# Patient Record
Sex: MALE | Race: OTHER | Employment: UNEMPLOYED | URBAN - METROPOLITAN AREA
[De-identification: names, ages, dates, MRNs, and addresses within clinical notes are randomized per-mention and may not be internally consistent; named-entity substitution may affect disease eponyms.]

---

## 2017-11-13 ENCOUNTER — OFFICE VISIT (OUTPATIENT)
Dept: PULMONOLOGY | Age: 52
End: 2017-11-13

## 2017-11-13 VITALS
SYSTOLIC BLOOD PRESSURE: 128 MMHG | TEMPERATURE: 98.4 F | HEIGHT: 71 IN | DIASTOLIC BLOOD PRESSURE: 74 MMHG | HEART RATE: 69 BPM | BODY MASS INDEX: 40.6 KG/M2 | OXYGEN SATURATION: 95 % | WEIGHT: 290 LBS

## 2017-11-13 DIAGNOSIS — Z99.89 OSA ON CPAP: ICD-10-CM

## 2017-11-13 DIAGNOSIS — G47.33 OSA ON CPAP: ICD-10-CM

## 2017-11-13 DIAGNOSIS — E66.01 MORBID OBESITY (HCC): Primary | ICD-10-CM

## 2017-11-13 PROCEDURE — 1036F TOBACCO NON-USER: CPT | Performed by: INTERNAL MEDICINE

## 2017-11-13 PROCEDURE — 3017F COLORECTAL CA SCREEN DOC REV: CPT | Performed by: INTERNAL MEDICINE

## 2017-11-13 PROCEDURE — G8417 CALC BMI ABV UP PARAM F/U: HCPCS | Performed by: INTERNAL MEDICINE

## 2017-11-13 PROCEDURE — 99214 OFFICE O/P EST MOD 30 MIN: CPT | Performed by: INTERNAL MEDICINE

## 2017-11-13 PROCEDURE — G8484 FLU IMMUNIZE NO ADMIN: HCPCS | Performed by: INTERNAL MEDICINE

## 2017-11-13 PROCEDURE — G8427 DOCREV CUR MEDS BY ELIG CLIN: HCPCS | Performed by: INTERNAL MEDICINE

## 2017-11-13 RX ORDER — LISINOPRIL 10 MG/1
10 TABLET ORAL DAILY
COMMUNITY
End: 2022-06-16

## 2017-11-13 RX ORDER — IBUPROFEN 200 MG
200 TABLET ORAL EVERY 6 HOURS PRN
COMMUNITY
End: 2022-05-27

## 2017-11-13 RX ORDER — RANITIDINE 150 MG/1
150 TABLET ORAL 2 TIMES DAILY
COMMUNITY
End: 2022-05-27

## 2017-11-13 NOTE — PROGRESS NOTES
Subjective:     Dl Grimm is a 45 y.o. male who complains today of:     Chief Complaint   Patient presents with    Sleep Apnea     follow up       HPI  Patient is using CPAP with 11   centimeters of H2O with heated humidity. Patient is using CPAP for about   5-7  hours every night. Patient is using CPAP with  nasal mask  Patient said  sleep is restful with the CPAP use. No snoring with CPAP use  No early morning headache. No complaint of daytime sleepiness or tiredness with CPAP use  Patient denies taking naps. No sleepiness with driving  Patient denies difficulty falling asleep or staying asleep. Allergies:  Quinolones  Past Medical History:   Diagnosis Date    Diabetes ([facility] [address]     GERD (gastroesophageal reflux disease)     High cholesterol     Hypertension     IBS (irritable bowel syndrome)      History reviewed. No pertinent surgical history. Family History   Problem Relation Age of Onset    Asthma Mother     Heart Disease Father      Social History     Social History    Marital status: Single     Spouse name: N/A    Number of children: N/A    Years of education: N/A     Occupational History    Not on file. Social History Main Topics    Smoking status: Never Smoker    Smokeless tobacco: Never Used    Alcohol use No    Drug use: No    Sexual activity: Not on file     Other Topics Concern    Not on file     Social History Narrative    No narrative on file         Review of Systems   Psychiatric/Behavioral: Positive for sleep disturbance. Objective:     Vitals:    11/13/17 0900   BP: 128/74   Site: Right Arm   Position: Sitting   Cuff Size: Large Adult   Pulse: 69   Temp: 98.4 °F (36.9 °C)   TempSrc: Tympanic   SpO2: 95%   Weight: 290 lb (131.5 kg)   Height: 5' 11\" (1.803 m)         Physical Exam   Constitutional: He is oriented to person, place, and time. He appears well-developed and well-nourished. Morbidly obese   HENT:   Head: Normocephalic and atraumatic. Nose: Nose normal.   Mouth/Throat: Oropharynx is clear and moist.   . overhanging soft palate, narrow airway. Eyes: Conjunctivae and EOM are normal. Pupils are equal, round, and reactive to light. Neck: No JVD present. No tracheal deviation present. No thyromegaly present. Cardiovascular: Normal rate and regular rhythm. Exam reveals no gallop and no friction rub. No murmur heard. Pulmonary/Chest: Effort normal and breath sounds normal. No respiratory distress. He has no wheezes. He has no rales. He exhibits no tenderness. Abdominal: He exhibits no distension. Musculoskeletal: Normal range of motion. He exhibits edema. Lymphadenopathy:     He has no cervical adenopathy. Neurological: He is alert and oriented to person, place, and time. No cranial nerve deficit. Skin: Skin is warm and dry. No rash noted. Psychiatric: He has a normal mood and affect. His behavior is normal.       Current Outpatient Prescriptions   Medication Sig Dispense Refill    CPAP Machine MISC 11 cm by Does not apply route      ibuprofen (ADVIL;MOTRIN) 200 MG tablet Take 200 mg by mouth every 6 hours as needed for Pain      lisinopril (PRINIVIL;ZESTRIL) 10 MG tablet Take 10 mg by mouth daily      metFORMIN (GLUCOPHAGE) 500 MG tablet Take 500 mg by mouth 2 times daily (with meals)      ranitidine (ZANTAC 150 MAXIMUM STRENGTH) 150 MG tablet Take 150 mg by mouth 2 times daily       No current facility-administered medications for this visit. Assessment/Plan:     1. ROSA on CPAP  Patient is using CPAP with 11   centimeters of H2O with heated humidity. Patient is using CPAP for about   5-7  hours every night. Patient is using CPAP with  nasal mask . Patient said  sleep is restful with the CPAP use. Counseling: CPAP/BiPAP uses, patient advised to use CPAP at least 5-6 hours every night.     Driving: patient denies extreme caution when driving or operating machinery if there is a feeling of drowsiness, especially while driving it is preferable to stop driving and take a brief nap. Sleep hygiene: He avoid supine sleep, sleep on her sides. Avoid  sleep deprivation. Explained sleep hygiene. Advice to avoid Alcohol and sedative    Time spend over 25 min. Face to face. with greater than 50 % time with counseling regarding CPAP therapy. 2. Morbid obesity ([address]    Patient patient is advised try to lose weight. obesity related risk explained to the patient ,  Current weight:  290 lb (131.5 kg) Lbs. BMI:  Body mass index is 40.45 kg/m². Return in about 6 months (around 5/13/2018) for CPAP f/u.       Sylvia Ashraf MD

## 2021-04-21 ENCOUNTER — APPOINTMENT (OUTPATIENT)
Dept: CT IMAGING | Age: 56
DRG: 134 | End: 2021-04-21
Payer: COMMERCIAL

## 2021-04-21 ENCOUNTER — APPOINTMENT (OUTPATIENT)
Dept: GENERAL RADIOLOGY | Age: 56
DRG: 134 | End: 2021-04-21
Payer: COMMERCIAL

## 2021-04-21 ENCOUNTER — HOSPITAL ENCOUNTER (INPATIENT)
Age: 56
LOS: 1 days | Discharge: HOME | DRG: 134 | End: 2021-04-22
Attending: STUDENT IN AN ORGANIZED HEALTH CARE EDUCATION/TRAINING PROGRAM | Admitting: INTERNAL MEDICINE
Payer: COMMERCIAL

## 2021-04-21 ENCOUNTER — APPOINTMENT (OUTPATIENT)
Dept: ULTRASOUND IMAGING | Age: 56
DRG: 134 | End: 2021-04-21
Payer: COMMERCIAL

## 2021-04-21 ENCOUNTER — APPOINTMENT (OUTPATIENT)
Dept: CARDIAC CATH/INVASIVE PROCEDURES | Age: 56
DRG: 134 | End: 2021-04-21
Payer: COMMERCIAL

## 2021-04-21 DIAGNOSIS — I26.02 ACUTE SADDLE PULMONARY EMBOLISM WITH ACUTE COR PULMONALE (HCC): Primary | ICD-10-CM

## 2021-04-21 DIAGNOSIS — E66.01 MORBID OBESITY WITH BMI OF 40.0-44.9, ADULT (HCC): ICD-10-CM

## 2021-04-21 DIAGNOSIS — R77.8 ELEVATED TROPONIN: ICD-10-CM

## 2021-04-21 LAB
ALBUMIN SERPL-MCNC: 4.3 G/DL (ref 3.5–4.6)
ALP BLD-CCNC: 77 U/L (ref 35–104)
ALT SERPL-CCNC: 35 U/L (ref 0–41)
ANION GAP SERPL CALCULATED.3IONS-SCNC: 16 MEQ/L (ref 9–15)
APTT: 29.6 SEC (ref 24.4–36.8)
AST SERPL-CCNC: 20 U/L (ref 0–40)
BASE EXCESS ARTERIAL: -5 (ref -3–3)
BASE EXCESS VENOUS: -6 (ref -3–3)
BASOPHILS ABSOLUTE: 0.1 K/UL (ref 0–0.2)
BASOPHILS RELATIVE PERCENT: 0.7 %
BILIRUB SERPL-MCNC: 0.7 MG/DL (ref 0.2–0.7)
BUN BLDV-MCNC: 15 MG/DL (ref 6–20)
CALCIUM IONIZED: 1.09 MMOL/L (ref 1.12–1.32)
CALCIUM IONIZED: 1.13 MMOL/L (ref 1.12–1.32)
CALCIUM SERPL-MCNC: 9.3 MG/DL (ref 8.5–9.9)
CHLORIDE BLD-SCNC: 102 MEQ/L (ref 95–107)
CO2: 19 MEQ/L (ref 20–31)
CREAT SERPL-MCNC: 1.01 MG/DL (ref 0.7–1.2)
D DIMER: >20 MG/L FEU (ref 0–0.5)
EKG ATRIAL RATE: 109 BPM
EKG P AXIS: 49 DEGREES
EKG P-R INTERVAL: 136 MS
EKG Q-T INTERVAL: 360 MS
EKG QRS DURATION: 88 MS
EKG QTC CALCULATION (BAZETT): 484 MS
EKG R AXIS: -26 DEGREES
EKG T AXIS: 82 DEGREES
EKG VENTRICULAR RATE: 109 BPM
EOSINOPHILS ABSOLUTE: 0.1 K/UL (ref 0–0.7)
EOSINOPHILS RELATIVE PERCENT: 1.2 %
GFR AFRICAN AMERICAN: >60
GFR NON-AFRICAN AMERICAN: 57
GFR NON-AFRICAN AMERICAN: >60
GFR NON-AFRICAN AMERICAN: >60
GLOBULIN: 3.5 G/DL (ref 2.3–3.5)
GLUCOSE BLD-MCNC: 164 MG/DL (ref 60–115)
GLUCOSE BLD-MCNC: 212 MG/DL (ref 70–99)
GLUCOSE BLD-MCNC: 218 MG/DL (ref 60–115)
HCO3 ARTERIAL: 20.5 MMOL/L (ref 21–29)
HCO3 VENOUS: 21 MMOL/L (ref 23–29)
HCT VFR BLD CALC: 50.9 % (ref 42–52)
HEMOGLOBIN: 15.6 GM/DL (ref 13.5–17.5)
HEMOGLOBIN: 16.8 G/DL (ref 14–18)
HEMOGLOBIN: 18.5 GM/DL (ref 13.5–17.5)
INR BLD: 1.2
LACTATE: 1.82 MMOL/L (ref 0.4–2)
LACTATE: 4.31 MMOL/L (ref 0.4–2)
LACTIC ACID: 2 MMOL/L (ref 0.5–2.2)
LACTIC ACID: 4 MMOL/L (ref 0.5–2.2)
LV EF: 55 %
LVEF MODALITY: NORMAL
LYMPHOCYTES ABSOLUTE: 4.2 K/UL (ref 1–4.8)
LYMPHOCYTES RELATIVE PERCENT: 40.5 %
MCH RBC QN AUTO: 30.3 PG (ref 27–31.3)
MCHC RBC AUTO-ENTMCNC: 32.9 % (ref 33–37)
MCV RBC AUTO: 92 FL (ref 80–100)
MONOCYTES ABSOLUTE: 1 K/UL (ref 0.2–0.8)
MONOCYTES RELATIVE PERCENT: 10.1 %
NEUTROPHILS ABSOLUTE: 4.9 K/UL (ref 1.4–6.5)
NEUTROPHILS RELATIVE PERCENT: 47.5 %
O2 SAT, ARTERIAL: 47 % (ref 93–100)
O2 SAT, VEN: 44 %
PCO2 ARTERIAL: 37 MM HG (ref 35–45)
PCO2, VEN: 43.6 MM HG (ref 40–50)
PDW BLD-RTO: 14.4 % (ref 11.5–14.5)
PERFORMED ON: ABNORMAL
PERFORMED ON: ABNORMAL
PH ARTERIAL: 7.36 (ref 7.35–7.45)
PH VENOUS: 7.29 (ref 7.35–7.45)
PLATELET # BLD: 211 K/UL (ref 130–400)
PO2 ARTERIAL: 27 MM HG (ref 75–108)
PO2, VEN: 27 MM HG
POC CHLORIDE: 108 MEQ/L (ref 99–110)
POC CHLORIDE: 110 MEQ/L (ref 99–110)
POC CREATININE: 0.7 MG/DL (ref 0.9–1.3)
POC CREATININE: 1.3 MG/DL (ref 0.9–1.3)
POC FIO2: 4
POC HEMATOCRIT: 46 % (ref 41–53)
POC HEMATOCRIT: 54 % (ref 41–53)
POC POTASSIUM: 4.1 MEQ/L (ref 3.5–5.1)
POC POTASSIUM: 4.1 MEQ/L (ref 3.5–5.1)
POC SAMPLE TYPE: ABNORMAL
POC SAMPLE TYPE: ABNORMAL
POC SODIUM: 140 MEQ/L (ref 136–145)
POC SODIUM: 141 MEQ/L (ref 136–145)
POTASSIUM SERPL-SCNC: 4.2 MEQ/L (ref 3.4–4.9)
PROCALCITONIN: 0.06 NG/ML (ref 0–0.15)
PROTHROMBIN TIME: 14.8 SEC (ref 12.3–14.9)
RBC # BLD: 5.53 M/UL (ref 4.7–6.1)
SARS-COV-2, NAAT: NOT DETECTED
SODIUM BLD-SCNC: 137 MEQ/L (ref 135–144)
TCO2 ARTERIAL: 22 (ref 22–29)
TCO2 CALC VENOUS: 22 MMOL/L
TOTAL CK: 162 U/L (ref 0–190)
TOTAL PROTEIN: 7.8 G/DL (ref 6.3–8)
TROPONIN: 0.02 NG/ML (ref 0–0.01)
TROPONIN: 0.03 NG/ML (ref 0–0.01)
WBC # BLD: 10.3 K/UL (ref 4.8–10.8)

## 2021-04-21 PROCEDURE — 2500000003 HC RX 250 WO HCPCS

## 2021-04-21 PROCEDURE — C1760 CLOSURE DEV, VASC: HCPCS

## 2021-04-21 PROCEDURE — 02CQ3ZZ EXTIRPATION OF MATTER FROM RIGHT PULMONARY ARTERY, PERCUTANEOUS APPROACH: ICD-10-PCS | Performed by: INTERNAL MEDICINE

## 2021-04-21 PROCEDURE — 82565 ASSAY OF CREATININE: CPT

## 2021-04-21 PROCEDURE — 36015 PLACE CATHETER IN ARTERY: CPT | Performed by: INTERNAL MEDICINE

## 2021-04-21 PROCEDURE — 85379 FIBRIN DEGRADATION QUANT: CPT

## 2021-04-21 PROCEDURE — 84145 PROCALCITONIN (PCT): CPT

## 2021-04-21 PROCEDURE — 93306 TTE W/DOPPLER COMPLETE: CPT

## 2021-04-21 PROCEDURE — 2580000003 HC RX 258: Performed by: INTERNAL MEDICINE

## 2021-04-21 PROCEDURE — 02CR3ZZ EXTIRPATION OF MATTER FROM LEFT PULMONARY ARTERY, PERCUTANEOUS APPROACH: ICD-10-PCS | Performed by: INTERNAL MEDICINE

## 2021-04-21 PROCEDURE — 6360000004 HC RX CONTRAST MEDICATION: Performed by: INTERNAL MEDICINE

## 2021-04-21 PROCEDURE — 83605 ASSAY OF LACTIC ACID: CPT

## 2021-04-21 PROCEDURE — C1753 CATH, INTRAVAS ULTRASOUND: HCPCS

## 2021-04-21 PROCEDURE — 87635 SARS-COV-2 COVID-19 AMP PRB: CPT

## 2021-04-21 PROCEDURE — 85014 HEMATOCRIT: CPT

## 2021-04-21 PROCEDURE — B31S1ZZ FLUOROSCOPY OF RIGHT PULMONARY ARTERY USING LOW OSMOLAR CONTRAST: ICD-10-PCS | Performed by: INTERNAL MEDICINE

## 2021-04-21 PROCEDURE — 71045 X-RAY EXAM CHEST 1 VIEW: CPT

## 2021-04-21 PROCEDURE — 93970 EXTREMITY STUDY: CPT

## 2021-04-21 PROCEDURE — 99285 EMERGENCY DEPT VISIT HI MDM: CPT

## 2021-04-21 PROCEDURE — 85730 THROMBOPLASTIN TIME PARTIAL: CPT

## 2021-04-21 PROCEDURE — 80053 COMPREHEN METABOLIC PANEL: CPT

## 2021-04-21 PROCEDURE — 36014 PLACE CATHETER IN ARTERY: CPT | Performed by: INTERNAL MEDICINE

## 2021-04-21 PROCEDURE — 82435 ASSAY OF BLOOD CHLORIDE: CPT

## 2021-04-21 PROCEDURE — B31T1ZZ FLUOROSCOPY OF LEFT PULMONARY ARTERY USING LOW OSMOLAR CONTRAST: ICD-10-PCS | Performed by: INTERNAL MEDICINE

## 2021-04-21 PROCEDURE — 84484 ASSAY OF TROPONIN QUANT: CPT

## 2021-04-21 PROCEDURE — 37184 PRIM ART M-THRMBC 1ST VSL: CPT | Performed by: INTERNAL MEDICINE

## 2021-04-21 PROCEDURE — 6360000004 HC RX CONTRAST MEDICATION: Performed by: STUDENT IN AN ORGANIZED HEALTH CARE EDUCATION/TRAINING PROGRAM

## 2021-04-21 PROCEDURE — 36600 WITHDRAWAL OF ARTERIAL BLOOD: CPT

## 2021-04-21 PROCEDURE — 71275 CT ANGIOGRAPHY CHEST: CPT

## 2021-04-21 PROCEDURE — 93005 ELECTROCARDIOGRAM TRACING: CPT | Performed by: EMERGENCY MEDICINE

## 2021-04-21 PROCEDURE — 82330 ASSAY OF CALCIUM: CPT

## 2021-04-21 PROCEDURE — C1769 GUIDE WIRE: HCPCS

## 2021-04-21 PROCEDURE — C1751 CATH, INF, PER/CENT/MIDLINE: HCPCS

## 2021-04-21 PROCEDURE — 6360000002 HC RX W HCPCS

## 2021-04-21 PROCEDURE — 2060000000 HC ICU INTERMEDIATE R&B

## 2021-04-21 PROCEDURE — 94660 CPAP INITIATION&MGMT: CPT

## 2021-04-21 PROCEDURE — 99291 CRITICAL CARE FIRST HOUR: CPT | Performed by: INTERNAL MEDICINE

## 2021-04-21 PROCEDURE — 6360000002 HC RX W HCPCS: Performed by: INTERNAL MEDICINE

## 2021-04-21 PROCEDURE — B51B1ZZ FLUOROSCOPY OF RIGHT LOWER EXTREMITY VEINS USING LOW OSMOLAR CONTRAST: ICD-10-PCS | Performed by: INTERNAL MEDICINE

## 2021-04-21 PROCEDURE — 85025 COMPLETE CBC W/AUTO DIFF WBC: CPT

## 2021-04-21 PROCEDURE — 6360000002 HC RX W HCPCS: Performed by: STUDENT IN AN ORGANIZED HEALTH CARE EDUCATION/TRAINING PROGRAM

## 2021-04-21 PROCEDURE — 99223 1ST HOSP IP/OBS HIGH 75: CPT | Performed by: INTERNAL MEDICINE

## 2021-04-21 PROCEDURE — 85347 COAGULATION TIME ACTIVATED: CPT

## 2021-04-21 PROCEDURE — 36415 COLL VENOUS BLD VENIPUNCTURE: CPT

## 2021-04-21 PROCEDURE — 2580000003 HC RX 258

## 2021-04-21 PROCEDURE — 85610 PROTHROMBIN TIME: CPT

## 2021-04-21 PROCEDURE — 84132 ASSAY OF SERUM POTASSIUM: CPT

## 2021-04-21 PROCEDURE — 93010 ELECTROCARDIOGRAM REPORT: CPT | Performed by: INTERNAL MEDICINE

## 2021-04-21 PROCEDURE — 82803 BLOOD GASES ANY COMBINATION: CPT

## 2021-04-21 PROCEDURE — 84295 ASSAY OF SERUM SODIUM: CPT

## 2021-04-21 PROCEDURE — 2709999900 HC NON-CHARGEABLE SUPPLY

## 2021-04-21 PROCEDURE — C1894 INTRO/SHEATH, NON-LASER: HCPCS

## 2021-04-21 PROCEDURE — 82550 ASSAY OF CK (CPK): CPT

## 2021-04-21 PROCEDURE — 87040 BLOOD CULTURE FOR BACTERIA: CPT

## 2021-04-21 PROCEDURE — 75743 ARTERY X-RAYS LUNGS: CPT | Performed by: INTERNAL MEDICINE

## 2021-04-21 PROCEDURE — 6370000000 HC RX 637 (ALT 250 FOR IP): Performed by: INTERNAL MEDICINE

## 2021-04-21 RX ORDER — ASPIRIN 81 MG/1
81 TABLET, CHEWABLE ORAL DAILY
Status: DISCONTINUED | OUTPATIENT
Start: 2021-04-22 | End: 2021-04-22 | Stop reason: HOSPADM

## 2021-04-21 RX ORDER — HEPARIN SODIUM 5000 [USP'U]/ML
5000 INJECTION, SOLUTION INTRAVENOUS; SUBCUTANEOUS ONCE
Status: COMPLETED | OUTPATIENT
Start: 2021-04-21 | End: 2021-04-21

## 2021-04-21 RX ORDER — ATORVASTATIN CALCIUM 80 MG/1
80 TABLET, FILM COATED ORAL NIGHTLY
Status: DISCONTINUED | OUTPATIENT
Start: 2021-04-21 | End: 2021-04-22 | Stop reason: HOSPADM

## 2021-04-21 RX ORDER — ACETAMINOPHEN 650 MG/1
650 SUPPOSITORY RECTAL EVERY 6 HOURS PRN
Status: DISCONTINUED | OUTPATIENT
Start: 2021-04-21 | End: 2021-04-22 | Stop reason: HOSPADM

## 2021-04-21 RX ORDER — LANOLIN ALCOHOL/MO/W.PET/CERES
3 CREAM (GRAM) TOPICAL NIGHTLY
Status: DISCONTINUED | OUTPATIENT
Start: 2021-04-21 | End: 2021-04-22 | Stop reason: HOSPADM

## 2021-04-21 RX ORDER — DIPHENHYDRAMINE HCL 25 MG
50 TABLET ORAL ONCE
Status: DISCONTINUED | OUTPATIENT
Start: 2021-04-21 | End: 2021-04-22 | Stop reason: HOSPADM

## 2021-04-21 RX ORDER — ONDANSETRON 2 MG/ML
4 INJECTION INTRAMUSCULAR; INTRAVENOUS EVERY 6 HOURS PRN
Status: DISCONTINUED | OUTPATIENT
Start: 2021-04-21 | End: 2021-04-22 | Stop reason: HOSPADM

## 2021-04-21 RX ORDER — NITROGLYCERIN 0.4 MG/1
0.4 TABLET SUBLINGUAL EVERY 5 MIN PRN
Status: DISCONTINUED | OUTPATIENT
Start: 2021-04-21 | End: 2021-04-22 | Stop reason: HOSPADM

## 2021-04-21 RX ORDER — SODIUM CHLORIDE 9 MG/ML
INJECTION, SOLUTION INTRAVENOUS CONTINUOUS
Status: DISPENSED | OUTPATIENT
Start: 2021-04-21 | End: 2021-04-22

## 2021-04-21 RX ORDER — HEPARIN SODIUM 10000 [USP'U]/100ML
5-30 INJECTION, SOLUTION INTRAVENOUS CONTINUOUS
Status: DISCONTINUED | OUTPATIENT
Start: 2021-04-21 | End: 2021-04-21

## 2021-04-21 RX ORDER — ACETAMINOPHEN 325 MG/1
650 TABLET ORAL EVERY 4 HOURS PRN
Status: DISCONTINUED | OUTPATIENT
Start: 2021-04-21 | End: 2021-04-22 | Stop reason: HOSPADM

## 2021-04-21 RX ORDER — LABETALOL HYDROCHLORIDE 5 MG/ML
10 INJECTION, SOLUTION INTRAVENOUS EVERY 30 MIN PRN
Status: DISCONTINUED | OUTPATIENT
Start: 2021-04-21 | End: 2021-04-22 | Stop reason: HOSPADM

## 2021-04-21 RX ORDER — FAMOTIDINE 20 MG/1
20 TABLET, FILM COATED ORAL 2 TIMES DAILY PRN
COMMUNITY
End: 2022-05-27

## 2021-04-21 RX ORDER — HYDRALAZINE HYDROCHLORIDE 20 MG/ML
10 INJECTION INTRAMUSCULAR; INTRAVENOUS EVERY 10 MIN PRN
Status: DISCONTINUED | OUTPATIENT
Start: 2021-04-21 | End: 2021-04-22 | Stop reason: HOSPADM

## 2021-04-21 RX ORDER — SODIUM CHLORIDE 9 MG/ML
INJECTION, SOLUTION INTRAVENOUS CONTINUOUS
Status: DISCONTINUED | OUTPATIENT
Start: 2021-04-21 | End: 2021-04-22 | Stop reason: HOSPADM

## 2021-04-21 RX ORDER — SODIUM CHLORIDE 0.9 % (FLUSH) 0.9 %
5-40 SYRINGE (ML) INJECTION EVERY 12 HOURS SCHEDULED
Status: DISCONTINUED | OUTPATIENT
Start: 2021-04-21 | End: 2021-04-22 | Stop reason: HOSPADM

## 2021-04-21 RX ORDER — IODIXANOL 320 MG/ML
100 INJECTION, SOLUTION INTRAVASCULAR ONCE
Status: COMPLETED | OUTPATIENT
Start: 2021-04-21 | End: 2021-04-21

## 2021-04-21 RX ORDER — PROMETHAZINE HYDROCHLORIDE 12.5 MG/1
12.5 TABLET ORAL EVERY 6 HOURS PRN
Status: DISCONTINUED | OUTPATIENT
Start: 2021-04-21 | End: 2021-04-22 | Stop reason: HOSPADM

## 2021-04-21 RX ORDER — SODIUM CHLORIDE 0.9 % (FLUSH) 0.9 %
5-40 SYRINGE (ML) INJECTION PRN
Status: DISCONTINUED | OUTPATIENT
Start: 2021-04-21 | End: 2021-04-22 | Stop reason: HOSPADM

## 2021-04-21 RX ORDER — POLYETHYLENE GLYCOL 3350 17 G/17G
17 POWDER, FOR SOLUTION ORAL DAILY PRN
Status: DISCONTINUED | OUTPATIENT
Start: 2021-04-21 | End: 2021-04-22 | Stop reason: HOSPADM

## 2021-04-21 RX ORDER — HEPARIN SODIUM 5000 [USP'U]/ML
5000 INJECTION, SOLUTION INTRAVENOUS; SUBCUTANEOUS ONCE
Status: DISCONTINUED | OUTPATIENT
Start: 2021-04-21 | End: 2021-04-21

## 2021-04-21 RX ORDER — SODIUM CHLORIDE 9 MG/ML
25 INJECTION, SOLUTION INTRAVENOUS PRN
Status: DISCONTINUED | OUTPATIENT
Start: 2021-04-21 | End: 2021-04-22 | Stop reason: HOSPADM

## 2021-04-21 RX ORDER — ACETAMINOPHEN 325 MG/1
650 TABLET ORAL EVERY 6 HOURS PRN
Status: DISCONTINUED | OUTPATIENT
Start: 2021-04-21 | End: 2021-04-22 | Stop reason: HOSPADM

## 2021-04-21 RX ADMIN — HEPARIN SODIUM 5000 UNITS: 5000 INJECTION INTRAVENOUS; SUBCUTANEOUS at 11:05

## 2021-04-21 RX ADMIN — HEPARIN SODIUM AND DEXTROSE 15.8 UNITS/KG/HR: 10000; 5 INJECTION INTRAVENOUS at 11:07

## 2021-04-21 RX ADMIN — ATORVASTATIN CALCIUM 80 MG: 80 TABLET, FILM COATED ORAL at 21:13

## 2021-04-21 RX ADMIN — ENOXAPARIN SODIUM 120 MG: 120 INJECTION SUBCUTANEOUS at 18:18

## 2021-04-21 RX ADMIN — Medication 10 ML: at 21:14

## 2021-04-21 RX ADMIN — SODIUM CHLORIDE: 9 INJECTION, SOLUTION INTRAVENOUS at 18:18

## 2021-04-21 RX ADMIN — IODIXANOL 100 ML: 320 INJECTION, SOLUTION INTRAVASCULAR at 14:50

## 2021-04-21 RX ADMIN — IOPAMIDOL 100 ML: 612 INJECTION, SOLUTION INTRAVENOUS at 10:23

## 2021-04-21 ASSESSMENT — ENCOUNTER SYMPTOMS
VOMITING: 0
DIARRHEA: 0
TROUBLE SWALLOWING: 0
CHEST TIGHTNESS: 0
ALLERGIC/IMMUNOLOGIC NEGATIVE: 1
NAUSEA: 0
SHORTNESS OF BREATH: 1
WHEEZING: 0
GASTROINTESTINAL NEGATIVE: 1
BACK PAIN: 0
COUGH: 0
EYES NEGATIVE: 1
SORE THROAT: 0
ABDOMINAL PAIN: 0

## 2021-04-21 ASSESSMENT — PAIN SCALES - GENERAL: PAINLEVEL_OUTOF10: 0

## 2021-04-21 NOTE — FLOWSHEET NOTE
Following return from procedure, pt able to breathe on room air without issue - puncture site WDL - nephew in to visit with him -  in to fill out POA paperwork - handoff of care to [employer].  Electronically signed by Nitin Eugene RN on 4/21/2021 at 7:35 PM

## 2021-04-21 NOTE — PROGRESS NOTES
Spiritual Care Services     Summary of Visit:  Nurse requested  to visit patient to assist with HPOA.  assisted patient in completing the forms. Patient named his nephew his agent and  placed a copy in the patient's chart. Spiritual Assessment/Intervention/Outcomes:    Encounter Summary  Services provided to[de-identified] Patient and family together  Referral/Consult From[de-identified] Nurse  Support System: Family members  Continue Visiting: No  Complexity of Encounter: Moderate  Length of Encounter: 45 minutes  Spiritual Assessment Completed: Yes  Routine  Type: Initial              Advance Directives (For Healthcare)  Healthcare Directive: Yes, patient has an advance directive for healthcare treatment  Type of Healthcare Directive: Durable power of  for health care  Copy in Chart: Yes, copy in chart  Chart Copy Status [de-identified] Active, Current  Date Reviewed and Current[de-identified] 04/21/21  Information on Healthcare Directives Requested: Yes  Patient Requests Assistance: Yes, referral made to   Advance Directives: Healthcare power of attornery completed  Healthcare Agent Appointed: Healthcare power of [facility] [address] Agent's Name: Burak Barker Agent's Phone Number: [phone], [phone]([facility] [address] is primary)  If you are unable to speak for yourself, does your Healthcare Agent or Legal Spokesperson know your healthcare wishes?: Yes           Values / Beliefs  Do you have any ethnic, cultural, sacramental, or spiritual Worship needs you would like us to be aware of while you are in the hospital?: No    Care Plan:        [id number] [address]   Electronically signed by Jarvis Hedrick on 4/21/21 at 5:12 PM EDT     To reach a  for emotional and spiritual support, place an [facility] consult request.   If a  is needed immediately, dial 0 and ask to page the on-call .

## 2021-04-21 NOTE — BRIEF OP NOTE
Section of Cardiology  Adult Brief Procedure Note        Procedure(s):  B/l pulmonary angio, b/l main PA PE thrombectomy    Pre-operative Diagnosis:      Saddle PE    H&P Status: Completed and reviewed.      Post-operative Diagnosis:      Severe PHTN initially significantly improved post procedure  SADDLE PE    Findings:  See full report    Complications:  none    Primary Proceduralist:   Dr.Wes Kohler DO    Lovenox   Monitor in ICU      Full procedure note to follow

## 2021-04-21 NOTE — ED NOTES
X ray in room      Jonathan Marshall, [facility]  04/21/21 6461

## 2021-04-21 NOTE — H&P
Chief Complaint   Patient presents with    Shortness of Breath     x 5 days        Patient is a 60 y.o. male who presents with a chief complaint of shortness of breath. Patient is followed on a regular basis by Dr. Jessica Nguyen MD.  Patient presents with 5 days onset of worsening shortness of breath. Denies any history of myocardial infarction, congestive heart failure or arrhythmia. Work-up in the emergency department revealed a saddle pulmonary embolism/massive central pulmonary believes him. Patient's blood pressure on presentation was 155/108 with a heart rate of 108 at rest respirations to 37, 95% saturation on 4 L nasal cannula. He denies any recent travel. Denies any history of cancer. Denies any recent lower extremity trauma. Denies any tobacco abuse. Denies any previous history of thromboembolic disease. Denies family history of thromboembolic disease. He admits to history of diabetes, hypertension and hyperlipidemia. Noted to have mildly elevated cardiac enzyme. Past Medical History:   Diagnosis Date    Diabetes ([address]     GERD (gastroesophageal reflux disease)     High cholesterol     Hypertension     IBS (irritable bowel syndrome)       Patient Active Problem List   Diagnosis    ROSA on CPAP    Acute saddle pulmonary embolism with acute cor pulmonale (HCC)       No past surgical history on file.     Social History     Socioeconomic History    Marital status: Single     Spouse name: Not on file    Number of children: Not on file    Years of education: Not on file    Highest education level: Not on file   Occupational History    Not on file   Social Needs    Financial resource strain: Not on file    Food insecurity     Worry: Not on file     Inability: Not on file    Transportation needs     Medical: Not on file     Non-medical: Not on file   Tobacco Use    Smoking status: Never Smoker    Smokeless tobacco: Never Used   Substance and Sexual Activity    Alcohol use: No    Drug use: No    Sexual activity: Not on file   Lifestyle    Physical activity     Days per week: Not on file     Minutes per session: Not on file    Stress: Not on file   Relationships    Social connections     Talks on phone: Not on file     Gets together: Not on file     Attends Protestant service: Not on file     Active member of club or organization: Not on file     Attends meetings of clubs or organizations: Not on file     Relationship status: Not on file    Intimate partner violence     Fear of current or ex partner: Not on file     Emotionally abused: Not on file     Physically abused: Not on file     Forced sexual activity: Not on file   Other Topics Concern    Not on file   Social History Narrative    Not on file       Family History   Problem Relation Age of Onset    Asthma Mother     Heart Disease Father        Current Facility-Administered Medications   Medication Dose Route Frequency Provider Last Rate Last Admin    heparin 25,000 units in dextrose 5% 250 mL (premix) infusion  5-30 Units/kg/hr Intravenous Continuous Elvin Benton,         heparin (porcine) injection 5,000 Units  5,000 Units Intravenous Once Elvin Benton, DO         Current Outpatient Medications   Medication Sig Dispense Refill    CPAP Machine MISC 11 cm by Does not apply route      ibuprofen (ADVIL;MOTRIN) 200 MG tablet Take 200 mg by mouth every 6 hours as needed for Pain      lisinopril (PRINIVIL;ZESTRIL) 10 MG tablet Take 10 mg by mouth daily      metFORMIN (GLUCOPHAGE) 500 MG tablet Take 500 mg by mouth 2 times daily (with meals)      ranitidine (ZANTAC 150 MAXIMUM STRENGTH) 150 MG tablet Take 150 mg by mouth 2 times daily         ALLERGIES: Quinolones    Review of Systems   Constitutional: Positive for fatigue. Negative for chills and fever. HENT: Negative. Eyes: Negative. Respiratory: Positive for shortness of breath. Negative for wheezing.     Cardiovascular: Negative for chest pain, palpitations and leg swelling. Gastrointestinal: Negative. Negative for abdominal pain, nausea and vomiting. Endocrine: Negative. Genitourinary: Negative. Skin: Negative. Negative for rash. Allergic/Immunologic: Negative. Neurological: Positive for weakness. Negative for dizziness and headaches. VITALS:  Blood pressure (!) 156/108, pulse 107, temperature 98.3 °F (36.8 °C), resp. rate 25, weight 293 lb (132.9 kg), SpO2 95 %. Body mass index is 40.87 kg/m². Physical Exam   Constitutional: He is oriented to person, place, and time. He appears well-developed and well-nourished. HENT:   Head: Normocephalic and atraumatic. Eyes: Pupils are equal, round, and reactive to light. Neck: Normal range of motion. Neck supple. No JVD present. No tracheal deviation present. No thyromegaly present. Cardiovascular: Normal rate, regular rhythm, normal heart sounds and intact distal pulses. PMI is not displaced. Exam reveals no gallop, no S3, no distant heart sounds and no friction rub. No murmur heard. Pulmonary/Chest: He is in respiratory distress. He has no wheezes. He has no rales. He exhibits no tenderness. Abdominal: Soft. Bowel sounds are normal. He exhibits no distension and no mass. There is no abdominal tenderness. There is no rebound and no guarding. Musculoskeletal:         General: No edema. Neurological: He is alert and oriented to person, place, and time. No cranial nerve deficit. Skin: Skin is warm and dry. No rash noted. He is not diaphoretic. No erythema. No pallor. Psychiatric: He has a normal mood and affect.  His behavior is normal. Judgment and thought content normal.       LABS:  Recent Results (from the past 24 hour(s))   EKG 12 Lead    Collection Time: 04/21/21  8:45 AM   Result Value Ref Range    Ventricular Rate 109 BPM    Atrial Rate 109 BPM    P-R Interval 136 ms    QRS Duration 88 ms    Q-T Interval 360 ms    QTc Calculation (Bazett) 484 ms    P Axis 49 degrees    R Axis -26 degrees    T Axis 82 degrees   APTT    Collection Time: 04/21/21  8:56 AM   Result Value Ref Range    aPTT 29.6 24.4 - 36.8 sec   CK    Collection Time: 04/21/21  8:56 AM   Result Value Ref Range    Total  0 - 190 U/L   Troponin    Collection Time: 04/21/21  8:56 AM   Result Value Ref Range    Troponin 0.031 (HH) 0.000 - 0.010 ng/mL   Protime-INR    Collection Time: 04/21/21  8:56 AM   Result Value Ref Range    Protime 14.8 12.3 - 14.9 sec    INR 1.2    Comprehensive Metabolic Panel    Collection Time: 04/21/21  8:56 AM   Result Value Ref Range    Sodium 137 135 - 144 mEq/L    Potassium 4.2 3.4 - 4.9 mEq/L    Chloride 102 95 - 107 mEq/L    CO2 19 (L) 20 - 31 mEq/L    Anion Gap 16 (H) 9 - 15 mEq/L    Glucose 212 (H) 70 - 99 mg/dL    BUN 15 6 - 20 mg/dL    CREATININE 1.01 0.70 - 1.20 mg/dL    GFR Non-African American >60.0 >60    GFR  >60.0 >60    Calcium 9.3 8.5 - 9.9 mg/dL    Total Protein 7.8 6.3 - 8.0 g/dL    Albumin 4.3 3.5 - 4.6 g/dL    Total Bilirubin 0.7 0.2 - 0.7 mg/dL    Alkaline Phosphatase 77 35 - 104 U/L    ALT 35 0 - 41 U/L    AST 20 0 - 40 U/L    Globulin 3.5 2.3 - 3.5 g/dL   COVID-19, Rapid    Collection Time: 04/21/21  8:56 AM    Specimen: Nasopharyngeal Swab   Result Value Ref Range    SARS-CoV-2, NAAT Not Detected Not Detected   PROCALCITONIN    Collection Time: 04/21/21  8:56 AM   Result Value Ref Range    Procalcitonin 0.06 0.00 - 0.15 ng/mL   D-Dimer, Quantitative    Collection Time: 04/21/21  8:56 AM   Result Value Ref Range    D-Dimer, Quant >20.00 (HH) 0.00 - 0.50 mg/L FEU   CBC Auto Differential    Collection Time: 04/21/21  8:57 AM   Result Value Ref Range    WBC 10.3 4.8 - 10.8 K/uL    RBC 5.53 4.70 - 6.10 M/uL    Hemoglobin 16.8 14.0 - 18.0 g/dL    Hematocrit 50.9 42.0 - 52.0 %    MCV 92.0 80.0 - 100.0 fL    MCH 30.3 27.0 - 31.3 pg    MCHC 32.9 (L) 33.0 - 37.0 %    RDW 14.4 11.5 - 14.5 %    Platelets 198 614 - 106 K/uL    Neutrophils % 47.5 %    Lymphocytes % 40.5 % Monocytes % 10.1 %    Eosinophils % 1.2 %    Basophils % 0.7 %    Neutrophils Absolute 4.9 1.4 - 6.5 K/uL    Lymphocytes Absolute 4.2 1.0 - 4.8 K/uL    Monocytes Absolute 1.0 (H) 0.2 - 0.8 K/uL    Eosinophils Absolute 0.1 0.0 - 0.7 K/uL    Basophils Absolute 0.1 0.0 - 0.2 K/uL   POCT Venous    Collection Time: 04/21/21  9:09 AM   Result Value Ref Range    POC Sodium 140 136 - 145 mEq/L    POC Potassium 4.1 3.5 - 5.1 mEq/L    POC Chloride 108 99 - 110 mEq/L    POC Glucose 218 (H) 60 - 115 mg/dl    POC Creatinine 1.3 0.9 - 1.3 mg/dL    GFR Non- 57 (A) >60    GFR African American >60 >60    Calcium, Ion 1.13 1.12 - 1.32 mmol/L    pH, Marc 7.290 (L) 7.350 - 7.450    pCO2, Marc 43.6 40.0 - 50.0 mm Hg    pO2, Marc 27 Not Established mm Hg    HCO3, Venous 21.0 (L) 23.0 - 29.0 mmol/L    Base Excess, Marc -6 (L) -3 - 3    O2 Sat, Marc 44 Not Established %    TC02 (Calc), Marc 22 Not Established mmol/L    Lactate 4.31 (HH) 0.40 - 2.00 mmol/L    Hemoglobin 18.5 (H) 13.5 - 17.5 gm/dL    POC Hematocrit 54 (H) 41 - 53 %    FIO2 4.000     Sample Type MARC     Performed on SEE BELOW    Lactic Acid, Plasma    Collection Time: 04/21/21  9:21 AM   Result Value Ref Range    Lactic Acid 4.0 (HH) 0.5 - 2.2 mmol/L     Troponin:   Lab Results   Component Value Date    TROPONINI 0.031 04/21/2021       EKG: sinus tachycardia      ASSESSMENT:    Active Hospital Problems    Diagnosis Date Noted    Acute saddle pulmonary embolism with acute cor pulmonale (HCC) [I26.02] 04/21/2021     Priority: Low     Unprovoked acute symptomatic saddle pulmonary embolism/massive central pulmonary believes him    RV strain/RV failure    Respiratory failure secondary to massive pulmonary embolism    Elevated cardiac enzymes secondary pulmonary embolism    History of diabetes    Essential hypertension    Hyperlipidemia    Morbid obesity class III      PLAN:   1. As always, aggressive risk factor modification is strongly recommended.  We should adhere to the [address] guidelines for HTN management and the NCEPATP III guidelines for LDL-C management. 2. Stat 2D echocardiogram  3. Stat bilateral lower extremity venous duplex ultrasound  4. Stat pulmonary consultation  5. Patient would benefit from pulmonary embolism thrombectomy given massive-symptomatic pulmonary embolism with RV strain. Patient is agreeable and understands the risk benefits and alternatives of the procedure. 6. Monitor on telemetry  7. Initiate heparin drip  8. ICU care post procedure  9. Further recommendations to follow. Thank you for allowing me to participate in the care of your patient, please don't hesitate to contact me if you have any further questions.     Electronically signed by Haider Paulino DO on 4/21/2021 at 11:12 AM

## 2021-04-21 NOTE — PATIENT CARE CONFERENCE
Pt to ICU 8 from ER for SOB increasing over 1 week - heparin gtt initiated in ER - Cath lab team up to take patient to have PE Thrombectomy at about 1215 - handoff given at bedside - pt has two IVs for procedure - cath lab team obtaining consent for procedure.  Electronically signed by Bhupinder Verma RN on 4/21/2021 at 12:34 PM

## 2021-04-21 NOTE — ED PROVIDER NOTES
[address] [facility] ED  eMERGENCY dEPARTMENT eNCOUnter      Pt Name: Francois Lozano  MRN: [id number]  [place] 1965  Date of evaluation: 4/21/2021  Provider: DEBBIE Lindsay CNP      HISTORY OF PRESENT ILLNESS    Francois Lozano is a 60 y.o. male who presents to the Emergency Department with fatigue and SOB x 5 days. Patient denies recent illness or fever. He states he has not had an appetite and has not been drinking fluids well. He denies vomiting, nausea or diarrhea. No pain at this time. His SOB is worse with exertion. REVIEW OF SYSTEMS       Review of Systems   Constitutional: Positive for activity change, appetite change and fatigue. Negative for fever. HENT: Negative for congestion, drooling, ear pain, sore throat and trouble swallowing. Respiratory: Positive for shortness of breath. Negative for cough, chest tightness and wheezing. Cardiovascular: Negative for chest pain, palpitations and leg swelling. Gastrointestinal: Negative for abdominal pain, diarrhea, nausea and vomiting. Genitourinary: Negative for dysuria. Musculoskeletal: Negative for arthralgias and back pain. Skin: Negative for rash. All other systems reviewed and are negative. PAST MEDICAL HISTORY     Past Medical History:   Diagnosis Date    Diabetes ([address]     GERD (gastroesophageal reflux disease)     High cholesterol     Hypertension     IBS (irritable bowel syndrome)          SURGICAL HISTORY     No past surgical history on file.       CURRENT MEDICATIONS       Previous Medications    CPAP MACHINE MISC    11 cm by Does not apply route    IBUPROFEN (ADVIL;MOTRIN) 200 MG TABLET    Take 200 mg by mouth every 6 hours as needed for Pain    LISINOPRIL (PRINIVIL;ZESTRIL) 10 MG TABLET    Take 10 mg by mouth daily    METFORMIN (GLUCOPHAGE) 500 MG TABLET    Take 500 mg by mouth 2 times daily (with meals)    RANITIDINE (ZANTAC 150 MAXIMUM STRENGTH) 150 MG TABLET    Take 150 mg by mouth 2 times daily ALLERGIES     Quinolones    FAMILY HISTORY       Family History   Problem Relation Age of Onset    Asthma Mother     Heart Disease Father           SOCIAL HISTORY       Social History     Socioeconomic History    Marital status: Single     Spouse name: Not on file    Number of children: Not on file    Years of education: Not on file    Highest education level: Not on file   Occupational History    Not on file   Social Needs    Financial resource strain: Not on file    Food insecurity     Worry: Not on file     Inability: Not on file    Transportation needs     Medical: Not on file     Non-medical: Not on file   Tobacco Use    Smoking status: Never Smoker    Smokeless tobacco: Never Used   Substance and Sexual Activity    Alcohol use: No    Drug use: No    Sexual activity: Not on file   Lifestyle    Physical activity     Days per week: Not on file     Minutes per session: Not on file    Stress: Not on file   Relationships    Social connections     Talks on phone: Not on file     Gets together: Not on file     Attends Roman [employer] service: Not on file     Active member of club or organization: Not on file     Attends meetings of clubs or organizations: Not on file     Relationship status: Not on file    Intimate partner violence     Fear of current or ex partner: Not on file     Emotionally abused: Not on file     Physically abused: Not on file     Forced sexual activity: Not on file   Other Topics Concern    Not on file   Social History Narrative    Not on file       SCREENINGS      @IDOW([id number])@      PHYSICAL EXAM    (up to 7 for level 4, 8 or more for level 5)     ED Triage Vitals [04/21/21 0850]   BP Temp Temp src Pulse Resp SpO2 Height Weight   (!) 155/88 98.3 °F (36.8 °C) -- 108 26 95 % -- --       Physical Exam  Vitals signs and nursing note reviewed. Constitutional:       Appearance: He is well-developed. HENT:      Head: Normocephalic and atraumatic.       Right Ear: Hearing and external ear normal.      Left Ear: Hearing and external ear normal.      Nose: Nose normal.      Mouth/Throat:      Lips: Pink. Mouth: Mucous membranes are moist.      Pharynx: Oropharynx is clear. Uvula midline. Eyes:      Conjunctiva/sclera: Conjunctivae normal.      Pupils: Pupils are equal, round, and reactive to light. Neck:      Musculoskeletal: Normal range of motion and neck supple. Cardiovascular:      Rate and Rhythm: Normal rate and regular rhythm. Heart sounds: Normal heart sounds. Comments: , NST,  No ST elevation or depression  Pulmonary:      Effort: Respiratory distress (appears SOB at rest worse with movement) present. No accessory muscle usage. Breath sounds: Normal breath sounds. No decreased air movement. No decreased breath sounds, wheezing or rhonchi. Abdominal:      General: Bowel sounds are normal. There is no distension. Palpations: Abdomen is soft. Tenderness: There is no abdominal tenderness. Musculoskeletal: Normal range of motion. Skin:     General: Skin is warm and dry. Neurological:      Mental Status: He is alert and oriented to person, place, and time. GCS: GCS eye subscore is 4. GCS verbal subscore is 5. GCS motor subscore is 6. Cranial Nerves: Cranial nerves are intact. Sensory: Sensation is intact. Motor: Motor function is intact. Coordination: Coordination is intact. Deep Tendon Reflexes: Reflexes are normal and symmetric. Psychiatric:         Judgment: Judgment normal.           All other labs were within normal range or not returned as of this dictation. EMERGENCY DEPARTMENT COURSE and DIFFERENTIALDIAGNOSIS/MDM:   Vitals:    Vitals:    04/21/21 0900 04/21/21 0916 04/21/21 1030 04/21/21 1032   BP: (!) 142/90  (!) 156/108 (!) 156/108   Pulse: 107  110 107   Resp: (!) 34 (!) 32 (!) 37 25   Temp:       SpO2: 95% 93% 95% 95%     EKG: Sinus tachycardia 109 bpm.  Q wave in III, QS wave in V1.   Q wave in aVR. T wave inversion in aVL. Biphasic T wave in V2. The QT intervals 360 ms. No PVCs. (added 4/21/2021 2230.)       60 yr old male with acute saddle PE, elevated Troponin and Morbid obesity. Dr. Aron Goltz was in to see the patient in the ER,  Dr. Ricarda Couch at bedside as well. Patient will be admitted to the ICU. Critical care time 84 minutes. Plan for thrombectomy. IV heparin ordered. Transitional orders placed by Dr. Ricarda Couch. PROCEDURES:  Unless otherwise noted below, none     Procedures      FINAL IMPRESSION      1. Acute saddle pulmonary embolism with acute cor pulmonale (HCC)    2. Elevated troponin    3.  Morbid obesity with BMI of 40.0-44.9, adult [facility])          DISPOSITION/PLAN   DISPOSITION Decision To Admit 04/21/2021 10:30:09 AM          DEBBIE Syed CNP (electronically signed)  Attending Emergency Physician     Dalton Marion DO  04/21/21 2230

## 2021-04-21 NOTE — CONSULTS
Pulmonary and Critical Care Medicine  Consult Note  Encounter Date: 2021 12:25 PM    Mr. Royal Del Castillo is a 60 y.o. male  : 1965  Requesting Provider: Waylan Hatchet, DO    Reason for request: Submassive PE            HISTORY OF PRESENT ILLNESS:    Patient is 60 y.o. presents with worsening shortness of breath, symptoms started 1 week ago, denies chest pain, he has been progressively getting more short of breath, no fever no chills, no coughing, no worsening lower extremity edema, CT chest shows saddle PE, he denies any recent surgery, no traveling, no trauma, no past medical history of venous thromboembolic disease, no family history of venous thromboembolic disease. Patient currently short of breath, he was tachypneic in ED, heart rate 108, he is on 4 L O2 saturation 95%, blood pressure remained stable. Past Medical History:        Diagnosis Date    Diabetes ([address]     GERD (gastroesophageal reflux disease)     High cholesterol     Hypertension     IBS (irritable bowel syndrome)        Past Surgical History:    No past surgical history on file. Social History:     reports that he has never smoked. He has never used smokeless tobacco. He reports that he does not drink alcohol or use drugs.     Family History:       Problem Relation Age of Onset    Asthma Mother     Heart Disease Father        Allergies:  Quinolones        MEDICATIONS during current hospitalization:    Continuous Infusions:   sodium chloride      heparin (PORCINE) Infusion         Scheduled Meds:   sodium chloride flush  5-40 mL Intravenous 2 times per day    [START ON 2021] aspirin  81 mg Oral Daily    atorvastatin  80 mg Oral Nightly       PRN Meds:sodium chloride flush, sodium chloride, promethazine **OR** ondansetron, acetaminophen **OR** acetaminophen, polyethylene glycol, nitroGLYCERIN        REVIEW OF SYSTEMS:  ROS: 10 organs review of system is done including general, psychological, ENT, hematological, endocrine, respiratory, cardiovascular, gastrointestinal, musculoskeletal, neurological,  allergy and Immunology is done and is otherwise negative. PHYSICAL EXAM:    Vitals:  BP (!) 143/86   Pulse 103   Temp 98.3 °F (36.8 °C)   Resp (!) 32   Wt 279 lb 1.6 oz (126.6 kg)   SpO2 92%   BMI 38.93 kg/m²     General: alert, cooperative, mild distress  Head: normocephalic, atraumatic  Eyes:No gross abnormalities. ENT:  MMM no lesions  Neck:  supple and no masses  Chest : clear to auscultation bilaterally- no wheezes, rales or rhonchi, normal air movement, no respiratory distress  Heart[de-identified] Heart sounds are normal.  Regular rate and rhythm without murmur, gallop or rub. ABD:  symmetric, soft, non-tender, no guarding or rebound  Musculoskeletal : no cyanosis, no clubbing and no edema  Neuro:  Grossly normal  Skin: No rashes or nodules noted. Lymph node:  no cervical nodes  Urology: No Jensen   Psychiatric: appropriate        Data Review  Recent Labs     04/21/21  0857 04/21/21  0909   WBC 10.3  --    HGB 16.8 18.5*   HCT 50.9  --      --       Recent Labs     04/21/21  0856 04/21/21  0909     --    K 4.2  --      --    CO2 19*  --    BUN 15  --    CREATININE 1.01 1.3   GLUCOSE 212*  --        MV Settings: ABGs: No results for input(s): PHART, WYW3RIU, PO2ART, XYU7JEG, BEART, Q7QJAMNC, VWR9KYD in the last 72 hours. O2 Device: Nasal cannula  O2 Flow Rate (L/min): 3 L/min  Lab Results   Component Value Date    LACTA 4.0 04/21/2021       Radiology  I personally reviewed imaging studies and CT chest reviewed by me, bilateral submassive PE, saddle, and extensive both sides. Right ventricle is dilated D-shaped. Assessment, plan:    This is a critically ill patient at risk of deterioration / death , needing close ICU monitoring and intervention due to below noted problems       · Submassive PE, at risk of deterioration and hemodynamic instability patient has D-shaped right ventricle, troponin is increased, and I do not have BMP yet. Echo is pending. · Increased troponin secondary to submassive PE  · Obstructive sleep apnea, on CPAP while asleep  · Obesity    Recommendations  · Continue heparin drip  · Agree with mechanical thrombectomy due to patient large PE, and right ventricular strain that will risk hemodynamic instability and future chronic respiratory issues. · CPAP while asleep  · O2 to keep sat 90 to 92%  · Monitor closely in ICU. Due to the immediate potential for life-threatening deterioration due to submassive PE, I spent 35 minutes providing critical care. This time is excluding time spent performing procedures.       Thank you for consultation    Electronically signed by Agustina Lala MD, [facility],  on 4/21/2021 at 12:25 PM

## 2021-04-22 VITALS
BODY MASS INDEX: 39.07 KG/M2 | HEIGHT: 71 IN | DIASTOLIC BLOOD PRESSURE: 96 MMHG | SYSTOLIC BLOOD PRESSURE: 154 MMHG | TEMPERATURE: 98.1 F | HEART RATE: 90 BPM | OXYGEN SATURATION: 97 % | RESPIRATION RATE: 17 BRPM | WEIGHT: 279.1 LBS

## 2021-04-22 LAB
ANION GAP SERPL CALCULATED.3IONS-SCNC: 13 MEQ/L (ref 9–15)
BUN BLDV-MCNC: 12 MG/DL (ref 6–20)
CALCIUM SERPL-MCNC: 8.6 MG/DL (ref 8.5–9.9)
CHLORIDE BLD-SCNC: 108 MEQ/L (ref 95–107)
CHOLESTEROL, TOTAL: 169 MG/DL (ref 0–199)
CO2: 19 MEQ/L (ref 20–31)
CREAT SERPL-MCNC: 0.82 MG/DL (ref 0.7–1.2)
GFR AFRICAN AMERICAN: >60
GFR NON-AFRICAN AMERICAN: >60
GLUCOSE BLD-MCNC: 137 MG/DL (ref 70–99)
HCT VFR BLD CALC: 41.9 % (ref 42–52)
HDLC SERPL-MCNC: 28 MG/DL (ref 40–59)
HEMOGLOBIN: 13.6 G/DL (ref 14–18)
HOMOCYSTEINE: 6.7 UMOL/L (ref 0–15)
LDL CHOLESTEROL CALCULATED: 106 MG/DL (ref 0–129)
MAGNESIUM: 2 MG/DL (ref 1.7–2.4)
MCH RBC QN AUTO: 30 PG (ref 27–31.3)
MCHC RBC AUTO-ENTMCNC: 32.5 % (ref 33–37)
MCV RBC AUTO: 92.5 FL (ref 80–100)
PDW BLD-RTO: 13.9 % (ref 11.5–14.5)
PERFORMED ON: ABNORMAL
PERFORMED ON: ABNORMAL
PERFORMED ON: NORMAL
PERFORMED ON: NORMAL
PLATELET # BLD: 185 K/UL (ref 130–400)
POC ACTIVATED CLOTTING TIME KAOLIN: 147 SEC (ref 82–152)
POC ACTIVATED CLOTTING TIME KAOLIN: 147 SEC (ref 82–152)
POC ACTIVATED CLOTTING TIME KAOLIN: 180 SEC (ref 82–152)
POC ACTIVATED CLOTTING TIME KAOLIN: 257 SEC (ref 82–152)
POC SAMPLE TYPE: ABNORMAL
POC SAMPLE TYPE: ABNORMAL
POC SAMPLE TYPE: NORMAL
POC SAMPLE TYPE: NORMAL
POTASSIUM SERPL-SCNC: 4 MEQ/L (ref 3.4–4.9)
RBC # BLD: 4.54 M/UL (ref 4.7–6.1)
SODIUM BLD-SCNC: 140 MEQ/L (ref 135–144)
TRIGL SERPL-MCNC: 173 MG/DL (ref 0–150)
WBC # BLD: 11.2 K/UL (ref 4.8–10.8)

## 2021-04-22 PROCEDURE — 80061 LIPID PANEL: CPT

## 2021-04-22 PROCEDURE — 2580000003 HC RX 258: Performed by: INTERNAL MEDICINE

## 2021-04-22 PROCEDURE — 36415 COLL VENOUS BLD VENIPUNCTURE: CPT

## 2021-04-22 PROCEDURE — 83090 ASSAY OF HOMOCYSTEINE: CPT

## 2021-04-22 PROCEDURE — 80048 BASIC METABOLIC PNL TOTAL CA: CPT

## 2021-04-22 PROCEDURE — 85240 CLOT FACTOR VIII AHG 1 STAGE: CPT

## 2021-04-22 PROCEDURE — 2700000000 HC OXYGEN THERAPY PER DAY

## 2021-04-22 PROCEDURE — 85027 COMPLETE CBC AUTOMATED: CPT

## 2021-04-22 PROCEDURE — 85303 CLOT INHIBIT PROT C ACTIVITY: CPT

## 2021-04-22 PROCEDURE — 81240 F2 GENE: CPT

## 2021-04-22 PROCEDURE — 81241 F5 GENE: CPT

## 2021-04-22 PROCEDURE — 86146 BETA-2 GLYCOPROTEIN ANTIBODY: CPT

## 2021-04-22 PROCEDURE — 99233 SBSQ HOSP IP/OBS HIGH 50: CPT | Performed by: INTERNAL MEDICINE

## 2021-04-22 PROCEDURE — 99239 HOSP IP/OBS DSCHRG MGMT >30: CPT | Performed by: INTERNAL MEDICINE

## 2021-04-22 PROCEDURE — 86147 CARDIOLIPIN ANTIBODY EA IG: CPT

## 2021-04-22 PROCEDURE — 83735 ASSAY OF MAGNESIUM: CPT

## 2021-04-22 PROCEDURE — 6360000002 HC RX W HCPCS: Performed by: INTERNAL MEDICINE

## 2021-04-22 PROCEDURE — 85306 CLOT INHIBIT PROT S FREE: CPT

## 2021-04-22 PROCEDURE — 6370000000 HC RX 637 (ALT 250 FOR IP): Performed by: INTERNAL MEDICINE

## 2021-04-22 RX ADMIN — ENOXAPARIN SODIUM 120 MG: 120 INJECTION SUBCUTANEOUS at 18:01

## 2021-04-22 RX ADMIN — Medication 5 ML: at 09:09

## 2021-04-22 RX ADMIN — ENOXAPARIN SODIUM 120 MG: 120 INJECTION SUBCUTANEOUS at 09:05

## 2021-04-22 RX ADMIN — ASPIRIN 81 MG CHEWABLE TABLET 81 MG: 81 TABLET CHEWABLE at 11:30

## 2021-04-22 NOTE — CONSULTS
Transportation needs     Medical: Not on file     Non-medical: Not on file   Tobacco Use    Smoking status: Never Smoker    Smokeless tobacco: Never Used   Substance and Sexual Activity    Alcohol use: No    Drug use: No    Sexual activity: Not on file   Lifestyle    Physical activity     Days per week: Not on file     Minutes per session: Not on file    Stress: Not on file   Relationships    Social connections     Talks on phone: Not on file     Gets together: Not on file     Attends Spiritism service: Not on file     Active member of club or organization: Not on file     Attends meetings of clubs or organizations: Not on file     Relationship status: Not on file    Intimate partner violence     Fear of current or ex partner: Not on file     Emotionally abused: Not on file     Physically abused: Not on file     Forced sexual activity: Not on file   Other Topics Concern    Not on file   Social History Narrative    Not on file         Current Facility-Administered Medications   Medication Dose Route Frequency Provider Last Rate Last Admin    sodium chloride flush 0.9 % injection 5-40 mL  5-40 mL Intravenous 2 times per day Albert Kohler, DO   5 mL at 04/22/21 0909    sodium chloride flush 0.9 % injection 5-40 mL  5-40 mL Intravenous PRN Albert MISHA Holiday, DO        0.9 % sodium chloride infusion  25 mL Intravenous PRN Albert Kohler, DO        promethazine (PHENERGAN) tablet 12.5 mg  12.5 mg Oral Q6H PRN Albert Kohler, DO        Or    ondansetron (ZOFRAN) injection 4 mg  4 mg Intravenous Q6H PRN Albert Barnettiday, DO        acetaminophen (TYLENOL) tablet 650 mg  650 mg Oral Q6H PRN Albert MISHA Barnettiday, DO        Or    acetaminophen (TYLENOL) suppository 650 mg  650 mg Rectal Q6H PRN Albert Barnettiday, DO        polyethylene glycol (GLYCOLAX) packet 17 g  17 g Oral Daily PRN Albert MISHA Barnettiday, DO        aspirin chewable tablet 81 mg  81 mg Oral Daily Albert CABRAL Holiday, DO   81 mg at 04/22/21 1130    atorvastatin (LIPITOR) tablet 80 mg  80 mg Oral Nightly [facility] Holiday, DO   80 mg at 04/21/21 2113    nitroGLYCERIN (NITROSTAT) SL tablet 0.4 mg  0.4 mg Sublingual Q5 Min PRN [facility] Holiday, DO        0.9 % sodium chloride infusion   Intravenous Continuous [facility] Holiday, DO 75 mL/hr at 04/21/21 1818 New Bag at 04/21/21 1818    predniSONE (DELTASONE) tablet 50 mg  50 mg Oral Once [facility] Holiday, DO        diphenhydrAMINE (BENADRYL) tablet 50 mg  50 mg Oral Once [facility] Holiday, DO        hydrocortisone sodium succinate PF (SOLU-CORTEF) injection 200 mg  200 mg Intravenous Once [facility] Holiday, DO        acetaminophen (TYLENOL) tablet 650 mg  650 mg Oral Q4H PRN [facility] Holiday, DO        hydrALAZINE (APRESOLINE) injection 10 mg  10 mg Intravenous Q10 Min PRN [facility] Holiday, DO        labetalol (NORMODYNE;TRANDATE) injection 10 mg  10 mg Intravenous Q30 Min PRN [facility] Holiday, DO        enoxaparin (LOVENOX) injection 120 mg  1 mg/kg Subcutaneous BID [facility] Holiday, DO   120 mg at 04/22/21 1801    melatonin tablet 3 mg  3 mg Oral Nightly Asia Hawkins MD         Outpatient Medications Marked as Taking for the 4/21/21 encounter [facility] Encounter)   Medication Sig Dispense Refill    rivaroxaban 15 & 20 MG Starter Pack Take as directed on package. 1 Package 0    rivaroxaban (XARELTO) 20 MG TABS tablet Take 1 tablet by mouth daily (with breakfast) 30 tablet 1    famotidine (PEPCID) 20 MG tablet Take 20 mg by mouth 2 times daily as needed      CPAP Machine MISC 11 cm by Does not apply route      lisinopril (PRINIVIL;ZESTRIL) 10 MG tablet Take 10 mg by mouth daily      metFORMIN (GLUCOPHAGE) 500 MG tablet Take 500 mg by mouth 2 times daily (with meals)       Allergies   Allergen Reactions    Quinolones          ROS:  Unremarkable except for symptoms mentioned in HPI.           PHYSICAL EXAMINATION:   VITAL SIGNS: BP (!) 154/96   Pulse 90   Temp 98.1 °F (36.7 °C) (Oral)   Resp 17   Ht 5' 11\" (1.803 m)   Wt 279 lb 1.6 oz (126.6 kg) SpO2 97%   BMI 38.93 kg/m²     GENERAL: In no acute distress, obese, well- developed, alert and oriented to person place and time. SKIN: Warm and dry, without jaundice, ecchymoses, or petechiae. HEENT: Normocephalic, sclera anicteric, oral mucosa moist without lesion or exudate in the visible oral cavity or oropharynx, No epistaxis  NECK: supple , no JVD , no thyromegaly  NODES: No palpable adenopathy in the neck Levels I-V, bilateral supraclavicular fossae, axillary chains, or inguinal regions. LUNGS: Good inspiratory effort, no accessory muscle use, clear bilaterally, no focal wheeze, rales or rhonchi. CARDIAC: Normal HS ; Regular rate and rhythm,  ABDOMINAL: Normal bowel sounds present, soft, non-tender, no mass or organomegaly. MUSKL: no tenderness over spine or ribs  EXTREMITIES: no pedal edema or calf tenderness  NEUROLOGIC: Gait not tested. No grossly apparent focal deficits.   PSYCH: cooperative; pt has appropriate behavior and affect    LAB RESULTS:  Recent Results (from the past 24 hour(s))   Troponin    Collection Time: 04/21/21  6:47 PM   Result Value Ref Range    Troponin 0.024 (HH) 0.000 - 0.010 ng/mL   Lactic Acid, Plasma    Collection Time: 04/21/21  6:47 PM   Result Value Ref Range    Lactic Acid 2.0 0.5 - 2.2 mmol/L   Magnesium    Collection Time: 04/22/21  5:59 AM   Result Value Ref Range    Magnesium 2.0 1.7 - 2.4 mg/dL   Lipid panel - fasting    Collection Time: 04/22/21  5:59 AM   Result Value Ref Range    Cholesterol, Total 169 0 - 199 mg/dL    Triglycerides 173 (H) 0 - 150 mg/dL    HDL 28 (L) 40 - 59 mg/dL    LDL Calculated 106 0 - 129 mg/dL   CBC    Collection Time: 04/22/21  5:59 AM   Result Value Ref Range    WBC 11.2 (H) 4.8 - 10.8 K/uL    RBC 4.54 (L) 4.70 - 6.10 M/uL    Hemoglobin 13.6 (L) 14.0 - 18.0 g/dL    Hematocrit 41.9 (L) 42.0 - 52.0 %    MCV 92.5 80.0 - 100.0 fL    MCH 30.0 27.0 - 31.3 pg    MCHC 32.5 (L) 33.0 - 37.0 %    RDW 13.9 11.5 - 14.5 %    Platelets 766 398 - 228 K/uL   Basic Metabolic Panel    Collection Time: 04/22/21  5:59 AM   Result Value Ref Range    Sodium 140 135 - 144 mEq/L    Potassium 4.0 3.4 - 4.9 mEq/L    Chloride 108 (H) 95 - 107 mEq/L    CO2 19 (L) 20 - 31 mEq/L    Anion Gap 13 9 - 15 mEq/L    Glucose 137 (H) 70 - 99 mg/dL    BUN 12 6 - 20 mg/dL    CREATININE 0.82 0.70 - 1.20 mg/dL    GFR Non-African American >60.0 >60    GFR  >60.0 >60    Calcium 8.6 8.5 - 9.9 mg/dL     Recent Labs     04/22/21  0559   GLUCOSE 137*            RADIOLOGY RESULTS:  Cta Chest W Wo Contrast    Result Date: 4/21/2021  The EXAMINATION: CT scan of the chest with contrast (pulmonary embolism protocol) INDICATION: Chest pain and shortness of breath. COMPARISON: None TECHNIQUE: Helical CT was performed through the chest utilizing 100 cc of Isovue-300 intravenous contrast.  Images were obtained with bolus tracking in order to opacify the pulmonary arteries. Both MIP and 3D volume rendered reconstructions were performed. FINDINGS: Findings are positive for saddle embolus as well as significant pulmonary emboli involving both proximal segmental and subsegmental pulmonary bilateral pulmonary arteries. RV/ LV ratio is 1.8 findings are consistent with right-sided heart strain. . There are no focal parenchymal adenitis no pleural effusions. No pneumothoraces. No significant periaortic, pretracheal, parahilar or subcarinal adenopathy. Within the field-of-view the abdomen there is diffuse decreased attenuation of liver. This a nonspecific finding can be seen with fatty infiltration. Visualized osseous structures are grossly intact Osseous structures are intact. 1.  FINDINGS ARE POSITIVE FOR SADDLE EMBOLUS AS WELL AS SIGNIFICANT PULMONARY EMBOLI INVOLVING BOTH PROXIMAL SEGMENTAL AND SUBSEGMENTAL PULMONARY BILATERAL PULMONARY ARTERIES. 2.  VISUALIZED PULMONARY PARENCHYMA IS UNREMARKABLE.  DR. Nani Arshad OF THE EMERGENCY ROOM WAS NOTIFIED IMMEDIATELY OF THE ABOVE FINDINGS UPON COMPLETION EXAMINATION AT [address] [place] [address] 21, 2021 All CT scans at this facility use dose modulation, iterative reconstruction, and/or weight based dosing when appropriate to reduce radiation dose to as low as reasonably achievable. Xr Chest Portable    Result Date: 4/21/2021  Exam: XR CHEST PORTABLE History:  sob Technique: AP portable view of the chest obtained. Comparison: Chest x-ray from May 21, 2013 Chest x-ray portable Findings: The cardiomediastinal silhouette is within normal limits. There are no infiltrates, consolidations or effusions. Bones of the thorax appear intact. No radiographic evidence of acute intrathoracic process. Us Dup Lower Extremities Bilateral Venous    Result Date: 4/21/2021  EXAMINATION: US DUP LOWER EXTREMITIES BILATERAL VENOUS DATE AND TIME:4/21/2021 5:00 PM CLINICAL HISTORY: Leg pain. Leg swelling. massive PE; asses for DVTs       COMPARISON: None TECHNIQUE: The right lower extremity veins were evaluated with color doppler, gray scale imaging and spectral analysis while using compression and augmentation when possible. FINDINGS: Evaluation of the right  lower extremity from the thigh to the knee shows acute deep venous thrombosis in the distal right femoral vein, right popliteal vein, and peroneal veins. POSITIVE FOR ACUTE DEEP VENOUS THROMBOSIS IN RIGHT LEG. EXAMINATION: US DUP LOWER EXTREMITIES BILATERAL VENOUS DATE AND TIME:4/21/2021 5:00 PM CLINICAL HISTORY: Left leg pain and swelling. massive PE; asses for DVTs       COMPARISON: None TECHNIQUE: The left lower extremity veins were evaluated with color doppler, gray scale imaging and spectral analysis while using compression and augmentation when possible. FINDINGS: Evaluation of the left lower extremity from the thigh to the knee shows normal phasic flow, normal augmentation of the Doppler signal, and normal compression of the deep veins.   There is no sonographic evidence for acute deep venous thrombosis from the left groin to the popliteal region. IMPRESSION:NEGATIVE FOR ACUTE DVT OF THE LEFT LOWER EXTREMITY FROM THE  GROIN TO THE KNEE. ASSESSMENT AND PLAN  1. Acute unprovoked submassive saddle PE with acute cor pulmonale and RLE DVT. Pt clinically much improved after mechanical thrombectomy. No prior personal hx or family Hx of VTE. I agree with plan to convert his anticoagulant tx to Xarelto upon discharge. He will have thrombophilia work-up drawn prior to discharge today  The pt will follow-up in my office in 2 weeks for discussion of results of thrombophilia evaluation. Thank you, Dr. Yosi Li , for this consultation.     Electronically signed by Matthew Pederson MD on 4/22/2021 at 6:13 PM

## 2021-04-22 NOTE — CARE COORDINATION
[facility] AT [facility] Case Management Initial Discharge Assessment    The LSW met with the patient to discuss the discharge plan. PCP: Cyndi Chawla MD                                Date of Last Visit: The patient states March 2020 was his last appointment with his PCP    If no PCP, list provided? N/A    Discharge Planning    Living Arrangements: independently at home    Who do you live with? Alone    Who helps you with your care:  self    If lives at home:     Do you have any barriers navigating in your home? Yes    Patient can perform ADL? Yes    Current Services (outpatient and in home) :  None    Dialysis: No    Is transportation available to get to your appointments? Yes    DME Equipment:  Has cane (back issues at times); Bed side commode (was his mother's)    Respiratory equipment: Currently using his mother's as needed - Patient states his mother passed away 3/121228.  and RN updated. Has cpap    Respiratory provider:  Cpap - from [address] [place] [address] Avenue:  [address] [facility] with Medication Assistance Program?  CANDACE TATE called the patient and provided a starter pack for xarelto. Patient agreeable to [place] [id number]? N/A    Patient agreeable to SNF/Rehab? N/A    Other discharge needs identified? Home O2 eval?  (Patient is not currently on O2, but states he uses it at home as needed. The home O2 was his mother's.  Does Patient Have a High-Risk for Readmission Diagnosis (CHF, PN, MI, COPD)? Initial Discharge Plan? (Note: please see concurrent daily documentation for any updates after initial note). The patient states his discharge plan is to return home. Denies needs. Readmission Risk              Risk of Unplanned Readmission:        8            DCCOP was completed.    Electronically signed by Elijah Healy on 4/22/2021 at 12:30 PM

## 2021-04-22 NOTE — PROGRESS NOTES
INPATIENT PROGRESS NOTES    PATIENT NAME: Daron Soliz  MRN: [id number]  SERVICE DATE:  April 22, 2021   SERVICE TIME:  9:46 AM      PRIMARY SERVICE: Pulmonary Disease    CHIEF COMPLAIN: Bilateral pulmonary embolism status post PE thrombectomy  Right leg DVT    INTERVAL HPI: Patient seen and examined at bedside, Interval Notes, orders reviewed. Nursing notes noted  Patient is transfer out of ICU. He had a PE thrombectomy done and significant amount of blood clot was pulled out. He is doing much better. He is a on room air and his O2 saturation is 97%. He denies having short of breath or chest pain. He is currently on Lovenox and going home with oral anticoagulation with Xarelto. He has sleep apnea using CPAP therapy he wants to have a new CPAP. He said he missed appointment with me regarding sleep apnea but he will come for follow-up after discharge. OBJECTIVE    Body mass index is 38.93 kg/m². PHYSICAL EXAM:  Vitals:  BP (!) 140/87   Pulse 92   Temp 100 °F (37.8 °C) (Oral)   Resp 18   Wt 279 lb 1.6 oz (126.6 kg)   SpO2 97%   BMI 38.93 kg/m²   General: Obese, alert, awake . comfortable in bed, No distress. Head: Atraumatic , Normocephalic   Eyes: PERRL. No sclera icterus. No conjunctival injection. No discharge   ENT: No nasal  discharge. Pharynx clear. Neck:  Trachea midline. No thyromegaly, no JVD, No cervical adenopathy. Chest : Bilaterally symmetrical ,Normal effort,  No accessory muscle use  Lung : . Fair BS bilateral, decreased BS at bases. No Rales. No wheezing. No rhonchi. Heart[de-identified] Normal  rate. Regular rhythm. No mumur ,  Rub or gallop  ABD: Non-tender. Non-distended. No masses. No organmegaly. Normal bowel sounds. No hernia.   Ext : No Pitting both leg , No Cyanosis No clubbing  Neuro: no focal weakness          DATA:   Recent Labs     04/21/21  0857 04/21/21  0857 04/21/21  1322 04/22/21  0559   WBC 10.3  --   --  11.2*   HGB 16.8   < > 15.6 13.6*   HCT 50.9  --   --  41.9*   MCV 92.0 --   --  92.5     --   --  185    < > = values in this interval not displayed. Recent Labs     04/21/21  0856 04/21/21  0856 04/21/21  1322 04/22/21  0559     --   --  140   K 4.2  --   --  4.0     --   --  108*   CO2 19*  --   --  19*   BUN 15  --   --  12   CREATININE 1.01   < > 0.7* 0.82   GLUCOSE 212*  --   --  137*   CALCIUM 9.3  --   --  8.6   PROT 7.8  --   --   --    LABALBU 4.3  --   --   --    BILITOT 0.7  --   --   --    ALKPHOS 77  --   --   --    AST 20  --   --   --    ALT 35  --   --   --    LABGLOM >60.0   < > >60 >60.0   GFRAA >60.0   < > >60 >60.0   GLOB 3.5  --   --   --     < > = values in this interval not displayed. MV Settings:          Recent Labs     04/21/21  1322   PHART 7.355   SDB4YDE 37   PO2ART 27*   OCA7KWQ 20.5*   BEART -5*   Z4XPGBPT 47*       O2 Device: None (Room air)  O2 Flow Rate (L/min): 2 L/min    DIET CARDIAC; No Caffeine     MEDICATIONS during current hospitalization:    Continuous Infusions:   sodium chloride      sodium chloride 75 mL/hr at 04/21/21 1818       Scheduled Meds:   sodium chloride flush  5-40 mL Intravenous 2 times per day    aspirin  81 mg Oral Daily    atorvastatin  80 mg Oral Nightly    predniSONE  50 mg Oral Once    diphenhydrAMINE  50 mg Oral Once    hydrocortisone sodium succinate PF  200 mg Intravenous Once    enoxaparin  1 mg/kg Subcutaneous BID    melatonin  3 mg Oral Nightly       PRN Meds:sodium chloride flush, sodium chloride, promethazine **OR** ondansetron, acetaminophen **OR** acetaminophen, polyethylene glycol, nitroGLYCERIN, acetaminophen, hydrALAZINE, labetalol    Radiology  Cta Chest W Wo Contrast    Result Date: 4/21/2021  The EXAMINATION: CT scan of the chest with contrast (pulmonary embolism protocol) INDICATION: Chest pain and shortness of breath.  COMPARISON: None TECHNIQUE: Helical CT was performed through the chest utilizing 100 cc of Isovue-300 intravenous contrast.  Images were obtained with bolus tracking in order to opacify the pulmonary arteries. Both MIP and 3D volume rendered reconstructions were performed. FINDINGS: Findings are positive for saddle embolus as well as significant pulmonary emboli involving both proximal segmental and subsegmental pulmonary bilateral pulmonary arteries. RV/ LV ratio is 1.8 findings are consistent with right-sided heart strain. . There are no focal parenchymal adenitis no pleural effusions. No pneumothoraces. No significant periaortic, pretracheal, parahilar or subcarinal adenopathy. Within the field-of-view the abdomen there is diffuse decreased attenuation of liver. This a nonspecific finding can be seen with fatty infiltration. Visualized osseous structures are grossly intact Osseous structures are intact. 1.  FINDINGS ARE POSITIVE FOR SADDLE EMBOLUS AS WELL AS SIGNIFICANT PULMONARY EMBOLI INVOLVING BOTH PROXIMAL SEGMENTAL AND SUBSEGMENTAL PULMONARY BILATERAL PULMONARY ARTERIES. 2.  VISUALIZED PULMONARY PARENCHYMA IS UNREMARKABLE. DR. Desirae Horvath OF THE EMERGENCY ROOM WAS NOTIFIED IMMEDIATELY OF THE ABOVE FINDINGS UPON COMPLETION EXAMINATION AT [address] [place] [address] 21, 2021 All CT scans at this facility use dose modulation, iterative reconstruction, and/or weight based dosing when appropriate to reduce radiation dose to as low as reasonably achievable. Xr Chest Portable    Result Date: 4/21/2021  Exam: XR CHEST PORTABLE History:  sob Technique: AP portable view of the chest obtained. Comparison: Chest x-ray from May 21, 2013 Chest x-ray portable Findings: The cardiomediastinal silhouette is within normal limits. There are no infiltrates, consolidations or effusions. Bones of the thorax appear intact. No radiographic evidence of acute intrathoracic process. Us Dup Lower Extremities Bilateral Venous    Result Date: 4/21/2021  EXAMINATION: US DUP LOWER EXTREMITIES BILATERAL VENOUS DATE AND TIME:4/21/2021 5:00 PM CLINICAL HISTORY: Leg pain. Leg swelling. massive PE; asses for DVTs       COMPARISON: None TECHNIQUE: The right lower extremity veins were evaluated with color doppler, gray scale imaging and spectral analysis while using compression and augmentation when possible. FINDINGS: Evaluation of the right  lower extremity from the thigh to the knee shows acute deep venous thrombosis in the distal right femoral vein, right popliteal vein, and peroneal veins. POSITIVE FOR ACUTE DEEP VENOUS THROMBOSIS IN RIGHT LEG. EXAMINATION: US DUP LOWER EXTREMITIES BILATERAL VENOUS DATE AND TIME:4/21/2021 5:00 PM CLINICAL HISTORY: Left leg pain and swelling. massive PE; asses for DVTs       COMPARISON: None TECHNIQUE: The left lower extremity veins were evaluated with color doppler, gray scale imaging and spectral analysis while using compression and augmentation when possible. FINDINGS: Evaluation of the left lower extremity from the thigh to the knee shows normal phasic flow, normal augmentation of the Doppler signal, and normal compression of the deep veins. There is no sonographic evidence for acute deep venous thrombosis from the left groin to the popliteal region. IMPRESSION:NEGATIVE FOR ACUTE DVT OF THE LEFT LOWER EXTREMITY FROM THE  GROIN TO THE KNEE. IMPRESSION AND SUGGESTION:  1. Bilateral saddle PE with cor pulmonale and right leg DVT status post PE thrombectomy  2. Shortness of breath resolved after PE thrombectomy  3. ROSA on CPAP    Patient is going home today with p.o. oral anticoagulation with Xarelto. Discussed with Dr. Rosa Alvarez. He said he is also going to have evaluation by hematooncology before he discharged home today. CT chest reviewed. He said he wants to have a new CPAP machine. Advised he will follow-up with me in office 4 weeks. He is on room air O2 saturation 97% okay to discharge when okay by all physician.     I spent more than 35 min with this patient's care , greater the 50% of this time was spent in counseling and/or coordinating of care. NOTE: This report was transcribed using voice recognition software. Every effort was made to ensure accuracy; however, inadvertent computerized transcription errors may be present.       Electronically signed by Diana Gonzalez MD, FCCP on 4/22/2021 at 9:46 AM

## 2021-04-22 NOTE — FLOWSHEET NOTE
Report from [facility]. Assessment and vital signs completed as charted. Patient ambulated to toilet with minimal assistance. Denies any numbness or tingling. Dr. Jorge Cardona ordered for patient to transfer to [address] [place] [address].  Report called to [facility] [address].  Prelim results from lower extremity ultrasound shows right DVT thigh to knee. Dr. Jorge Cardona informed and no new orders at this time. Patient transferred via wheelchair to [address] [place] [address] with patient belongings.

## 2021-04-22 NOTE — PROGRESS NOTES
Nutrition Assessment    Type and Reason for Visit:  Initial, Positive Nutrition Screen(poor po)    Nutrition Recommendations/Plan:   Carb Control 5 added to Cardiac diet restriction    Nutrition Assessment:  Nutritional status adequate at this time. Pt had c/o decreased appetite due to SOB, PTA, which he reports has improved. Ate 100% of lunch. Noted during chart review hx of DM with glucose > 200, Carb Control added to diet.  Pt denied any need for education , states he should be d/c'd later  today    Malnutrition Assessment:  Malnutrition Status:  No malnutrition    Context:  Chronic Illness     Findings of the 6 clinical characteristics of malnutrition:  Energy Intake:  No significant decrease in energy intake  Weight Loss:  No significant weight loss     Body Fat Loss:  No significant body fat loss     Muscle Mass Loss:  No significant muscle mass loss    Fluid Accumulation:  No significant fluid accumulation     Strength:  Not Performed    Wounds:  None       Current Nutrition Therapies:    DIET CARDIAC; Carb Control: 5 carb choices (75 gms)/meal; No Caffeine    Anthropometric Measures:  · Height: 5' 11\" (180.3 cm)  · Current Body Weight: 279 lb (126.6 kg)   · Admission Body Weight: 293 lb (132.9 kg)(stated)    · Usual Body Weight: 289 lb (131.1 kg)(2/2020)     · Ideal Body Weight: 172 lbs;  · BMI: 38.9  · BMI Categories: Obese Class 2 (BMI 35.0 -39.9)       Nutrition Diagnosis:   · Altered nutrition-related lab values related to endocrine dysfuntion as evidenced by lab values    Nutrition Interventions:   Food and/or Nutrient Delivery:  Modify Current Diet  Nutrition Education/Counseling:  Education declined   Coordination of Nutrition Care:  No recommendation at this time, Continue to monitor while inpatient    Goals:  po > 75%, gluc <160       Nutrition Monitoring and Evaluation:     Food/Nutrient Intake Outcomes:  Food and Nutrient Intake  Physical Signs/Symptoms Outcomes:  Meal Time Behavior, Biochemical Data     Discharge Planning:    No discharge needs at this time     Electronically signed by Prentice Ahumada, RD, LD on 4/22/21 at 12:03 PM EDT

## 2021-04-22 NOTE — DISCHARGE SUMMARY
Discharge Summary    Date: 4/22/2021  Patient Name: Odalis Sosa YOB: 1965 Age: 60 y.o. Admit Date: 4/21/2021  Discharge Date: 4/22/2021  Discharge Condition: Good    Admission Diagnosis  Acute saddle pulmonary embolism with acute cor pulmonale (HCC) (I26.02)     Discharge Diagnosis  Active Problems: Acute saddle pulmonary embolism with acute cor pulmonale (HCC)Resolved Problems: * No resolved hospital problems. [facility] Stay  Narrative of Hospital Course:  Patient presented with severe shortness of breath/dyspnea on exertion. He was noted to have saddle pulmonary embolism with RV strain and mildly elevated cardiac enzyme. He underwent bilateral main pulmonary artery PE thrombectomy with INR he device with significant improvement in his symptoms. Patient was noted to have a distal right femoral vein/popliteal and tibial vein DVT. He was initiated on oral anticoagulation with Xarelto 15 mg twice daily for 21 days then 20 mg daily after that. Pulmonary was consulted. Heme-onc was consulted as well. Patient was discharged in stable and satisfactory condition. Consultants:  IP CONSULT TO CARDIOLOGYIP CONSULT TO PHARMACYIP CONSULT TO RESPIRATORY CARE    Surgeries/procedures Performed:       Treatments:    Cardiac Medications    Other    Discharge Plan/Disposition:  Home    Hospital/Incidental Findings Requiring Follow Up:    Patient Instructions:    Diet: Cardiac Diet    Activity:No Lifting, Driving or Strenuous Excercise  For number of days (if applicable): Other Instructions:    Provider Follow-Up:   No follow-ups on file. Significant Diagnostic Studies:    Recent Labs:  Admission on 04/21/2021aPTT                                          Date: 04/21/2021Value: 29.6        Ref range: 24.4 - 36.8 sec    Status: Final              Comment: Effective 11/4/2020:Heparin Therapeutic Range: 64.0 - 98.0 seconds. Total CK                                      Date: 04/21/2021Value: 162 Ref range: 0 - 190 U/L        Status: FinalTroponin                                      Date: 04/21/2021Value: 0.031*      Ref range: 0.000 - 0.010 ng*  Status: Final              Comment: Methodology by Troponin T. Protime                                       Date: 04/21/2021Value: 14.8        Ref range: 12.3 - 14.9 sec    Status: FinalINR                                           Date: 04/21/2021Value: 1.2           Status: [address] [facility]                                           Date: 04/21/2021Value: 10.3        Ref range: 4.8 - 10.8 K/uL    Status: FinalRBC                                           Date: 04/21/2021Value: 5.53        Ref range: 4.70 - 6.10 M/uL   Status: FinalHemoglobin                                    Date: 04/21/2021Value: 16.8        Ref range: 14.0 - 18.0 g/dL   Status: FinalHematocrit                                    Date: 04/21/2021Value: 50.9        Ref range: 42.0 - 52.0 %      Status: FinalMCV                                           Date: 04/21/2021Value: 92.0        Ref range: 80.0 - 100.0 fL    Status: [facility]                                           Date: 04/21/2021Value: 30.3        Ref range: 27.0 - 31.3 pg     Status: [address]                                          Date: 04/21/2021Value: 32.9*       Ref range: 33.0 - 37.0 %      Status: FinalRDW                                           Date: 04/21/2021Value: 14.4        Ref range: 11.5 - 14.5 %      Status: FinalPlatelets                                     Date: 04/21/2021Value: 211         Ref range: 130 - 400 K/uL     Status: FinalNeutrophils %                                 Date: 04/21/2021Value: 47.5        Ref range: %                  Status: FinalLymphocytes %                                 Date: 04/21/2021Value: 40.5        Ref range: %                  Status: FinalMonocytes %                                   Date: 04/21/2021Value: 10.1        Ref range: %                  Status: FinalEosinophils % Date: 04/21/2021Value: 1.2         Ref range: %                  Status: FinalBasophils %                                   Date: 04/21/2021Value: 0.7         Ref range: %                  Status: FinalNeutrophils Absolute                          Date: 04/21/2021Value: 4.9         Ref range: 1.4 - 6.5 K/uL     Status: FinalLymphocytes Absolute                          Date: 04/21/2021Value: 4.2         Ref range: 1.0 - 4.8 K/uL     Status: FinalMonocytes Absolute                            Date: 04/21/2021Value: 1.0*        Ref range: 0.2 - 0.8 K/uL     Status: FinalEosinophils Absolute                          Date: 04/21/2021Value: 0.1         Ref range: 0.0 - 0.7 K/uL     Status: FinalBasophils Absolute                            Date: 04/21/2021Value: 0.1         Ref range: 0.0 - 0.2 K/uL     Status: FinalSodium                                        Date: 04/21/2021Value: 137         Ref range: 135 - 144 mEq/L    Status: FinalPotassium                                     Date: 04/21/2021Value: 4.2         Ref range: 3.4 - 4.9 mEq/L    Status: FinalChloride                                      Date: 04/21/2021Value: 102         Ref range: 95 - 107 mEq/L     Status: FinalCO2                                           Date: 04/21/2021Value: 19*         Ref range: 20 - 31 mEq/L      Status: FinalAnion Gap                                     Date: 04/21/2021Value: 16*         Ref range: 9 - 15 mEq/L       Status: FinalGlucose                                       Date: 04/21/2021Value: 212*        Ref range: 70 - 99 mg/dL      Status: FinalBUN                                           Date: 04/21/2021Value: 15          Ref range: 6 - 20 mg/dL       Status: FinalCREATININE                                    Date: 04/21/2021Value: 1.01        Ref range: 0.70 - 1.20 mg/dL  Status: FinalGFR Non-                      Date: 04/21/2021Value: >60.0       Ref range: >60 Status: Final              Comment: >60 mL/min/1.73m2 EGFR, calc. for ages 25 and older using theMDRD formula (not corrected for weight), is valid for stablerenal function. GFR                           Date: 04/21/2021Value: >60.0       Ref range: >60                Status: Final              Comment: >60 mL/min/1.73m2 EGFR, calc. for ages 25 and older using theMDRD formula (not corrected for weight), is valid for stablerenal function. Calcium                                       Date: 04/21/2021Value: 9.3         Ref range: 8.5 - 9.9 mg/dL    Status: FinalTotal Protein                                 Date: 04/21/2021Value: 7.8         Ref range: 6.3 - 8.0 g/dL     Status: FinalAlbumin                                       Date: 04/21/2021Value: 4.3         Ref range: 3.5 - 4.6 g/dL     Status: FinalTotal Bilirubin                               Date: 04/21/2021Value: 0.7         Ref range: 0.2 - 0.7 mg/dL    Status: FinalAlkaline Phosphatase                          Date: 04/21/2021Value: 77          Ref range: 35 - 104 U/L       Status: FinalALT                                           Date: 04/21/2021Value: 35          Ref range: 0 - 41 U/L         Status: FinalAST                                           Date: 04/21/2021Value: 20          Ref range: 0 - 40 U/L         Status: FinalGlobulin                                      Date: 04/21/2021Value: 3.5         Ref range: 2.3 - 3.5 g/dL     Status: FinalVentricular Rate                              Date: 04/21/2021Value: 109         Ref range: BPM                Status: FinalAtrial Rate                                   Date: 04/21/2021Value: 109         Ref range: BPM                Status: FinalP-R Interval                                  Date: 04/21/2021Value: 136         Ref range: ms                 Status: FinalQRS Duration                                  Date: 04/21/2021Value: 88          Ref range: ms                 Status: FinalQ-T Interval                                  Date: 04/21/2021Value: 360         Ref range: ms                 Status: FinalQTc Calculation (Bazett)                      Date: 04/21/2021Value: 484         Ref range: ms                 Status: FinalP Axis                                        Date: 04/21/2021Value: 49          Ref range: degrees            Status: FinalR Axis                                        Date: 04/21/2021Value: -26         Ref range: degrees            Status: FinalT Axis                                        Date: 04/21/2021Value: 80          Ref range: degrees            Status: [id number], NAAT                              Date: 04/21/2021Value: Not Detected                   Ref range: Not Detected       Status: Final              Comment: Rapid NAAT:   Negative results should be treated as presumptive and,if inconsistent with clinical signs and symptoms or necessary forpatient management, should be tested with an alternative molecularassay. Negative results do not preclude SARS-CoV-2 infection andshould not be used as the sole basis for patient management decisions. This test has been authorized by the FDA under an Emergency UseAuthorization (EUA) for use by authorized laboratories. Fact sheet for Healthcare [employer] sheet for Patients: Niharika.dk: Isothermal Nucleic Acid AmplificationLactic Acid                                   Date: 04/21/2021Value: 4.0*        Ref range: 0.5 - 2.2 mmol/L   Status: FinalProcalcitonin                                 Date: 04/21/2021Value: 0.06        Ref range: 0.00 - 0.15 ng/mL  Status: Final              Comment: Suspected Sepsis:Low likelihood of sepsis  <.50 ng/mLIncreased likelihood of sepsis 0.50-2.00 ng/mLAntibiotics encouragedHigh risk of sepsis/shock   >2.00 ng/mLAntibiotics strongly encouragedSuspected Lower Respiratory Tract Infections:Low likelihood of bacterial infection  <0.24 ng/mLIncreased likelihood of bacterial infection >0.24 ng/mLAntibiotics encouragedWith successful antibiotic therapy, PCT levels should decreaserapidly. (Half-life of 24 to 36 hours. )Procalcitonin values from samples collected within the first6 hours of systemic infection may still be low. Retesting may be indicated. Values from day 1 and day 4 can be entered into the Change inProcalcitonin Calculator to determine the patient'sMortality Risk http://www.burt.info/. com)In healthy neonates, plasma Procalcitonin (PCT) concentrationsincrease gradually after birth, reaching peak values at about24 hours of age then decrease to normal values below 0.5                        ng/mLby 48-72 hours of age. D-Dimer, Quant                                Date: 04/21/2021Value: >20.00*     Ref range: 0.00 - 0.50 mg/L*  Status: Final              Comment: D dimer called to Dr Dieudonne Arango  04/21/2021  10:30. AJVTE (DVT or PE) cut-off = 0.50 mg/L FEUPOC Sodium                                    Date: 04/21/2021Value: 140         Ref range: 136 - 145 mEq/L    Status: FinalPOC Potassium                                 Date: 04/21/2021Value: 4.1         Ref range: 3.5 - 5.1 mEq/L    Status: FinalPOC Chloride                                  Date: 04/21/2021Value: 108         Ref range: 99 - 110 mEq/L     Status: FinalPOC Glucose                                   Date: 04/21/2021Value: 218*        Ref range: 60 - 115 mg/dl     Status: FinalPOC Creatinine                                Date: 04/21/2021Value: 1.3         Ref range: 0.9 - 1.3 mg/dL    Status: FinalGFR Non-                      Date: 04/21/2021Value: 62*         Ref range: >60                Status: Final              Comment: >60 mL/min/1.73m2 EGFR, calc. for ages 25 and older using theMDRD formula (not corrected for weight), is valid for stablerenal function. GFR                           Date: Date: 04/21/2021Value: 0.024*      Ref range: 0.000 - 0.010 ng*  Status: Final              Comment: Methodology by Troponin TJessiePOC Sodium                                    Date: 04/21/2021Value: 141         Ref range: 136 - 145 mEq/L    Status: FinalPOC Potassium                                 Date: 04/21/2021Value: 4.1         Ref range: 3.5 - 5.1 mEq/L    Status: FinalPOC Chloride                                  Date: 04/21/2021Value: 110         Ref range: 99 - 110 mEq/L     Status: FinalPOC Glucose                                   Date: 04/21/2021Value: 164*        Ref range: 60 - 115 mg/dl     Status: FinalPOC Creatinine                                Date: 04/21/2021Value: 0.7*        Ref range: 0.9 - 1.3 mg/dL    Status: FinalGFR Non-                      Date: 04/21/2021Value: >60         Ref range: >60                Status: Final              Comment: >60 mL/min/1.73m2 EGFR, calc. for ages 25 and older using theMDRD formula (not corrected for weight), is valid for stablerenal function. GFR                           Date: 04/21/2021Value: >60         Ref range: >60                Status: Final              Comment: >60 mL/min/1.73m2 EGFR, calc. for ages 25 and older using theMDRD formula (not corrected for weight), is valid for stablerenal function. Calcium, Ion                                  Date: 04/21/2021Value: 1.09*       Ref range: 1.12 - 1.32 mmol*  Status: FinalpH, Arterial                                  Date: 04/21/2021Value: 7.355       Ref range: 7.350 - 7.450      Status: FinalpCO2, Arterial                                Date: 04/21/2021Value: 37          Ref range: 35 - 45 mm Hg      Status: FinalpO2, Arterial                                 Date: 04/21/2021Value: 27*         Ref range: 75 - 108 mm Hg     Status: FinalHCO3, Arterial                                Date: 04/21/2021Value: 20.5*       Ref range: 21.0 - 29.0 mmol*  Status: FinalBase Excess, Arterial                         Date: 04/21/2021Value: -5*         Ref range: -3 - 3             Status: FinalO2 Sat, Arterial                              Date: 04/21/2021Value: 52*         Ref range: 93 - 100 %         Status: FinalTCO2, Arterial                                Date: 04/21/2021Value: 22          Ref range: 22 - 29            Status: FinalLactate                                       Date: 04/21/2021Value: 1.82        Ref range: 0.40 - 2.00 mmol*  Status: FinalPOC Hematocrit                                Date: 04/21/2021Value: 46          Ref range: 41 - 53 %          Status: FinalHemoglobin                                    Date: 04/21/2021Value: 15.6        Ref range: 13.5 - 17.5 gm/dL  Status: FinalSample Type                                   Date: 04/21/2021Value: ART           Status: FinalPerformed on                                  Date: 04/21/2021Value: SEE BELOW     Status: Final              Comment: Performed on POCSample Type: ArterialDraw site: PALactic Acid                                   Date: 04/21/2021Value: 2.0         Ref range: 0.5 - 2.2 mmol/L   Status: FinalMagnesium                                     Date: 04/22/2021Value: 2.0         Ref range: 1.7 - 2.4 mg/dL    Status: FinalCholesterol, Total                            Date: 04/22/2021Value: 169         Ref range: 0 - 199 mg/dL      Status: Final              Comment: ATP III Cholesterol classification is Desirable. Triglycerides                                 Date: 04/22/2021Value: 173*        Ref range: 0 - 150 mg/dL      Status: Final              Comment: ATP III Triglycerides Classification is Borderline High. HDL                                           Date: 04/22/2021Value: 28*         Ref range: 40 - 59 mg/dL      Status: Final              Comment: ATP III HDL Cholestrol Classification is low. Expected Values:Males:    >55 = No Risk          35-55 = Moderate Risk <35 = High RiskFemales:  >65 = No Risk          45-65 = Moderate Risk          <45 = High RiskNCEP Guidelines:   Third Report May 2001>59 = negative risk factor for CHD<40 = major risk factor for CHDLDL Calculated                                Date: 04/22/2021Value: 106         Ref range: 0 - 129 mg/dL      Status: Final              Comment: ATP III LDL Classification is Near Optimal.WBC                                           Date: 04/22/2021Value: 11.2*       Ref range: 4.8 - 10.8 K/uL    Status: FinalRBC                                           Date: 04/22/2021Value: 4.54*       Ref range: 4.70 - 6.10 M/uL   Status: FinalHemoglobin                                    Date: 04/22/2021Value: 13.6*       Ref range: 14.0 - 18.0 g/dL   Status: FinalHematocrit                                    Date: 04/22/2021Value: 41.9*       Ref range: 42.0 - 52.0 %      Status: FinalMCV                                           Date: 04/22/2021Value: 92.5        Ref range: 80.0 - 100.0 fL    Status: [address] [place]                                           Date: 04/22/2021Value: 30.0        Ref range: 27.0 - 31.3 pg     Status: [address]                                          Date: 04/22/2021Value: 32.5*       Ref range: 33.0 - 37.0 %      Status: FinalRDW                                           Date: 04/22/2021Value: 13.9        Ref range: 11.5 - 14.5 %      Status: FinalPlatelets                                     Date: 04/22/2021Value: 185         Ref range: 130 - 400 K/uL     Status: FinalSodium                                        Date: 04/22/2021Value: 140         Ref range: 135 - 144 mEq/L    Status: FinalPotassium                                     Date: 04/22/2021Value: 4.0         Ref range: 3.4 - 4.9 mEq/L    Status: FinalChloride                                      Date: 04/22/2021Value: 108*        Ref range: 95 - 107 mEq/L     Status: FinalCO2                                           Date: 04/22/2021Value: 19* Ref range: 20 - 31 mEq/L      Status: FinalAnion Gap                                     Date: 04/22/2021Value: 13          Ref range: 9 - 15 mEq/L       Status: FinalGlucose                                       Date: 04/22/2021Value: 137*        Ref range: 70 - 99 mg/dL      Status: FinalBUN                                           Date: 04/22/2021Value: 12          Ref range: 6 - 20 mg/dL       Status: FinalCREATININE                                    Date: 04/22/2021Value: 0.82        Ref range: 0.70 - 1.20 mg/dL  Status: FinalGFR Non-                      Date: 04/22/2021Value: >60.0       Ref range: >60                Status: Final              Comment: >60 mL/min/1.73m2 EGFR, calc. for ages 25 and older using theMDRD formula (not corrected for weight), is valid for stablerenal function. GFR                           Date: 04/22/2021Value: >60.0       Ref range: >60                Status: Final              Comment: >60 mL/min/1.73m2 EGFR, calc. for ages 25 and older using theMDRD formula (not corrected for weight), is valid for stablerenal function. Calcium                                       Date: 04/22/2021Value: 8.6         Ref range: 8.5 - 9.9 mg/dL    Status: Final------------    Radiology last 7 days:  Cta Chest W Wo ContrastResult Date: 4/21/20211. FINDINGS ARE POSITIVE FOR SADDLE EMBOLUS AS WELL AS SIGNIFICANT PULMONARY EMBOLI INVOLVING BOTH PROXIMAL SEGMENTAL AND SUBSEGMENTAL PULMONARY BILATERAL PULMONARY ARTERIES. 2.  VISUALIZED PULMONARY PARENCHYMA IS UNREMARKABLE. DR. Suzette Harmon OF THE EMERGENCY ROOM WAS NOTIFIED IMMEDIATELY OF THE ABOVE FINDINGS UPON COMPLETION EXAMINATION AT [address] [place] [address] 21, 2021 All CT scans at this facility use dose modulation, iterative reconstruction, and/or weight based dosing when appropriate to reduce radiation dose to as low as reasonably achievable.  Xr Chest PortableResult Date: 4/21/2021No radiographic evidence of acute intrathoracic process. Us Dup Lower Extremities Bilateral VenousResult Date: 4/21/2021POSITIVE FOR ACUTE DEEP VENOUS THROMBOSIS IN RIGHT LEG. EXAMINATION: US DUP LOWER EXTREMITIES BILATERAL VENOUS DATE AND TIME:4/21/2021 5:00 PM CLINICAL HISTORY: Left leg pain and swelling. massive PE; asses for DVTs       COMPARISON: None TECHNIQUE: The left lower extremity veins were evaluated with color doppler, gray scale imaging and spectral analysis while using compression and augmentation when possible. FINDINGS: Evaluation of the left lower extremity from the thigh to the knee shows normal phasic flow, normal augmentation of the Doppler signal, and normal compression of the deep veins. There is no sonographic evidence for acute deep venous thrombosis from the left groin to the popliteal region. IMPRESSION:NEGATIVE FOR ACUTE DVT OF THE LEFT LOWER EXTREMITY FROM THE  GROIN TO THE KNEE. Pending Labs   Order Current Status  Culture, Blood 1 In process  Culture, Blood 2 In process      Discharge Medications    Current Discharge Medication ListSTART taking these medicationsrivaroxaban 15 & 20 MG Starter PackTake as directed on package. Qty: 1 Package Refills: 0rivaroxaban (XARELTO) 20 MG TABS tabletTake 1 tablet by mouth daily (with breakfast)Qty: 30 tablet Refills: 1    Current Discharge Medication List    Current Discharge Medication ListCONTINUE these medications which have NOT CHANGEDfamotidine (PEPCID) 20 MG tabletTake 20 mg by mouth 2 times daily as neededCPAP Machine MISC11 cm by Does not apply routelisinopril (PRINIVIL;ZESTRIL) 10 MG tabletTake 10 mg by mouth dailymetFORMIN (GLUCOPHAGE) 500 MG tabletTake 500 mg by mouth 2 times daily (with meals)ibuprofen (ADVIL;MOTRIN) 200 MG tabletTake 200 mg by mouth every 6 hours as needed for Painranitidine (ZANTAC 150 MAXIMUM STRENGTH) 150 MG tabletTake 150 mg by mouth 2 times daily    Current Discharge Medication List    Time Spent on Discharge:2E] minutes were spent in patient examination, evaluation, counseling as well as medication reconciliation, prescriptions for required medications, discharge plan, and follow up.     Electronically signed by Amber Yancey DO on 4/22/21 at 9:24 AM EDT

## 2021-04-22 NOTE — FLOWSHEET NOTE
0200- Pt awoke from sleeping to wash up stating he could feel and smell dried blood. Pt had removed CPap and standing next to sink with IV pole with him. After pt returned to bed, this RN checked insertion site on right groin. Dressing was peeling off, however site was clean and intact. Changed dressing and cleaned more dried blood from pt leg. Gave pt new gown and NC, set O2 to 2L as pt stated he did not want CPap re-applied this evening. Pt stated he felt much better.

## 2021-04-23 NOTE — PROGRESS NOTES
Physician Progress Note      Haley Trammell  CSN #:                  [id number]  :                       1965  ADMIT DATE:       2021 8:41 AM  DISCH DATE:        2021 7:40 PM  RESPONDING  PROVIDER #:        Lázaro Duran MD          QUERY TEXT:    Pt admitted with saddle pulmonary embolus with acute cor pulmonale and has   respiratory failure documented. If possible, please document in progress notes   and discharge summary further specificity regarding the type and acuity of   respiratory failure: The medical record reflects the following:  Risk Factors: saddle pulmonary embolus, DVT  Clinical Indicators: SOB, ABG 7.355/37/27/20.5/47% on RA, venous blood gases   7.290/43.6/27/21.0/44% on 4L lactic acid 4.0, troponin . 031/.024, d-dimer   >20.00, 7.355/37/27/20.5/47%, RR 18-37, -110  CT chest FINDINGS ARE   POSITIVE FOR SADDLE EMBOLUS AS WELL AS SIGNIFICANT PULMONARY EMBOLI INVOLVING   BOTH PROXIMAL SEGMENTAL AND SUBSEGMENTAL PULMONARY BILATERAL PULMONARY   ARTERIES. Treatment: pulmonology consult, thrombectomy, heparin    Yumiko DRAKEN, RN, [facility]  [phone]  Options provided:  -- Acute respiratory failure with hypoxia  -- Other - I will add my own diagnosis  -- Disagree - Not applicable / Not valid  -- Disagree - Clinically unable to determine / Unknown  -- Refer to Clinical Documentation Reviewer    PROVIDER RESPONSE TEXT:    This patient is in acute respiratory failure with hypoxia.     Query created by: Yanick Day on 2021 9:37 AM      Electronically signed by:  Lázaro Duran MD 2021 11:39 AM

## 2021-04-25 LAB
ANTICARDIOLIPIN IGA ANTIBODY: 4 APL (ref 0–11)
ANTICARDIOLIPIN IGG ANTIBODY: 0 GPL (ref 0–14)
BETA-2 GLYCOPROTEIN 1 IGA ANTIBODY: 5 SAU (ref 0–20)
BETA-2 GLYCOPROTEIN 1 IGG ANTIBODY: 0 SGU (ref 0–20)
BETA-2 GLYCOPROTEIN 1 IGM ANTIBODY: 0 SMU (ref 0–20)
CARDIOLIPIN AB IGM: 0 MPL (ref 0–12)
PROTEIN C FUNCTIONAL: 96 % (ref 83–168)
PROTEIN S, FUNCTIONAL: 85 % (ref 66–143)

## 2021-04-26 ENCOUNTER — OFFICE VISIT (OUTPATIENT)
Dept: PULMONOLOGY | Age: 56
End: 2021-04-26
Payer: COMMERCIAL

## 2021-04-26 VITALS
HEART RATE: 68 BPM | SYSTOLIC BLOOD PRESSURE: 138 MMHG | HEIGHT: 71 IN | WEIGHT: 280 LBS | TEMPERATURE: 97.5 F | OXYGEN SATURATION: 98 % | DIASTOLIC BLOOD PRESSURE: 80 MMHG | BODY MASS INDEX: 39.2 KG/M2

## 2021-04-26 DIAGNOSIS — E66.9 OBESITY (BMI 30-39.9): ICD-10-CM

## 2021-04-26 DIAGNOSIS — G47.33 OSA ON CPAP: Primary | ICD-10-CM

## 2021-04-26 DIAGNOSIS — I26.02 ACUTE SADDLE PULMONARY EMBOLISM WITH ACUTE COR PULMONALE (HCC): ICD-10-CM

## 2021-04-26 DIAGNOSIS — Z99.89 OSA ON CPAP: Primary | ICD-10-CM

## 2021-04-26 LAB
BLOOD CULTURE, ROUTINE: NORMAL
CULTURE, BLOOD 2: NORMAL
FACTOR VIII ACTIVITY: 239 % (ref 56–191)

## 2021-04-26 PROCEDURE — 1111F DSCHRG MED/CURRENT MED MERGE: CPT | Performed by: INTERNAL MEDICINE

## 2021-04-26 PROCEDURE — 1036F TOBACCO NON-USER: CPT | Performed by: INTERNAL MEDICINE

## 2021-04-26 PROCEDURE — 99214 OFFICE O/P EST MOD 30 MIN: CPT | Performed by: INTERNAL MEDICINE

## 2021-04-26 PROCEDURE — 3017F COLORECTAL CA SCREEN DOC REV: CPT | Performed by: INTERNAL MEDICINE

## 2021-04-26 PROCEDURE — G8427 DOCREV CUR MEDS BY ELIG CLIN: HCPCS | Performed by: INTERNAL MEDICINE

## 2021-04-26 PROCEDURE — G8417 CALC BMI ABV UP PARAM F/U: HCPCS | Performed by: INTERNAL MEDICINE

## 2021-04-26 ASSESSMENT — ENCOUNTER SYMPTOMS
DIARRHEA: 0
RHINORRHEA: 0
SHORTNESS OF BREATH: 0
ABDOMINAL PAIN: 0
CHEST TIGHTNESS: 0
SORE THROAT: 0
EYE ITCHING: 0
COUGH: 0
VOMITING: 0
VOICE CHANGE: 0
NAUSEA: 0
WHEEZING: 0

## 2021-04-26 NOTE — PROGRESS NOTES
Subjective:     Wilian Christian is a 60 y.o. male who complains today of:     Chief Complaint   Patient presents with    Follow-Up from Hospital     f/u from hospital also  epatient needs a new C PAP     Sleep Apnea       HPI  Patient was seen 4 yrs ago . He is not on CPAP for last 1 weeks . He said his CPAP . He need new CPAP   He had CPAP with 11 cm   He was using CPAP for about  6   hours every night. He was  using CPAP with nasal  Mask. He said  sleep is not   restful without the CPAP use. He was compliant with CPAP therapy and benefiting with CPAP use before it got broke. .  C/o  snoring without  CPAP use. He has no c/o vivid dreams or night tran. complaint of daytime sleepiness or tiredness without CPAP use. He denies taking daily naps. He is not driving   He denies difficulty falling asleep or staying asleep. Allergies:  Quinolones  Past Medical History:   Diagnosis Date    Chronic back pain     Diabetes ([address]     GERD (gastroesophageal reflux disease)     High cholesterol     Hypertension     IBS (irritable bowel syndrome)     Kidney, malrotation     Sleep apnea      No past surgical history on file.   Family History   Problem Relation Age of Onset    Asthma Mother     Heart Disease Father      Social History     Socioeconomic History    Marital status: Single     Spouse name: Not on file    Number of children: Not on file    Years of education: Not on file    Highest education level: Not on file   Occupational History    Not on file   Social Needs    Financial resource strain: Not on file    Food insecurity     Worry: Not on file     Inability: Not on file    Transportation needs     Medical: Not on file     Non-medical: Not on file   Tobacco Use    Smoking status: Never Smoker    Smokeless tobacco: Never Used   Substance and Sexual Activity    Alcohol use: No    Drug use: No    Sexual activity: Not on file   Lifestyle    Physical activity     Days per week: Not on file     Minutes per session: Not on file    Stress: Not on file   Relationships    Social connections     Talks on phone: Not on file     Gets together: Not on file     Attends Buddhist service: Not on file     Active member of club or organization: Not on file     Attends meetings of clubs or organizations: Not on file     Relationship status: Not on file    Intimate partner violence     Fear of current or ex partner: Not on file     Emotionally abused: Not on file     Physically abused: Not on file     Forced sexual activity: Not on file   Other Topics Concern    Not on file   Social History Narrative    Not on file         Review of Systems   Constitutional: Negative for chills, diaphoresis, fatigue and fever. HENT: Negative for congestion, mouth sores, nosebleeds, postnasal drip, rhinorrhea, sneezing, sore throat and voice change. Snoring    Eyes: Negative for itching and visual disturbance. Respiratory: Negative for cough, chest tightness, shortness of breath and wheezing. Cardiovascular: Negative. Negative for chest pain, palpitations and leg swelling. Gastrointestinal: Negative for abdominal pain, diarrhea, nausea and vomiting. Genitourinary: Negative for difficulty urinating and hematuria. Musculoskeletal: Negative for arthralgias, joint swelling and myalgias. Skin: Negative for rash. Allergic/Immunologic: Negative for environmental allergies. Neurological: Negative for dizziness, tremors, weakness and headaches. Psychiatric/Behavioral: Positive for sleep disturbance. Negative for behavioral problems. :     Vitals:    04/26/21 1459   BP: 138/80   Pulse: 68   Temp: 97.5 °F (36.4 °C)   SpO2: 98%   Weight: 280 lb (127 kg)   Height: 5' 11\" (1.803 m)     Wt Readings from Last 3 Encounters:   04/26/21 280 lb (127 kg)   04/21/21 279 lb 1.6 oz (126.6 kg)   11/13/17 290 lb (131.5 kg)         Physical Exam  Constitutional:       Appearance: He is well-developed.  He is obese.   HENT:      Head: Normocephalic and atraumatic. Nose: Nose normal.   Eyes:      Conjunctiva/sclera: Conjunctivae normal.      Pupils: Pupils are equal, round, and reactive to light. Neck:      Thyroid: No thyromegaly. Vascular: No JVD. Trachea: No tracheal deviation. Cardiovascular:      Rate and Rhythm: Normal rate and regular rhythm. Heart sounds: No murmur. No friction rub. No gallop. Pulmonary:      Effort: Pulmonary effort is normal. No respiratory distress. Breath sounds: Normal breath sounds. No wheezing or rales. Chest:      Chest wall: No tenderness. Abdominal:      General: There is no distension. Musculoskeletal: Normal range of motion. Lymphadenopathy:      Cervical: No cervical adenopathy. Skin:     General: Skin is warm and dry. Findings: No rash. Neurological:      Mental Status: He is alert and oriented to person, place, and time. Cranial Nerves: No cranial nerve deficit. Psychiatric:         Behavior: Behavior normal.         Current Outpatient Medications   Medication Sig Dispense Refill    CPAP Machine MISC by Does not apply route New CPAP with 11 cm 1 each 0    rivaroxaban 15 & 20 MG Starter Pack Take as directed on package. 1 Package 0    rivaroxaban (XARELTO) 20 MG TABS tablet Take 1 tablet by mouth daily (with breakfast) 30 tablet 1    famotidine (PEPCID) 20 MG tablet Take 20 mg by mouth 2 times daily as needed      CPAP Machine MISC 11 cm by Does not apply route      ibuprofen (ADVIL;MOTRIN) 200 MG tablet Take 200 mg by mouth every 6 hours as needed for Pain      lisinopril (PRINIVIL;ZESTRIL) 10 MG tablet Take 10 mg by mouth daily      metFORMIN (GLUCOPHAGE) 500 MG tablet Take 500 mg by mouth 2 times daily (with meals)      ranitidine (ZANTAC 150 MAXIMUM STRENGTH) 150 MG tablet Take 150 mg by mouth 2 times daily       No current facility-administered medications for this visit.         No results found for this or any previous visit.]  Results for orders placed during the hospital encounter of 21   XR CHEST PORTABLE    Narrative Exam: XR CHEST PORTABLE    History:  sob     Technique: AP portable view of the chest obtained. Comparison: Chest x-ray from May 21, 2013    Chest x-ray portable   Findings: The cardiomediastinal silhouette is within normal limits. There are no infiltrates, consolidations or effusions. Bones of the thorax appear intact. Impression No radiographic evidence of acute intrathoracic process. Assessment/Plan:     1. ROSA on CPAP  He said his CPAP . He need new CPAP . He had CPAP with 11 cm   He was using CPAP for about  6   hours every night. He was  using CPAP with nasal  Mask. He said  sleep is not   restful without the CPAP use. He was compliant with CPAP therapy and benefiting with CPAP use before it got broke. .C/o  snoring without  CPAP use. Counseling: CPAP/BiPAP uses, He advised to use CPAP at least 5-6 hours every night. Sleep hygiene:Avoid supine sleep, sleep on  sides. Avoid  sleep deprivation. Explained sleep hygiene. Advice to avoid Alcohol and sedative    2. Obesity (BMI 30-39. 9)  He is advised try to lose weight. obesity related risk explained to the patient ,  Current weight:  280 lb (127 kg) Lbs. BMI:  Body mass index is 39.05 kg/m². Suggested weight control approaches, including dietary changes , exercise, behavioral modification. 3. Acute saddle PE s/p thrombectomy  He had PE thrombectomy and he is on Xarelto       Return in about 2 months (around 2021) for rosa.       Francois Amaya MD

## 2021-04-28 LAB — FACTOR V LEIDEN: NEGATIVE

## 2021-04-28 NOTE — PROCEDURES
successful  extrusion of clot from the body. Next, the catheter was then directed  to the left main pulmonary artery as well as segmental branch and  thrombectomy was repeated with successful extrusion of significant  amount of clot from the patient. Repeat PA pressures were obtained. Catheter was removed from the patient and the right common femoral vein  was closed with closure device without any difficulties. The patient  was transferred to the postcath holding area/intensive care unit in a  stable satisfactory condition. FINDINGS:  1. Right lower extremity defending venogram shows a patent right  external and common iliac venous system. 2.  Selective bilateral pulmonary angiogram shows significant thrombus  burden and bilateral main pulmonary arteries as well as subsegmental  branches. ASSESSMENT:  Status post successful bilateral main pulmonary artery as  well as segmental branches pulmonary embolism thrombectomy with Inari  FlowTriever device. PLAN:  1. Postprocedure care as usual.  2.  Monitor carefully in the intensive care unit. 3.  Resume anticoagulation.         Estephanie Fraser DO    D: 04/28/2021 17:41:49       T: 04/28/2021 19:00:58     [email or web address]  Job#: [id number]     Doc#: [id number]    CC:

## 2021-04-29 LAB
PROTHROMBIN G20210A MUTATION: NEGATIVE
PT PCR SPECIMEN: NORMAL

## 2021-06-02 ENCOUNTER — HOSPITAL ENCOUNTER (OUTPATIENT)
Dept: CT IMAGING | Age: 56
Discharge: HOME | End: 2021-06-04
Payer: COMMERCIAL

## 2021-06-02 DIAGNOSIS — R10.9 STOMACH ACHE: ICD-10-CM

## 2021-06-02 PROCEDURE — 74177 CT ABD & PELVIS W/CONTRAST: CPT

## 2021-06-02 PROCEDURE — 6360000004 HC RX CONTRAST MEDICATION: Performed by: INTERNAL MEDICINE

## 2021-06-02 PROCEDURE — 2500000003 HC RX 250 WO HCPCS: Performed by: INTERNAL MEDICINE

## 2021-06-02 RX ORDER — SODIUM CHLORIDE 0.9 % (FLUSH) 0.9 %
10 SYRINGE (ML) INJECTION
Status: DISPENSED | OUTPATIENT
Start: 2021-06-02 | End: 2021-06-02

## 2021-06-02 RX ADMIN — IOPAMIDOL 100 ML: 755 INJECTION, SOLUTION INTRAVENOUS at 15:40

## 2021-06-02 RX ADMIN — BARIUM SULFATE 450 ML: 20 SUSPENSION ORAL at 15:39

## 2021-06-03 ENCOUNTER — OFFICE VISIT (OUTPATIENT)
Dept: CARDIOLOGY CLINIC | Age: 56
End: 2021-06-03
Payer: COMMERCIAL

## 2021-06-03 VITALS
DIASTOLIC BLOOD PRESSURE: 86 MMHG | OXYGEN SATURATION: 96 % | BODY MASS INDEX: 39.86 KG/M2 | WEIGHT: 285.8 LBS | SYSTOLIC BLOOD PRESSURE: 136 MMHG | HEART RATE: 95 BPM

## 2021-06-03 DIAGNOSIS — Z86.711 HISTORY OF PULMONARY EMBOLISM: ICD-10-CM

## 2021-06-03 DIAGNOSIS — Z99.89 OSA ON CPAP: Primary | ICD-10-CM

## 2021-06-03 DIAGNOSIS — Z86.718 HISTORY OF DVT (DEEP VEIN THROMBOSIS): ICD-10-CM

## 2021-06-03 DIAGNOSIS — G47.33 OSA ON CPAP: Primary | ICD-10-CM

## 2021-06-03 PROCEDURE — G8427 DOCREV CUR MEDS BY ELIG CLIN: HCPCS | Performed by: INTERNAL MEDICINE

## 2021-06-03 PROCEDURE — 1036F TOBACCO NON-USER: CPT | Performed by: INTERNAL MEDICINE

## 2021-06-03 PROCEDURE — 3017F COLORECTAL CA SCREEN DOC REV: CPT | Performed by: INTERNAL MEDICINE

## 2021-06-03 PROCEDURE — G8417 CALC BMI ABV UP PARAM F/U: HCPCS | Performed by: INTERNAL MEDICINE

## 2021-06-03 PROCEDURE — 99214 OFFICE O/P EST MOD 30 MIN: CPT | Performed by: INTERNAL MEDICINE

## 2021-06-03 NOTE — PROGRESS NOTES
Chief Complaint   Patient presents with    Follow-Up from Hospital    Discuss Medications     STOPPED [address] [place]          4/21/2021:    Patient is a 60 y.o. male who presents with a chief complaint of shortness of breath. Patient is followed on a regular basis by Dr. Mick Gibson MD.  Patient presents with 5 days onset of worsening shortness of breath. Denies any history of myocardial infarction, congestive heart failure or arrhythmia. Work-up in the emergency department revealed a saddle pulmonary embolism/massive central pulmonary embolism. Patient's blood pressure on presentation was 155/108 with a heart rate of 108 at rest respirations to 37, 95% saturation on 4 L nasal cannula. He denies any recent travel. Denies any history of cancer. Denies any recent lower extremity trauma. Denies any tobacco abuse. Denies any previous history of thromboembolic disease. Denies family history of thromboembolic disease. He admits to history of diabetes, hypertension and hyperlipidemia. Noted to have mildly elevated cardiac enzyme. 6/3/2021: Patient presents for initial medical evaluation. Patient is followed on a regular basis by Dr. Sveta Menjivar primary care provider on file. .  Status post hospitalization secondary to saddle pulmonary embolism status post PE thrombectomy. He is walking and his shortness of breath is significant improved and breathing well. States that he is having some constipation and may be related to Xarelto. He has felt better over the last couple days after stopping Xarelto for 2 days on his own. Patient with history of diabetes, hypertension hyperlipidemia. Status post bilateral extremity venous duplex ultrasound on 4/21/2021 with DVT in the right lower extremity in the distal right femoral vein and right popliteal vein and peroneal veins.            Patient Active Problem List   Diagnosis    ROSA on CPAP    Acute saddle pulmonary embolism with acute cor pulmonale (HCC)    History of pulmonary embolism    History of DVT (deep vein thrombosis)       No past surgical history on file. Social History     Socioeconomic History    Marital status: Single     Spouse name: Not on file    Number of children: Not on file    Years of education: Not on file    Highest education level: Not on file   Occupational History    Not on file   Tobacco Use    Smoking status: Never Smoker    Smokeless tobacco: Never Used   Vaping Use    Vaping Use: Never used   Substance and Sexual Activity    Alcohol use: No    Drug use: No    Sexual activity: Not on file   Other Topics Concern    Not on file   Social History Narrative    Not on file     Social Determinants of Health     Financial Resource Strain:     Difficulty of Paying Living Expenses:    Food Insecurity:     Worried About Running Out of Food in the Last Year:     [address] in the Last Year:    Transportation Needs:     Lack of Transportation (Medical):      Lack of Transportation (Non-Medical):    Physical Activity:     Days of Exercise per Week:     Minutes of Exercise per Session:    Stress:     Feeling of Stress :    Social Connections:     Frequency of Communication with Friends and Family:     Frequency of Social Gatherings with Friends and Family:     Attends Ca[employer] Services:     Active Member of Clubs or Organizations:     Attends Club or Organization Meetings:     Marital Status:    Intimate Partner Violence:     Fear of Current or Ex-Partner:     Emotionally Abused:     Physically Abused:     Sexually Abused:        Family History   Problem Relation Age of Onset    Asthma Mother     Heart Disease Father        Current Outpatient Medications   Medication Sig Dispense Refill    CPAP Machine MISC by Does not apply route New CPAP with 11 cm 1 each 0    famotidine (PEPCID) 20 MG tablet Take 20 mg by mouth 2 times daily as needed      CPAP Machine MISC 11 cm by Does not apply route suspicious skin lesions noted        No orders of the defined types were placed in this encounter. ASSESSMENT:     Diagnosis Orders   1. ROSA on CPAP     2. History of pulmonary embolism     3. History of DVT (deep vein thrombosis)           PLAN:         As always, aggressive risk factor modification is strongly recommended. We should adhere to the JNC VIII guidelines for HTN management and the NCEP ATP III guidelines for LDL-C management. Cardiac diet is always recommended with low fat, cholesterol, calories and sodium. Continue medications at current doses. Change xarelto to Eliuiqs 5mg BID, for life    Eventually coronary evaluation given risk factor    Patient was advised and encouraged to check blood pressure at home or at a pharmacy, maintain a logbook, and also call us back if blood pressure are above the target ranges or if it is low. Patient clearly understands and agrees to the instructions. We will need to continue to monitor muscle and liver enzymes, BUN, CR, and electrolytes.

## 2021-06-30 ENCOUNTER — OFFICE VISIT (OUTPATIENT)
Dept: PULMONOLOGY | Age: 56
End: 2021-06-30
Payer: COMMERCIAL

## 2021-06-30 VITALS
WEIGHT: 288.2 LBS | TEMPERATURE: 96 F | HEIGHT: 71 IN | OXYGEN SATURATION: 98 % | SYSTOLIC BLOOD PRESSURE: 142 MMHG | BODY MASS INDEX: 40.35 KG/M2 | HEART RATE: 80 BPM | DIASTOLIC BLOOD PRESSURE: 86 MMHG

## 2021-06-30 DIAGNOSIS — I26.02 ACUTE SADDLE PULMONARY EMBOLISM WITH ACUTE COR PULMONALE (HCC): ICD-10-CM

## 2021-06-30 DIAGNOSIS — E66.9 OBESITY (BMI 30-39.9): ICD-10-CM

## 2021-06-30 DIAGNOSIS — G47.33 OSA ON CPAP: Primary | ICD-10-CM

## 2021-06-30 DIAGNOSIS — Z99.89 OSA ON CPAP: Primary | ICD-10-CM

## 2021-06-30 PROCEDURE — 3017F COLORECTAL CA SCREEN DOC REV: CPT | Performed by: INTERNAL MEDICINE

## 2021-06-30 PROCEDURE — G8427 DOCREV CUR MEDS BY ELIG CLIN: HCPCS | Performed by: INTERNAL MEDICINE

## 2021-06-30 PROCEDURE — 1036F TOBACCO NON-USER: CPT | Performed by: INTERNAL MEDICINE

## 2021-06-30 PROCEDURE — G8417 CALC BMI ABV UP PARAM F/U: HCPCS | Performed by: INTERNAL MEDICINE

## 2021-06-30 PROCEDURE — 99214 OFFICE O/P EST MOD 30 MIN: CPT | Performed by: INTERNAL MEDICINE

## 2021-06-30 ASSESSMENT — ENCOUNTER SYMPTOMS
NAUSEA: 0
ABDOMINAL PAIN: 0
CHEST TIGHTNESS: 0
WHEEZING: 0
COUGH: 0
DIARRHEA: 0
VOMITING: 0
EYE ITCHING: 0
VOICE CHANGE: 0
SORE THROAT: 0
RHINORRHEA: 0
SHORTNESS OF BREATH: 0

## 2021-06-30 NOTE — PROGRESS NOTES
Subjective:     Amber Gutierrez is a 60 y.o. male who complains today of:     Chief Complaint   Patient presents with    Sleep Apnea     2 month f/u     HPI  He is using CPAP with  11  centimeters of H2O with heated humidity. He is using CPAP for about 8 hours every night. He is using CPAP with nasal  Mask. He said  sleep is restful with the CPAP use. He is compliant with CPAP therapy and benefiting with CPAP use. No snoring with CPAP use. No complaint of daytime sleepiness or tiredness with CPAP use. He denies taking naps. No sleepiness with driving. He denies difficulty falling asleep or staying asleep. I reviewed compliance report with patient regarding CPAP therapy. He is using  CPAP for days out of 30 days  Average usage of days used is 8 hours and 15 min , average AHI 3.2  with CPAP use. He had a PE thrombectomy done and significant amount of blood clot was pulled out. He also had DVT rt. Leg . He is doing much better. He is a on room air and his O2 saturation is 98%. he is on Eliquis. Allergies:  Quinolones  Past Medical History:   Diagnosis Date    Chronic back pain     Diabetes ([address]     GERD (gastroesophageal reflux disease)     High cholesterol     History of DVT (deep vein thrombosis) 6/3/2021    History of pulmonary embolism 6/3/2021    Hypertension     IBS (irritable bowel syndrome)     Kidney, malrotation     Sleep apnea      No past surgical history on file.   Family History   Problem Relation Age of Onset    Asthma Mother     Heart Disease Father      Social History     Socioeconomic History    Marital status: Single     Spouse name: Not on file    Number of children: Not on file    Years of education: Not on file    Highest education level: Not on file   Occupational History    Not on file   Tobacco Use    Smoking status: Never Smoker    Smokeless tobacco: Never Used   Vaping Use    Vaping Use: Never used   Substance and Sexual Activity    Alcohol use: No    Drug use: No    Sexual activity: Not on file   Other Topics Concern    Not on file   Social History Narrative    Not on file     Social Determinants of Health     Financial Resource Strain:     Difficulty of Paying Living Expenses:    Food Insecurity:     Worried About Running Out of Food in the Last Year:     [address] in the Last Year:    Transportation Needs:     Lack of Transportation (Medical):  Lack of Transportation (Non-Medical):    Physical Activity:     Days of Exercise per Week:     Minutes of Exercise per Session:    Stress:     Feeling of Stress :    Social Connections:     Frequency of Communication with Friends and Family:     Frequency of Social Gatherings with Friends and Family:     Attends [employer] Services:     Active Member of Clubs or Organizations:     Attends Club or Organization Meetings:     Marital Status:    Intimate Partner Violence:     Fear of Current or Ex-Partner:     Emotionally Abused:     Physically Abused:     Sexually Abused:          Review of Systems   Constitutional: Negative for chills, diaphoresis, fatigue and fever. HENT: Negative for congestion, mouth sores, nosebleeds, postnasal drip, rhinorrhea, sneezing, sore throat and voice change. Eyes: Negative for itching and visual disturbance. Respiratory: Negative for cough, chest tightness, shortness of breath and wheezing. Cardiovascular: Negative. Negative for chest pain, palpitations and leg swelling. Gastrointestinal: Negative for abdominal pain, diarrhea, nausea and vomiting. Genitourinary: Negative for difficulty urinating and hematuria. Musculoskeletal: Negative for arthralgias, joint swelling and myalgias. Skin: Negative for rash. Allergic/Immunologic: Negative for environmental allergies. Neurological: Negative for dizziness, tremors, weakness and headaches. Psychiatric/Behavioral: Positive for sleep disturbance. Negative for behavioral problems. :     Vitals:    06/30/21 1324 06/30/21 1327   BP: (!) 145/85 (!) 142/86   Site: Right Lower Arm Left Lower Arm   Position: Sitting Sitting   Cuff Size: Large Adult Large Adult   Pulse: 80    Temp: 96 °F (35.6 °C)    SpO2: 98%    Weight: 288 lb 3.2 oz (130.7 kg)    Height: 5' 11\" (1.803 m)      Wt Readings from Last 3 Encounters:   06/30/21 288 lb 3.2 oz (130.7 kg)   06/03/21 285 lb 12.8 oz (129.6 kg)   04/26/21 280 lb (127 kg)         Physical Exam  Constitutional:       Appearance: He is well-developed. He is obese. HENT:      Head: Normocephalic and atraumatic. Nose: Nose normal.   Eyes:      Conjunctiva/sclera: Conjunctivae normal.      Pupils: Pupils are equal, round, and reactive to light. Neck:      Thyroid: No thyromegaly. Vascular: No JVD. Trachea: No tracheal deviation. Cardiovascular:      Rate and Rhythm: Normal rate and regular rhythm. Heart sounds: No murmur heard. No friction rub. No gallop. Pulmonary:      Effort: Pulmonary effort is normal. No respiratory distress. Breath sounds: Normal breath sounds. No wheezing or rales. Chest:      Chest wall: No tenderness. Abdominal:      General: There is no distension. Musculoskeletal:         General: Normal range of motion. Lymphadenopathy:      Cervical: No cervical adenopathy. Skin:     General: Skin is warm and dry. Findings: No rash. Neurological:      Mental Status: He is alert and oriented to person, place, and time. Cranial Nerves: No cranial nerve deficit.    Psychiatric:         Behavior: Behavior normal.         Current Outpatient Medications   Medication Sig Dispense Refill    apixaban (ELIQUIS) 5 MG TABS tablet Take 1 tablet by mouth 2 times daily 60 tablet 5    CPAP Machine MISC by Does not apply route New CPAP with 11 cm 1 each 0    famotidine (PEPCID) 20 MG tablet Take 20 mg by mouth 2 times daily as needed      CPAP Machine MISC 11 cm by Does not apply route      ibuprofen (ADVIL;MOTRIN) 200 MG tablet Take 200 mg by mouth every 6 hours as needed for Pain      lisinopril (PRINIVIL;ZESTRIL) 10 MG tablet Take 10 mg by mouth daily      metFORMIN (GLUCOPHAGE) 500 MG tablet Take 500 mg by mouth 2 times daily (with meals)      ranitidine (ZANTAC 150 MAXIMUM STRENGTH) 150 MG tablet Take 150 mg by mouth 2 times daily       No current facility-administered medications for this visit.     ]  Results for orders placed during the hospital encounter of 04/21/21    XR CHEST PORTABLE    Narrative  Exam: XR CHEST PORTABLE    History:  sob    Technique: AP portable view of the chest obtained. Comparison: Chest x-ray from May 21, 2013    Chest x-ray portable  Findings: The cardiomediastinal silhouette is within normal limits. There are no infiltrates, consolidations or effusions. Bones of the thorax appear intact. Impression  No radiographic evidence of acute intrathoracic process. Assessment/Plan:     1. ROSA on CPAP  He is using CPAP with  11  centimeters of H2O with heated humidity. He is using CPAP for about 8 hours every night. He is using CPAP with nasal  Mask. He said  sleep is restful with the CPAP use. He is compliant with CPAP therapy and benefiting with CPAP use. No snoring with CPAP use. continue CPAP as before. I reviewed compliance report with patient regarding CPAP therapy. He is using  CPAP for days out of 30 days  Average usage of days used is 8 hours and 15 min , average AHI 3.2  with CPAP use. Counseling: CPAP/BiPAP uses, He advised to use CPAP at least 5-6 hours every night. Driving: He is advised for extreme caution when driving or operating machinery if there is a feeling of drowsiness, especially while driving it is preferable to stop driving and take a brief nap. Sleep hygiene:Avoid supine sleep, sleep on  sides. Avoid  sleep deprivation. Explained sleep hygiene. Advice to avoid Alcohol and sedative    Time spend over 30 min. Face to face. with greater than 50 % time with counseling regarding CPAP therapy. 2. Obesity (BMI 30-39. 9)  He is advised try to lose weight. obesity related risk explained to the patient ,  Current weight:  288 lb 3.2 oz (130.7 kg) Lbs. BMI:  Body mass index is 40.2 kg/m². Suggested weight control approaches, including dietary changes , exercise, behavioral modification. 3. Acute saddle pulmonary embolism with acute cor pulmonale (HCC) s/p thrombectomy  He had thrombectomy done  by dr. Yariel Bland and he is doing well. He also has DVT rt. Leg . He is on Eliquis      Return in about 4 months (around 10/30/2021) for jose, pulmonary embolism.       Cyndy Hurst MD

## 2021-10-25 NOTE — TELEPHONE ENCOUNTER
Please approve or deny this refill request. The order is pended. Thank you.     LOV 6/3/2021    Next Visit Date:  Future Appointments   Date Time Provider Tamera Brown   11/4/2021  1:00 PM Bg Kumar MD [address] [place] [address]

## 2021-10-26 RX ORDER — APIXABAN 5 MG/1
TABLET, FILM COATED ORAL
Qty: 60 TABLET | Refills: 5 | Status: SHIPPED | OUTPATIENT
Start: 2021-10-26 | End: 2021-11-03 | Stop reason: SDUPTHER

## 2021-11-03 NOTE — TELEPHONE ENCOUNTER
Please approve or deny this refill request. The order is pended. Thank you.     LOV 6/3/2021    Next Visit Date:  Future Appointments   Date Time Provider Tamera Stephanie   11/4/2021  1:00 PM Bg Kumar MD [address]   12/3/2021 12:15 PM DO Yoana Abebe       VERBAL ORDER RECEIVED WITH READ BACK

## 2021-11-04 ENCOUNTER — OFFICE VISIT (OUTPATIENT)
Dept: PULMONOLOGY | Age: 56
End: 2021-11-04
Payer: COMMERCIAL

## 2021-11-04 VITALS
BODY MASS INDEX: 40.04 KG/M2 | HEIGHT: 71 IN | OXYGEN SATURATION: 98 % | DIASTOLIC BLOOD PRESSURE: 85 MMHG | HEART RATE: 86 BPM | WEIGHT: 286 LBS | TEMPERATURE: 98.2 F | SYSTOLIC BLOOD PRESSURE: 139 MMHG

## 2021-11-04 DIAGNOSIS — I82.401 DEEP VEIN THROMBOSIS (DVT) OF RIGHT LOWER EXTREMITY, UNSPECIFIED CHRONICITY, UNSPECIFIED VEIN (HCC): ICD-10-CM

## 2021-11-04 DIAGNOSIS — Z99.89 OSA ON CPAP: Primary | ICD-10-CM

## 2021-11-04 DIAGNOSIS — G47.33 OSA ON CPAP: Primary | ICD-10-CM

## 2021-11-04 DIAGNOSIS — E66.9 OBESITY (BMI 30-39.9): ICD-10-CM

## 2021-11-04 DIAGNOSIS — I26.02 ACUTE SADDLE PULMONARY EMBOLISM WITH ACUTE COR PULMONALE (HCC): ICD-10-CM

## 2021-11-04 PROCEDURE — G8484 FLU IMMUNIZE NO ADMIN: HCPCS | Performed by: INTERNAL MEDICINE

## 2021-11-04 PROCEDURE — 1036F TOBACCO NON-USER: CPT | Performed by: INTERNAL MEDICINE

## 2021-11-04 PROCEDURE — 3017F COLORECTAL CA SCREEN DOC REV: CPT | Performed by: INTERNAL MEDICINE

## 2021-11-04 PROCEDURE — G8427 DOCREV CUR MEDS BY ELIG CLIN: HCPCS | Performed by: INTERNAL MEDICINE

## 2021-11-04 PROCEDURE — G8417 CALC BMI ABV UP PARAM F/U: HCPCS | Performed by: INTERNAL MEDICINE

## 2021-11-04 PROCEDURE — 99214 OFFICE O/P EST MOD 30 MIN: CPT | Performed by: INTERNAL MEDICINE

## 2021-11-04 ASSESSMENT — ENCOUNTER SYMPTOMS
NAUSEA: 0
VOICE CHANGE: 0
RHINORRHEA: 0
DIARRHEA: 0
ABDOMINAL PAIN: 0
SHORTNESS OF BREATH: 0
COUGH: 0
VOMITING: 0
EYE ITCHING: 0
WHEEZING: 0
CHEST TIGHTNESS: 0
SORE THROAT: 0

## 2021-11-04 NOTE — PROGRESS NOTES
Subjective:     Helena Hopson is a 60 y.o. male who complains today of:     Chief Complaint   Patient presents with    Sleep Apnea     4 month f/u       HPI  He is using CPAP with  11  centimeters of H2O with heated humidity. He is using CPAP for about  6  hours every night. He is using CPAP with Nasal   Mask. He said  sleep is restful with the CPAP use. He is compliant with CPAP therapy and benefiting with CPAP use. No snoring with CPAP use. No complaint of daytime sleepiness or tiredness with CPAP use. He denies taking naps. No sleepiness with driving. He denies difficulty falling asleep or staying asleep. I reviewed compliance report with patient regarding CPAP therapy. He is using  CPAP for 30 days out of 30 days  Average usage of days used is 8 hours and 20 min , average AHI 3.3 with CPAP use. He had a PE thrombectomy done and significant amount of blood clot was pulled out. He also had DVT rt. Leg . he is on Eliquis. Allergies:  Quinolones  Past Medical History:   Diagnosis Date    Chronic back pain     Diabetes ([address]     GERD (gastroesophageal reflux disease)     High cholesterol     History of DVT (deep vein thrombosis) 6/3/2021    History of pulmonary embolism 6/3/2021    Hypertension     IBS (irritable bowel syndrome)     Kidney, malrotation     Sleep apnea      No past surgical history on file.   Family History   Problem Relation Age of Onset    Asthma Mother     Heart Disease Father      Social History     Socioeconomic History    Marital status: Single     Spouse name: Not on file    Number of children: Not on file    Years of education: Not on file    Highest education level: Not on file   Occupational History    Not on file   Tobacco Use    Smoking status: Never Smoker    Smokeless tobacco: Never Used   Vaping Use    Vaping Use: Never used   Substance and Sexual Activity    Alcohol use: No    Drug use: No    Sexual activity: Not on file   Other Topics Concern    Not on file   Social History Narrative    Not on file     Social Determinants of Health     Financial Resource Strain:     Difficulty of Paying Living Expenses:    Food Insecurity:     Worried About Running Out of Food in the Last Year:     [address] in the Last Year:    Transportation Needs:     Lack of Transportation (Medical):  Lack of Transportation (Non-Medical):    Physical Activity:     Days of Exercise per Week:     Minutes of Exercise per Session:    Stress:     Feeling of Stress :    Social Connections:     Frequency of Communication with Friends and Family:     Frequency of Social Gatherings with Friends and Family:     Attends [employer] Services:     Active Member of Clubs or Organizations:     Attends Club or Organization Meetings:     Marital Status:    Intimate Partner Violence:     Fear of Current or Ex-Partner:     Emotionally Abused:     Physically Abused:     Sexually Abused:          Review of Systems   Constitutional: Negative for chills, diaphoresis, fatigue and fever. HENT: Negative for congestion, mouth sores, nosebleeds, postnasal drip, rhinorrhea, sneezing, sore throat and voice change. Eyes: Negative for itching and visual disturbance. Respiratory: Negative for cough, chest tightness, shortness of breath and wheezing. Cardiovascular: Negative. Negative for chest pain, palpitations and leg swelling. Gastrointestinal: Negative for abdominal pain, diarrhea, nausea and vomiting. Genitourinary: Negative for difficulty urinating and hematuria. Musculoskeletal: Negative for arthralgias, joint swelling and myalgias. Skin: Negative for rash. Allergic/Immunologic: Negative for environmental allergies. Neurological: Negative for dizziness, tremors, weakness and headaches. Psychiatric/Behavioral: Positive for sleep disturbance. Negative for behavioral problems.          :     Vitals:    11/04/21 1253   BP: 139/85   Pulse: 86   Temp: 98.2 °F (36.8 °C)   SpO2: 98%   Weight: 286 lb (129.7 kg)   Height: 5' 11\" (1.803 m)     Wt Readings from Last 3 Encounters:   11/04/21 286 lb (129.7 kg)   06/30/21 288 lb 3.2 oz (130.7 kg)   06/03/21 285 lb 12.8 oz (129.6 kg)         Physical Exam  Constitutional:       Appearance: He is well-developed. He is obese. HENT:      Head: Normocephalic and atraumatic. Nose: Nose normal.   Eyes:      Conjunctiva/sclera: Conjunctivae normal.      Pupils: Pupils are equal, round, and reactive to light. Neck:      Thyroid: No thyromegaly. Vascular: No JVD. Trachea: No tracheal deviation. Cardiovascular:      Rate and Rhythm: Normal rate and regular rhythm. Heart sounds: No murmur heard. No friction rub. No gallop. Pulmonary:      Effort: Pulmonary effort is normal. No respiratory distress. Breath sounds: Normal breath sounds. No wheezing or rales. Chest:      Chest wall: No tenderness. Abdominal:      General: There is no distension. Musculoskeletal:         General: Normal range of motion. Lymphadenopathy:      Cervical: No cervical adenopathy. Skin:     General: Skin is warm and dry. Findings: No rash. Neurological:      Mental Status: He is alert and oriented to person, place, and time. Cranial Nerves: No cranial nerve deficit. Psychiatric:         Behavior: Behavior normal.         Current Outpatient Medications   Medication Sig Dispense Refill    apixaban (ELIQUIS) 5 MG TABS tablet TAKE 1 TABLET BY MOUTH 2 TIMES DAILY 60 tablet 5    Respiratory Therapy Supplies ROSALIND New Full face CPAP mask and supplies.  1 Device 0    CPAP Machine MISC by Does not apply route New CPAP with 11 cm 1 each 0    famotidine (PEPCID) 20 MG tablet Take 20 mg by mouth 2 times daily as needed      CPAP Machine MISC 11 cm by Does not apply route      ibuprofen (ADVIL;MOTRIN) 200 MG tablet Take 200 mg by mouth every 6 hours as needed for Pain      lisinopril (PRINIVIL;ZESTRIL) 10 MG tablet Take 10 mg by mouth daily      metFORMIN (GLUCOPHAGE) 500 MG tablet Take 500 mg by mouth 2 times daily (with meals)      ranitidine (ZANTAC 150 MAXIMUM STRENGTH) 150 MG tablet Take 150 mg by mouth 2 times daily       No current facility-administered medications for this visit. No results found for this or any previous visit.  ]  Results for orders placed during the hospital encounter of 04/21/21    XR CHEST PORTABLE    Narrative  Exam: XR CHEST PORTABLE    History:  sob    Technique: AP portable view of the chest obtained. Comparison: Chest x-ray from May 21, 2013    Chest x-ray portable  Findings: The cardiomediastinal silhouette is within normal limits. There are no infiltrates, consolidations or effusions. Bones of the thorax appear intact. Impression  No radiographic evidence of acute intrathoracic process. Assessment/Plan:     1. ROSA on CPAP  He is using CPAP with  11  centimeters of H2O with heated humidity. He is using CPAP for about  6  hours every night. He is using CPAP with Nasal   Mask. He said  sleep is restful with the CPAP use. He is compliant with CPAP therapy and benefiting with CPAP use. No snoring with CPAP use. I reviewed compliance report with patient regarding CPAP therapy. He is using  CPAP for 30 days out of 30 days  Average usage of days used is 8 hours and 20 min , average AHI 3.3 with CPAP use. Counseling: CPAP/BiPAP uses, He advised to use CPAP at least 5-6 hours every night. Driving: He is advised for extreme caution when driving or operating machinery if there is a feeling of drowsiness, especially while driving it is preferable to stop driving and take a brief nap. Sleep hygiene:Avoid supine sleep, sleep on  sides. Avoid  sleep deprivation. Explained sleep hygiene. Advice to avoid Alcohol and sedative    2. Obesity (BMI 30-39. 9)  He is advised try to lose weight.  obesity related risk explained to the patient ,  Current weight:  286 lb (129.7 kg) Lbs. BMI:  Body mass index is 39.89 kg/m². Suggested weight control approaches, including dietary changes , exercise, behavioral modification. 3. Acute saddle pulmonary embolism with acute cor pulmonale (HCC) s/p thrombectomy  He had a PE thrombectomy done and significant amount of blood clot was pulled out. He also had DVT rt. Leg . he is on Eliquis. 4. Deep vein thrombosis (DVT) of right lower extremity, unspecified chronicity, unspecified vein ([facility]  He is on Eliquis      Return in about 4 months (around 3/4/2022) for jose, pulmonary embolism.       Reema Garland MD

## 2021-12-03 ENCOUNTER — OFFICE VISIT (OUTPATIENT)
Dept: CARDIOLOGY CLINIC | Age: 56
End: 2021-12-03
Payer: COMMERCIAL

## 2021-12-03 VITALS
SYSTOLIC BLOOD PRESSURE: 130 MMHG | WEIGHT: 281 LBS | DIASTOLIC BLOOD PRESSURE: 80 MMHG | HEART RATE: 77 BPM | BODY MASS INDEX: 39.19 KG/M2

## 2021-12-03 DIAGNOSIS — G47.33 OSA ON CPAP: ICD-10-CM

## 2021-12-03 DIAGNOSIS — R94.31 ABNORMAL EKG: ICD-10-CM

## 2021-12-03 DIAGNOSIS — Z00.00 PE (PHYSICAL EXAM), ROUTINE: Primary | ICD-10-CM

## 2021-12-03 DIAGNOSIS — Z86.718 HISTORY OF DVT (DEEP VEIN THROMBOSIS): ICD-10-CM

## 2021-12-03 DIAGNOSIS — E66.9 OBESITY (BMI 30-39.9): ICD-10-CM

## 2021-12-03 DIAGNOSIS — Z99.89 OSA ON CPAP: ICD-10-CM

## 2021-12-03 DIAGNOSIS — Z86.711 HISTORY OF PULMONARY EMBOLISM: ICD-10-CM

## 2021-12-03 PROCEDURE — 99214 OFFICE O/P EST MOD 30 MIN: CPT | Performed by: INTERNAL MEDICINE

## 2021-12-03 PROCEDURE — 1036F TOBACCO NON-USER: CPT | Performed by: INTERNAL MEDICINE

## 2021-12-03 PROCEDURE — G8484 FLU IMMUNIZE NO ADMIN: HCPCS | Performed by: INTERNAL MEDICINE

## 2021-12-03 PROCEDURE — G8417 CALC BMI ABV UP PARAM F/U: HCPCS | Performed by: INTERNAL MEDICINE

## 2021-12-03 PROCEDURE — 93000 ELECTROCARDIOGRAM COMPLETE: CPT | Performed by: INTERNAL MEDICINE

## 2021-12-03 PROCEDURE — 3017F COLORECTAL CA SCREEN DOC REV: CPT | Performed by: INTERNAL MEDICINE

## 2021-12-03 PROCEDURE — G8427 DOCREV CUR MEDS BY ELIG CLIN: HCPCS | Performed by: INTERNAL MEDICINE

## 2021-12-03 NOTE — PROGRESS NOTES
Chief Complaint   Patient presents with    3 Month Follow-Up         4/21/2021:    Patient is a 60 y.o. male who presents with a chief complaint of shortness of breath. Patient is followed on a regular basis by Dr. Steffanie Beverly MD.  Patient presents with 5 days onset of worsening shortness of breath. Denies any history of myocardial infarction, congestive heart failure or arrhythmia. Work-up in the emergency department revealed a saddle pulmonary embolism/massive central pulmonary embolism. Patient's blood pressure on presentation was 155/108 with a heart rate of 108 at rest respirations to 37, 95% saturation on 4 L nasal cannula. He denies any recent travel. Denies any history of cancer. Denies any recent lower extremity trauma. Denies any tobacco abuse. Denies any previous history of thromboembolic disease. Denies family history of thromboembolic disease. He admits to history of diabetes, hypertension and hyperlipidemia. Noted to have mildly elevated cardiac enzyme. 6/3/2021: Patient presents for initial medical evaluation. Patient is followed on a regular basis by Dr. Steffanie Beverly MD.  Status post hospitalization secondary to saddle pulmonary embolism status post PE thrombectomy. He is walking and his shortness of breath is significant improved and breathing well. States that he is having some constipation and may be related to Xarelto. He has felt better over the last couple days after stopping Xarelto for 2 days on his own. Patient with history of diabetes, hypertension hyperlipidemia. Status post bilateral extremity venous duplex ultrasound on 4/21/2021 with DVT in the right lower extremity in the distal right femoral vein and right popliteal vein and peroneal veins. 12-3-21: hx of saddle PE, on Eliquis. No angina symptoms. Patient with history of diabetes, hypertension hyperlipidemia.   Status post bilateral extremity venous duplex ultrasound on 4/21/2021 with DVT in the right lower extremity in the distal right femoral vein and right popliteal vein and peroneal veins. EKG with ? Old anterior wall MI      Patient Active Problem List   Diagnosis    ROSA on CPAP    Acute saddle pulmonary embolism with acute cor pulmonale (HCC)    History of pulmonary embolism    History of DVT (deep vein thrombosis)    Deep vein thrombosis (DVT) of right lower extremity (HCC)    Obesity (BMI 30-39. 9)    Abnormal EKG       No past surgical history on file. Social History     Socioeconomic History    Marital status: Single     Spouse name: Not on file    Number of children: Not on file    Years of education: Not on file    Highest education level: Not on file   Occupational History    Not on file   Tobacco Use    Smoking status: Never Smoker    Smokeless tobacco: Never Used   Vaping Use    Vaping Use: Never used   Substance and Sexual Activity    Alcohol use: No    Drug use: No    Sexual activity: Not on file   Other Topics Concern    Not on file   Social History Narrative    Not on file     Social Determinants of Health     Financial Resource Strain:     Difficulty of Paying Living Expenses: Not on file   Food Insecurity:     Worried About Running Out of Food in the Last Year: Not on file    David of Food in the Last Year: Not on file   Transportation Needs:     Lack of Transportation (Medical): Not on file    Lack of Transportation (Non-Medical):  Not on file   Physical Activity:     Days of Exercise per Week: Not on file    Minutes of Exercise per Session: Not on file   Stress:     Feeling of Stress : Not on file   Social Connections:     Frequency of Communication with Friends and Family: Not on file    Frequency of Social Gatherings with Friends and Family: Not on file    Attends Confucianist Services: Not on file    Active Member of Clubs or Organizations: Not on file    Attends Club or Organization Meetings: Not on file    Marital Status: Not on file   Intimate Partner Violence:     Fear of Current or Ex-Partner: Not on file    Emotionally Abused: Not on file    Physically Abused: Not on file    Sexually Abused: Not on file   Housing Stability:     Unable to Pay for Housing in the Last Year: Not on file    Number of [place] in the Last Year: Not on file    Unstable Housing in the Last Year: Not on file       Family History   Problem Relation Age of Onset    Asthma Mother     Heart Disease Father        Current Outpatient Medications   Medication Sig Dispense Refill    apixaban (ELIQUIS) 5 MG TABS tablet TAKE 1 TABLET BY MOUTH 2 TIMES DAILY 60 tablet 5    Respiratory Therapy Supplies ROSALIND New Full face CPAP mask and supplies. 1 Device 0    CPAP Machine MISC by Does not apply route New CPAP with 11 cm 1 each 0    famotidine (PEPCID) 20 MG tablet Take 20 mg by mouth 2 times daily as needed      CPAP Machine MISC 11 cm by Does not apply route      ibuprofen (ADVIL;MOTRIN) 200 MG tablet Take 200 mg by mouth every 6 hours as needed for Pain      lisinopril (PRINIVIL;ZESTRIL) 10 MG tablet Take 10 mg by mouth daily      metFORMIN (GLUCOPHAGE) 500 MG tablet Take 500 mg by mouth 2 times daily (with meals)      ranitidine (ZANTAC 150 MAXIMUM STRENGTH) 150 MG tablet Take 150 mg by mouth 2 times daily       No current facility-administered medications for this visit. Quinolones    Review of Systems:  General ROS: negative  Psychological ROS: negative  Hematological and Lymphatic ROS: No history of blood clots or bleeding disorder.    Respiratory ROS: no cough, shortness of breath, or wheezing  Cardiovascular ROS: no chest pain or dyspnea on exertion  Gastrointestinal ROS: no abdominal pain, change in bowel habits, or black or bloody stools  Genito-Urinary ROS: no dysuria, trouble voiding, or hematuria  Musculoskeletal ROS: negative  Neurological ROS: no TIA or stroke symptoms  Dermatological ROS: negative    VITALS:  Blood pressure 130/80, pulse 77, weight 281 lb (127.5 kg). Body mass index is 39.19 kg/m². Physical Examination:  General appearance - alert, well appearing, and in no distress  Mental status - alert, oriented to person, place, and time  Neck - Neck is supple, no JVD or carotid bruits. No thyromegaly or adenopathy. Chest - clear to auscultation, no wheezes, rales or rhonchi, symmetric air entry  Heart - normal rate, regular rhythm, normal S1, S2, no murmurs, rubs, clicks or gallops  Abdomen - soft, nontender, nondistended, no masses or organomegaly  Neurological - alert, oriented, normal speech, no focal findings or movement disorder noted  Extremities - peripheral pulses normal, no pedal edema, no clubbing or cyanosis  Skin - normal coloration and turgor, no rashes, no suspicious skin lesions noted        Orders Placed This Encounter   Procedures    NM MYOCARDIAL SPECT REST EXERCISE OR RX    EKG 12 Lead       ASSESSMENT:     Diagnosis Orders   1. PE (physical exam), routine  EKG 12 Lead   2. Abnormal EKG  NM MYOCARDIAL SPECT REST EXERCISE OR RX   3. History of DVT (deep vein thrombosis)     4. History of pulmonary embolism     5. Obesity (BMI 30-39.9)     6. ROSA on CPAP           PLAN:         As always, aggressive risk factor modification is strongly recommended. We should adhere to the JNC VIII guidelines for HTN management and the NCEP ATP III guidelines for LDL-C management. Cardiac diet is always recommended with low fat, cholesterol, calories and sodium. Continue medications at current doses. Eliuiqs 5mg BID, for life    Obtain a nuclear myocardial perfusion stress test to r/o cardiac ischemia. Patient was advised and encouraged to check blood pressure at home or at a pharmacy, maintain a logbook, and also call us back if blood pressure are above the target ranges or if it is low. Patient clearly understands and agrees to the instructions.      We will need to continue to monitor muscle and liver enzymes, BUN, CR, and

## 2022-01-19 ENCOUNTER — HOSPITAL ENCOUNTER (OUTPATIENT)
Dept: NUCLEAR MEDICINE | Age: 57
Discharge: HOME | End: 2022-01-21
Payer: COMMERCIAL

## 2022-01-19 ENCOUNTER — HOSPITAL ENCOUNTER (OUTPATIENT)
Dept: NON INVASIVE DIAGNOSTICS | Age: 57
Discharge: HOME | End: 2022-01-19
Payer: COMMERCIAL

## 2022-01-19 DIAGNOSIS — R94.31 ABNORMAL EKG: ICD-10-CM

## 2022-01-19 LAB
LV EF: 65 %
LVEF MODALITY: NORMAL

## 2022-01-19 PROCEDURE — 78452 HT MUSCLE IMAGE SPECT MULT: CPT

## 2022-01-19 PROCEDURE — 93017 CV STRESS TEST TRACING ONLY: CPT

## 2022-01-19 PROCEDURE — 2580000003 HC RX 258: Performed by: INTERNAL MEDICINE

## 2022-01-19 PROCEDURE — 78452 HT MUSCLE IMAGE SPECT MULT: CPT | Performed by: INTERNAL MEDICINE

## 2022-01-19 PROCEDURE — 6360000002 HC RX W HCPCS: Performed by: INTERNAL MEDICINE

## 2022-01-19 PROCEDURE — 3430000000 HC RX DIAGNOSTIC RADIOPHARMACEUTICAL: Performed by: INTERNAL MEDICINE

## 2022-01-19 PROCEDURE — A9502 TC99M TETROFOSMIN: HCPCS | Performed by: INTERNAL MEDICINE

## 2022-01-19 RX ORDER — SODIUM CHLORIDE 0.9 % (FLUSH) 0.9 %
10 SYRINGE (ML) INJECTION PRN
Status: DISCONTINUED | OUTPATIENT
Start: 2022-01-19 | End: 2022-01-22 | Stop reason: HOSPADM

## 2022-01-19 RX ADMIN — Medication 10 ML: at 09:31

## 2022-01-19 RX ADMIN — TETROFOSMIN 35.8 MILLICURIE: 1.38 INJECTION, POWDER, LYOPHILIZED, FOR SOLUTION INTRAVENOUS at 09:31

## 2022-01-19 RX ADMIN — Medication 10 ML: at 08:16

## 2022-01-19 RX ADMIN — TETROFOSMIN 11.6 MILLICURIE: 1.38 INJECTION, POWDER, LYOPHILIZED, FOR SOLUTION INTRAVENOUS at 08:16

## 2022-01-19 RX ADMIN — REGADENOSON 0.4 MG: 0.08 INJECTION, SOLUTION INTRAVENOUS at 09:31

## 2022-01-19 NOTE — PROGRESS NOTES
Hx,allergies and medications reviewed. Patient held his home medications prior to testing. Patient unable to tolerate treadmill so changed to lexiscan testing. Explained lexiscan to patient. Jamal 5657 here. Injected patient with lexiscan and Myoview. Tolerated procedure well. SOB reported. Returned to baseline in recovery. Denied chest pain or pressure. EKG shows no noted ectopy.

## 2022-03-17 ENCOUNTER — HOSPITAL ENCOUNTER (OUTPATIENT)
Dept: ULTRASOUND IMAGING | Age: 57
Discharge: HOME | End: 2022-03-19
Payer: COMMERCIAL

## 2022-03-17 ENCOUNTER — HOSPITAL ENCOUNTER (OUTPATIENT)
Dept: CT IMAGING | Age: 57
Discharge: HOME | End: 2022-03-19
Payer: COMMERCIAL

## 2022-03-17 DIAGNOSIS — I26.09 ACUTE COR PULMONALE (HCC): ICD-10-CM

## 2022-03-17 DIAGNOSIS — I82.4Z1 DEEP VEIN THROMBOSIS (DVT) OF DISTAL VEIN OF RIGHT LOWER EXTREMITY, UNSPECIFIED CHRONICITY (HCC): ICD-10-CM

## 2022-03-17 LAB
GFR AFRICAN AMERICAN: >60
GFR NON-AFRICAN AMERICAN: >60
PERFORMED ON: NORMAL
POC CREATININE: 1 MG/DL (ref 0.8–1.3)
POC SAMPLE TYPE: NORMAL

## 2022-03-17 PROCEDURE — 6360000004 HC RX CONTRAST MEDICATION: Performed by: INTERNAL MEDICINE

## 2022-03-17 PROCEDURE — 93971 EXTREMITY STUDY: CPT

## 2022-03-17 PROCEDURE — 71275 CT ANGIOGRAPHY CHEST: CPT

## 2022-03-17 RX ORDER — SODIUM CHLORIDE 0.9 % (FLUSH) 0.9 %
10 SYRINGE (ML) INJECTION ONCE
Status: DISCONTINUED | OUTPATIENT
Start: 2022-03-17 | End: 2022-03-20 | Stop reason: HOSPADM

## 2022-03-17 RX ADMIN — IOPAMIDOL 100 ML: 612 INJECTION, SOLUTION INTRAVENOUS at 08:03

## 2022-04-07 ENCOUNTER — OFFICE VISIT (OUTPATIENT)
Dept: PULMONOLOGY | Age: 57
End: 2022-04-07
Payer: COMMERCIAL

## 2022-04-07 VITALS
BODY MASS INDEX: 39.48 KG/M2 | TEMPERATURE: 98.5 F | SYSTOLIC BLOOD PRESSURE: 114 MMHG | HEIGHT: 71 IN | WEIGHT: 282 LBS | DIASTOLIC BLOOD PRESSURE: 66 MMHG | HEART RATE: 72 BPM | OXYGEN SATURATION: 97 %

## 2022-04-07 DIAGNOSIS — G47.33 OSA ON CPAP: Primary | ICD-10-CM

## 2022-04-07 DIAGNOSIS — I82.401 DEEP VEIN THROMBOSIS (DVT) OF RIGHT LOWER EXTREMITY, UNSPECIFIED CHRONICITY, UNSPECIFIED VEIN (HCC): ICD-10-CM

## 2022-04-07 DIAGNOSIS — I26.02 ACUTE SADDLE PULMONARY EMBOLISM WITH ACUTE COR PULMONALE (HCC): ICD-10-CM

## 2022-04-07 DIAGNOSIS — Z99.89 OSA ON CPAP: Primary | ICD-10-CM

## 2022-04-07 DIAGNOSIS — E66.9 OBESITY (BMI 30-39.9): ICD-10-CM

## 2022-04-07 PROCEDURE — 99214 OFFICE O/P EST MOD 30 MIN: CPT | Performed by: INTERNAL MEDICINE

## 2022-04-07 PROCEDURE — 1036F TOBACCO NON-USER: CPT | Performed by: INTERNAL MEDICINE

## 2022-04-07 PROCEDURE — G8417 CALC BMI ABV UP PARAM F/U: HCPCS | Performed by: INTERNAL MEDICINE

## 2022-04-07 PROCEDURE — G8427 DOCREV CUR MEDS BY ELIG CLIN: HCPCS | Performed by: INTERNAL MEDICINE

## 2022-04-07 PROCEDURE — 3017F COLORECTAL CA SCREEN DOC REV: CPT | Performed by: INTERNAL MEDICINE

## 2022-04-07 ASSESSMENT — ENCOUNTER SYMPTOMS
COUGH: 0
RHINORRHEA: 0
DIARRHEA: 0
NAUSEA: 0
SHORTNESS OF BREATH: 0
SORE THROAT: 0
EYE ITCHING: 0
VOICE CHANGE: 0
VOMITING: 0
WHEEZING: 0
ABDOMINAL PAIN: 0
CHEST TIGHTNESS: 0

## 2022-04-07 NOTE — PROGRESS NOTES
Subjective:     Lakisha Samano is a 60 y.o. male who complains today of:     Chief Complaint   Patient presents with    Sleep Apnea     4 month f/u       HPI  He is using CPAP with  11 centimeters of H2O with heated humidity. He is using CPAP for about  7 hours every night. He is using CPAP with Nasal  Mask. He said  sleep is restful with the CPAP use. He is compliant with CPAP therapy and benefiting with CPAP use. No snoring with CPAP use. No complaint of daytime sleepiness or tiredness with CPAP use. He denies taking naps. No sleepiness with driving. He denies difficulty falling asleep or staying asleep.     I reviewed compliance report with patient regarding CPAP therapy. He is using  CPAP for 30 days out of 30 days  Average usage of days used is 7 hours and 10 min , average AHI 1.5 with CPAP use.          He had a PE thrombectomy done and significant amount of blood clot was pulled out. He also had DVT rt. Leg . he is on Eliquis. Following dr. Medina Leach     Allergies:  Quinolones  Past Medical History:   Diagnosis Date    Abnormal EKG 12/3/2021    Chronic back pain     Diabetes ([address]     GERD (gastroesophageal reflux disease)     High cholesterol     History of DVT (deep vein thrombosis) 6/3/2021    History of pulmonary embolism 6/3/2021    Hypertension     IBS (irritable bowel syndrome)     Kidney, malrotation     Sleep apnea      No past surgical history on file. Family History   Problem Relation Age of Onset    Asthma Mother     Heart Disease Father      Social History     Socioeconomic History    Marital status: Single     Spouse name: Not on file    Number of children: Not on file    Years of education: Not on file    Highest education level: Not on file   Occupational History    Not on file   Tobacco Use    Smoking status: Never Smoker    Smokeless tobacco: Never Used   Vaping Use    Vaping Use: Never used   Substance and Sexual Activity    Alcohol use: No    Drug use:  No  Sexual activity: Not on file   Other Topics Concern    Not on file   Social History Narrative    Not on file     Social Determinants of Health     Financial Resource Strain:     Difficulty of Paying Living Expenses: Not on file   Food Insecurity:     Worried About Running Out of Food in the Last Year: Not on file    David of Food in the Last Year: Not on file   Transportation Needs:     Lack of Transportation (Medical): Not on file    Lack of Transportation (Non-Medical): Not on file   Physical Activity:     Days of Exercise per Week: Not on file    Minutes of Exercise per Session: Not on file   Stress:     Feeling of Stress : Not on file   Social Connections:     Frequency of Communication with Friends and Family: Not on file    Frequency of Social Gatherings with Friends and Family: Not on file    Attends Protestant Services: Not on file    Active Member of [address] [place] [address] [employer] or Organizations: Not on file    Attends Club or Organization Meetings: Not on file    Marital Status: Not on file   Intimate Partner Violence:     Fear of Current or Ex-Partner: Not on file    Emotionally Abused: Not on file    Physically Abused: Not on file    Sexually Abused: Not on file   Housing Stability:     Unable to Pay for Housing in the Last Year: Not on file    Number of [place] in the Last Year: Not on file    Unstable Housing in the Last Year: Not on file         Review of Systems   Constitutional: Negative for chills, diaphoresis, fatigue and fever. HENT: Negative for congestion, mouth sores, nosebleeds, postnasal drip, rhinorrhea, sneezing, sore throat and voice change. Eyes: Negative for itching and visual disturbance. Respiratory: Negative for cough, chest tightness, shortness of breath and wheezing. Cardiovascular: Negative. Negative for chest pain, palpitations and leg swelling. Gastrointestinal: Negative for abdominal pain, diarrhea, nausea and vomiting.    Genitourinary: Negative for Supplies ROSALIND New Full face CPAP mask and supplies. 1 Device 0    CPAP Machine MISC by Does not apply route New CPAP with 11 cm 1 each 0    famotidine (PEPCID) 20 MG tablet Take 20 mg by mouth 2 times daily as needed      CPAP Machine MISC 11 cm by Does not apply route      lisinopril (PRINIVIL;ZESTRIL) 10 MG tablet Take 10 mg by mouth daily      metFORMIN (GLUCOPHAGE) 500 MG tablet Take 500 mg by mouth 2 times daily (with meals)      ibuprofen (ADVIL;MOTRIN) 200 MG tablet Take 200 mg by mouth every 6 hours as needed for Pain      ranitidine (ZANTAC 150 MAXIMUM STRENGTH) 150 MG tablet Take 150 mg by mouth 2 times daily       No current facility-administered medications for this visit. No results found for this or any previous visit.  ]  Results for orders placed during the hospital encounter of 04/21/21    XR CHEST PORTABLE    Narrative  Exam: XR CHEST PORTABLE    History:  sob    Technique: AP portable view of the chest obtained. Comparison: Chest x-ray from May 21, 2013    Chest x-ray portable  Findings: The cardiomediastinal silhouette is within normal limits. There are no infiltrates, consolidations or effusions. Bones of the thorax appear intact. Impression  No radiographic evidence of acute intrathoracic process. Assessment/Plan:     1. ROSA on CPAP  He is using CPAP with  11 centimeters of H2O with heated humidity. He is using CPAP for about  7 hours every night. He is using CPAP with Nasal  Mask. He said  sleep is restful with the CPAP use. He is compliant with CPAP therapy and benefiting with CPAP use. No snoring with CPAP use. Continue CPAP as before     I reviewed compliance report with patient regarding CPAP therapy. He is using  CPAP for 30 days out of 30 days  Average usage of days used is 7 hours and 10 min , average AHI 1.5 with CPAP use. Counseling: CPAP/BiPAP uses, He advised to use CPAP at least 5-6 hours every night.     Driving: He is advised for extreme caution when driving or operating machinery if there is a feeling of drowsiness, especially while driving it is preferable to stop driving and take a brief nap. Sleep hygiene:Avoid supine sleep, sleep on  sides. Avoid  sleep deprivation. Explained sleep hygiene. Advice to avoid Alcohol and sedative. 2. Obesity (BMI 30-39. 9)  He is advised try to lose weight. obesity related risk explained to the patient ,  Current weight:  282 lb (127.9 kg) Lbs. BMI:  Body mass index is 39.33 kg/m². Suggested weight control approaches, including dietary changes , exercise, behavioral modification. 3. Acute saddle pulmonary embolism with acute cor pulmonale (HCC) s/p thrombectomy  He had a PE thrombectomy done and significant amount of blood clot was pulled out. He also had DVT rt. Leg . he is on Eliquis. Following dr. Oliver January 4. Deep vein thrombosis (DVT) of right lower extremity, unspecified chronicity, unspecified vein (HCC)  Currently is on Eliquis. Return in about 4 months (around 8/7/2022) for jose.       Patsy Kumar MD

## 2022-04-29 ENCOUNTER — OFFICE VISIT (OUTPATIENT)
Dept: ENDOCRINOLOGY | Age: 57
End: 2022-04-29
Payer: COMMERCIAL

## 2022-04-29 VITALS
DIASTOLIC BLOOD PRESSURE: 80 MMHG | BODY MASS INDEX: 40.04 KG/M2 | SYSTOLIC BLOOD PRESSURE: 135 MMHG | HEART RATE: 78 BPM | OXYGEN SATURATION: 96 % | WEIGHT: 286 LBS | HEIGHT: 71 IN

## 2022-04-29 DIAGNOSIS — R53.83 FATIGUE, UNSPECIFIED TYPE: ICD-10-CM

## 2022-04-29 DIAGNOSIS — E11.69 TYPE 2 DIABETES MELLITUS WITH OTHER SPECIFIED COMPLICATION, UNSPECIFIED WHETHER LONG TERM INSULIN USE (HCC): Primary | ICD-10-CM

## 2022-04-29 DIAGNOSIS — E66.01 MORBID OBESITY (HCC): ICD-10-CM

## 2022-04-29 DIAGNOSIS — E11.69 TYPE 2 DIABETES MELLITUS WITH OTHER SPECIFIED COMPLICATION, UNSPECIFIED WHETHER LONG TERM INSULIN USE (HCC): ICD-10-CM

## 2022-04-29 LAB
ANION GAP SERPL CALCULATED.3IONS-SCNC: 16 MEQ/L (ref 9–15)
BUN BLDV-MCNC: 20 MG/DL (ref 6–20)
CALCIUM SERPL-MCNC: 8.9 MG/DL (ref 8.5–9.9)
CHLORIDE BLD-SCNC: 103 MEQ/L (ref 95–107)
CHP ED QC CHECK: NORMAL
CO2: 21 MEQ/L (ref 20–31)
CREAT SERPL-MCNC: 0.83 MG/DL (ref 0.7–1.2)
GFR AFRICAN AMERICAN: >60
GFR NON-AFRICAN AMERICAN: >60
GLUCOSE BLD-MCNC: 217 MG/DL
GLUCOSE FASTING: 218 MG/DL (ref 70–99)
HBA1C MFR BLD: 6.6 %
POTASSIUM SERPL-SCNC: 4.8 MEQ/L (ref 3.4–4.9)
SODIUM BLD-SCNC: 140 MEQ/L (ref 135–144)

## 2022-04-29 PROCEDURE — 82962 GLUCOSE BLOOD TEST: CPT | Performed by: INTERNAL MEDICINE

## 2022-04-29 PROCEDURE — G8427 DOCREV CUR MEDS BY ELIG CLIN: HCPCS | Performed by: INTERNAL MEDICINE

## 2022-04-29 PROCEDURE — 2022F DILAT RTA XM EVC RTNOPTHY: CPT | Performed by: INTERNAL MEDICINE

## 2022-04-29 PROCEDURE — 1036F TOBACCO NON-USER: CPT | Performed by: INTERNAL MEDICINE

## 2022-04-29 PROCEDURE — 3017F COLORECTAL CA SCREEN DOC REV: CPT | Performed by: INTERNAL MEDICINE

## 2022-04-29 PROCEDURE — G8417 CALC BMI ABV UP PARAM F/U: HCPCS | Performed by: INTERNAL MEDICINE

## 2022-04-29 PROCEDURE — 83036 HEMOGLOBIN GLYCOSYLATED A1C: CPT | Performed by: INTERNAL MEDICINE

## 2022-04-29 PROCEDURE — 99203 OFFICE O/P NEW LOW 30 MIN: CPT | Performed by: INTERNAL MEDICINE

## 2022-04-29 PROCEDURE — 3044F HG A1C LEVEL LT 7.0%: CPT | Performed by: INTERNAL MEDICINE

## 2022-04-29 RX ORDER — LANCETS 33 GAUGE
EACH MISCELLANEOUS
Qty: 100 EACH | Refills: 3 | Status: SHIPPED | OUTPATIENT
Start: 2022-04-29 | End: 2022-08-26 | Stop reason: SDUPTHER

## 2022-04-29 RX ORDER — BLOOD SUGAR DIAGNOSTIC
1 STRIP MISCELLANEOUS 2 TIMES DAILY
Qty: 100 EACH | Refills: 3 | Status: SHIPPED | OUTPATIENT
Start: 2022-04-29 | End: 2022-08-26 | Stop reason: SDUPTHER

## 2022-04-29 RX ORDER — BLOOD-GLUCOSE METER
EACH MISCELLANEOUS
Qty: 1 KIT | Refills: 1 | Status: SHIPPED | OUTPATIENT
Start: 2022-04-29

## 2022-04-29 NOTE — PROGRESS NOTES
2022    Assessment:       Diagnosis Orders   1. Type 2 diabetes mellitus with other specified complication, unspecified whether long term insulin use (HCC)  POCT Glucose    POCT glycosylated hemoglobin (Hb A1C)    Basic Metabolic Panel, Fasting    Soumya Olvera MD, Internal Medicine, [place]   2. Fatigue, unspecified type  Testosterone, free, total   3. Morbid obesity ([address]           PLAN:     Orders Placed This Encounter   Procedures    Basic Metabolic Panel, Fasting     Standing Status:   Future     Number of Occurrences:   1     Standing Expiration Date:   2023    Testosterone, free, total     Standing Status:   Future     Number of Occurrences:   1     Standing Expiration Date:   2023   Hemant Ang MD, Internal Medicine, [place]     Referral Priority:   Routine     Referral Type:   Eval and Treat     Referral Reason:   Specialty Services Required     Referred to Provider:   Jessa Araya MD     Requested Specialty:   Internal Medicine     Number of Visits Requested:   1    POCT Glucose    POCT glycosylated hemoglobin (Hb A1C)     Orders Placed This Encounter   Medications    blood glucose test strips (ONETOUCH VERIO) strip     Si each by In Vitro route 2 times daily As needed.      Dispense:  100 each     Refill:  3    OneTouch Delica Lancets 50D MISC     Sig: bid     Dispense:  100 each     Refill:  3    Blood Glucose Monitoring Suppl (Shreyas Latham) w/Device KIT     Sig: As directed     Dispense:  1 kit     Refill:  1    metFORMIN (GLUCOPHAGE) 500 MG tablet     Sig: Take 1 tablet by mouth 2 times daily (with meals)     Dispense:  60 tablet     Refill:  5    SITagliptin (JANUVIA) 100 MG tablet     Sig: Take 1 tablet by mouth daily     Dispense:  90 tablet     Refill:  1     Continue metformin and Januvia A1c goal of 6.5 we will also get a testosterone level discussed testosterone placement at the time of follow-up more than 50% of 30 minutes spent patient education counseling    Orders Placed This Encounter   Procedures    POCT Glucose    POCT glycosylated hemoglobin (Hb A1C)       Subjective:     Chief Complaint   Patient presents with    New Patient    Diabetes     Vitals:    04/29/22 1350   BP: 135/80   Pulse: 78   SpO2: 96%   Weight: 286 lb (129.7 kg)   Height: 5' 11\" (1.803 m)     Wt Readings from Last 3 Encounters:   04/29/22 286 lb (129.7 kg)   04/07/22 282 lb (127.9 kg)   12/03/21 281 lb (127.5 kg)     BP Readings from Last 3 Encounters:   04/29/22 135/80   04/07/22 114/66   12/03/21 130/80     Patient referred here for uncontrolled type 2 diabetes currently only on metformin history of obesity BMI is at 39  Blood sugars between 1-200 range  Hemoglobin A1c was 6.6 patient also complains of fatigue    Symptoms of hypogonadism we will get a testosterone level    Diabetes  He presents for his initial diabetic visit. He has type 2 diabetes mellitus. His disease course has been fluctuating. There are no hypoglycemic associated symptoms. Pertinent negatives for hypoglycemia include no headaches. Associated symptoms include fatigue. Pertinent negatives for diabetes include no polydipsia, no polyuria, no visual change and no weight loss. Current diabetic treatment includes oral agent (monotherapy) (Metformin). His overall blood glucose range is 140-180 mg/dl. (Lab Results       Component                Value               Date                       LABA1C                   6.6                 04/29/2022              ) An ACE inhibitor/angiotensin II receptor blocker is being taken. Fatigue  This is a new problem. The current episode started more than 1 year ago. The problem has been waxing and waning. Associated symptoms include fatigue. Pertinent negatives include no headaches or visual change.      Past Medical History:   Diagnosis Date    Abnormal EKG 12/3/2021    Chronic back pain     Diabetes (HCC)     GERD (gastroesophageal reflux disease)     High cholesterol     History of DVT (deep vein thrombosis) 6/3/2021    History of pulmonary embolism 6/3/2021    Hypertension     IBS (irritable bowel syndrome)     Kidney, malrotation     Sleep apnea      No past surgical history on file. Social History     Socioeconomic History    Marital status: Single     Spouse name: Not on file    Number of children: Not on file    Years of education: Not on file    Highest education level: Not on file   Occupational History    Not on file   Tobacco Use    Smoking status: Never Smoker    Smokeless tobacco: Never Used   Vaping Use    Vaping Use: Never used   Substance and Sexual Activity    Alcohol use: No    Drug use: No    Sexual activity: Not on file   Other Topics Concern    Not on file   Social History Narrative    Not on file     Social Determinants of Health     Financial Resource Strain:     Difficulty of Paying Living Expenses: Not on file   Food Insecurity:     Worried About Running Out of Food in the Last Year: Not on file    David of Food in the Last Year: Not on file   Transportation Needs:     Lack of Transportation (Medical): Not on file    Lack of Transportation (Non-Medical):  Not on file   Physical Activity:     Days of Exercise per Week: Not on file    Minutes of Exercise per Session: Not on file   Stress:     Feeling of Stress : Not on file   Social Connections:     Frequency of Communication with Friends and Family: Not on file    Frequency of Social Gatherings with Friends and Family: Not on file    Attends Religion Services: Not on file    Active Member of Clubs or Organizations: Not on file    Attends Club or Organization Meetings: Not on file    Marital Status: Not on file   Intimate Partner Violence:     Fear of Current or Ex-Partner: Not on file    Emotionally Abused: Not on file    Physically Abused: Not on file    Sexually Abused: Not on file   Housing Stability:     Unable to Pay for Housing in the Last Year: Not on file    Number of Places Lived in the Last Year: Not on file    Unstable Housing in the Last Year: Not on file     Family History   Problem Relation Age of Onset    Asthma Mother     Heart Disease Father      Allergies   Allergen Reactions    Quinolones        Current Outpatient Medications:     apixaban (ELIQUIS) 5 MG TABS tablet, TAKE 1 TABLET BY MOUTH 2 TIMES DAILY, Disp: 60 tablet, Rfl: 5    Respiratory Therapy Supplies ROSALIND, New Full face CPAP mask and supplies. , Disp: 1 Device, Rfl: 0    CPAP Machine MISC, by Does not apply route New CPAP with 11 cm, Disp: 1 each, Rfl: 0    famotidine (PEPCID) 20 MG tablet, Take 20 mg by mouth 2 times daily as needed, Disp: , Rfl:     CPAP Machine MISC, 11 cm by Does not apply route, Disp: , Rfl:     lisinopril (PRINIVIL;ZESTRIL) 10 MG tablet, Take 10 mg by mouth daily, Disp: , Rfl:     metFORMIN (GLUCOPHAGE) 500 MG tablet, Take 500 mg by mouth 2 times daily (with meals), Disp: , Rfl:     ibuprofen (ADVIL;MOTRIN) 200 MG tablet, Take 200 mg by mouth every 6 hours as needed for Pain, Disp: , Rfl:     ranitidine (ZANTAC 150 MAXIMUM STRENGTH) 150 MG tablet, Take 150 mg by mouth 2 times daily, Disp: , Rfl:   Lab Results   Component Value Date     04/22/2021    K 4.0 04/22/2021     (H) 04/22/2021    CO2 19 (L) 04/22/2021    BUN 12 04/22/2021    CREATININE 1.0 03/17/2022    GLUCOSE 217 04/29/2022    CALCIUM 8.6 04/22/2021    PROT 7.8 04/21/2021    LABALBU 4.3 04/21/2021    BILITOT 0.7 04/21/2021    ALKPHOS 77 04/21/2021    AST 20 04/21/2021    ALT 35 04/21/2021    LABGLOM >60 03/17/2022    GFRAA >60 03/17/2022    GLOB 3.5 04/21/2021     Lab Results   Component Value Date    WBC 11.2 (H) 04/22/2021    HGB 13.6 (L) 04/22/2021    HCT 41.9 (L) 04/22/2021    MCV 92.5 04/22/2021     04/22/2021     Lab Results   Component Value Date    LABA1C 6.6 04/29/2022    LABA1C 6.2 (H) 11/01/2014    LABA1C 6.0 (H) 02/01/2014     Lab Results   Component Value Date HDL 28 (L) 04/22/2021    HDL 35 (L) 05/21/2013    LDLCALC 106 04/22/2021    LDLCALC 126 05/21/2013    CHOL 169 04/22/2021    CHOL 264 (H) 05/21/2013    TRIG 173 (H) 04/22/2021    TRIG 516 (H) 05/21/2013     No results found for: TESTM  Lab Results   Component Value Date    TSH 0.863 05/21/2013     No results found for: TPOABS    Review of Systems   Constitutional: Positive for fatigue. Negative for weight loss. Eyes: Negative. Respiratory: Negative. Cardiovascular: Negative. Endocrine: Negative for polydipsia and polyuria. Musculoskeletal: Positive for back pain. Neurological: Negative for headaches. Psychiatric/Behavioral: Positive for sleep disturbance. All other systems reviewed and are negative. Objective:   Physical Exam  Vitals reviewed. Constitutional:       General: He is not in acute distress. Appearance: Normal appearance. He is obese. HENT:      Head: Normocephalic and atraumatic. Right Ear: External ear normal.      Left Ear: External ear normal.      Nose: Nose normal.      Mouth/Throat:      Mouth: Mucous membranes are moist.   Eyes:      General: No scleral icterus. Right eye: No discharge. Left eye: No discharge. Extraocular Movements: Extraocular movements intact. Conjunctiva/sclera: Conjunctivae normal.   Cardiovascular:      Rate and Rhythm: Normal rate and regular rhythm. Heart sounds: Normal heart sounds. Pulmonary:      Effort: Pulmonary effort is normal.      Breath sounds: Normal breath sounds. No wheezing or rhonchi. Abdominal:      Palpations: Abdomen is soft. Musculoskeletal:         General: Normal range of motion. Cervical back: Normal range of motion and neck supple. Right lower leg: No edema. Left lower leg: No edema. Feet:    Skin:     General: Skin is warm and dry. Neurological:      General: No focal deficit present.       Mental Status: He is alert and oriented to person, place, and time.   Psychiatric:         Mood and Affect: Mood normal.         Behavior: Behavior normal.

## 2022-04-30 LAB
SEX HORMONE BINDING GLOBULIN: 30 NMOL/L (ref 11–80)
TESTOSTERONE FREE-NONMALE: 39.1 PG/ML (ref 47–244)
TESTOSTERONE TOTAL: 195 NG/DL (ref 220–1000)

## 2022-05-01 ASSESSMENT — ENCOUNTER SYMPTOMS
BACK PAIN: 1
EYES NEGATIVE: 1
RESPIRATORY NEGATIVE: 1
VISUAL CHANGE: 0

## 2022-05-27 ENCOUNTER — OFFICE VISIT (OUTPATIENT)
Dept: ENDOCRINOLOGY | Age: 57
End: 2022-05-27
Payer: COMMERCIAL

## 2022-05-27 VITALS
OXYGEN SATURATION: 94 % | BODY MASS INDEX: 40.04 KG/M2 | WEIGHT: 286 LBS | HEART RATE: 81 BPM | SYSTOLIC BLOOD PRESSURE: 137 MMHG | HEIGHT: 71 IN | DIASTOLIC BLOOD PRESSURE: 80 MMHG

## 2022-05-27 DIAGNOSIS — E11.69 TYPE 2 DIABETES MELLITUS WITH OTHER SPECIFIED COMPLICATION, UNSPECIFIED WHETHER LONG TERM INSULIN USE (HCC): Primary | ICD-10-CM

## 2022-05-27 DIAGNOSIS — E29.1 HYPOGONADISM IN MALE: ICD-10-CM

## 2022-05-27 LAB
CHP ED QC CHECK: NORMAL
GLUCOSE BLD-MCNC: 199 MG/DL

## 2022-05-27 PROCEDURE — G8417 CALC BMI ABV UP PARAM F/U: HCPCS | Performed by: INTERNAL MEDICINE

## 2022-05-27 PROCEDURE — 3017F COLORECTAL CA SCREEN DOC REV: CPT | Performed by: INTERNAL MEDICINE

## 2022-05-27 PROCEDURE — 3044F HG A1C LEVEL LT 7.0%: CPT | Performed by: INTERNAL MEDICINE

## 2022-05-27 PROCEDURE — 99213 OFFICE O/P EST LOW 20 MIN: CPT | Performed by: INTERNAL MEDICINE

## 2022-05-27 PROCEDURE — G8427 DOCREV CUR MEDS BY ELIG CLIN: HCPCS | Performed by: INTERNAL MEDICINE

## 2022-05-27 PROCEDURE — 2022F DILAT RTA XM EVC RTNOPTHY: CPT | Performed by: INTERNAL MEDICINE

## 2022-05-27 PROCEDURE — 1036F TOBACCO NON-USER: CPT | Performed by: INTERNAL MEDICINE

## 2022-05-27 PROCEDURE — 82962 GLUCOSE BLOOD TEST: CPT | Performed by: INTERNAL MEDICINE

## 2022-05-27 RX ORDER — NEEDLES, FILTER 19GX1 1/2"
1 NEEDLE, DISPOSABLE MISCELLANEOUS DAILY
Qty: 20 EACH | Refills: 6 | Status: SHIPPED | OUTPATIENT
Start: 2022-05-27 | End: 2022-08-26 | Stop reason: SDUPTHER

## 2022-05-27 RX ORDER — TESTOSTERONE ENANTHATE 200 MG/ML
INJECTION, SOLUTION INTRAMUSCULAR
Qty: 10 ML | Refills: 2 | Status: SHIPPED | OUTPATIENT
Start: 2022-05-27 | End: 2022-06-16

## 2022-05-27 NOTE — PROGRESS NOTES
2022    Assessment:       Diagnosis Orders   1. Type 2 diabetes mellitus with other specified complication, unspecified whether long term insulin use (HCC)  POCT Glucose   2.  Hypogonadism in male           PLAN:     Orders Placed This Encounter   Procedures    Hemoglobin A1C     Standing Status:   Future     Standing Expiration Date:   2023    Basic Metabolic Panel, Fasting     Standing Status:   Future     Standing Expiration Date:   2023    Lipid, Fasting     Standing Status:   Future     Standing Expiration Date:   2023    Microalbumin / Creatinine Urine Ratio     Standing Status:   Future     Standing Expiration Date:   2023    Testosterone, free, total     Standing Status:   Future     Standing Expiration Date:   2023    POCT Glucose    HM DIABETES FOOT EXAM     Continue current dose of metformin Januvia  Continue current dose of testosterone Target testosterone 500  A1c goal of 7 or lower  [facility] report reviewed  Orders Placed This Encounter   Medications    testosterone enanthate (DELATESTRYL) 200 MG/ML injection     Si cc every 2 weeks     Dispense:  10 mL     Refill:  2    SYRINGE-NEEDLE, DISP, 3 ML (B-D INTE[employer]RA SYRINGE) 22G X 1-1/2\" 3 ML MISC     Si each by Does not apply route daily     Dispense:  20 each     Refill:  6    metFORMIN (GLUCOPHAGE) 1000 MG tablet     Sig: Take 1 tablet by mouth 2 times daily (with meals)     Dispense:  60 tablet     Refill:  06    SITagliptin (JANUVIA) 100 MG tablet     Sig: Take 1 tablet by mouth daily     Dispense:  30 tablet     Refill:  5       Subjective:     Chief Complaint   Patient presents with    Diabetes    Hypothyroidism     Vitals:    22 1303   BP: 137/80   Pulse: 81   SpO2: 94%   Weight: 286 lb (129.7 kg)   Height: 5' 11\" (1.803 m)     Wt Readings from Last 3 Encounters:   22 286 lb (129.7 kg)   22 286 lb (129.7 kg)   22 282 lb (127.9 kg)     BP Readings from Last 3 Encounters:   22 137/80   04/29/22 135/80   04/07/22 114/66     Follow-up on type 2 diabetes patient on metformin and Januvia A1c has been stable labs were reviewed  Hypogonadism on testosterone replacement  History of obesity BMI 38  Last hemoglobin A1c was 6.6  Last testosterone level 195 on the low side we will start testosterone injections    Diabetes  He presents for his follow-up diabetic visit. He has type 2 diabetes mellitus. Associated symptoms include fatigue. Pertinent negatives for diabetes include no polydipsia and no polyuria. Symptoms are improving. Risk factors for coronary artery disease include obesity. Current diabetic treatment includes oral agent (dual therapy). His overall blood glucose range is 110-130 mg/dl. (Lab Results       Component                Value               Date                       LABA1C                   6.6                 04/29/2022              )     Past Medical History:   Diagnosis Date    Abnormal EKG 12/3/2021    Chronic back pain     Diabetes ([address]     GERD (gastroesophageal reflux disease)     High cholesterol     History of DVT (deep vein thrombosis) 6/3/2021    History of pulmonary embolism 6/3/2021    Hypertension     IBS (irritable bowel syndrome)     Kidney, malrotation     Sleep apnea      No past surgical history on file.   Social History     Socioeconomic History    Marital status: Single     Spouse name: Not on file    Number of children: Not on file    Years of education: Not on file    Highest education level: Not on file   Occupational History    Not on file   Tobacco Use    Smoking status: Never Smoker    Smokeless tobacco: Never Used   Vaping Use    Vaping Use: Never used   Substance and Sexual Activity    Alcohol use: No    Drug use: No    Sexual activity: Not on file   Other Topics Concern    Not on file   Social History Narrative    Not on file     Social Determinants of Health     Financial Resource Strain:     Difficulty of Paying Living Expenses: Not on file   Food Insecurity:     Worried About Running Out of Food in the Last Year: Not on file    David of Food in the Last Year: Not on file   Transportation Needs:     Lack of Transportation (Medical): Not on file    Lack of Transportation (Non-Medical): Not on file   Physical Activity:     Days of Exercise per Week: Not on file    Minutes of Exercise per Session: Not on file   Stress:     Feeling of Stress : Not on file   Social Connections:     Frequency of Communication with Friends and Family: Not on file    Frequency of Social Gatherings with Friends and Family: Not on file    Attends Orthodoxy Services: Not on file    Active Member of [address] [place] [address] or Organizations: Not on file    Attends Club or Organization Meetings: Not on file    Marital Status: Not on file   Intimate Partner Violence:     Fear of Current or Ex-Partner: Not on file    Emotionally Abused: Not on file    Physically Abused: Not on file    Sexually Abused: Not on file   Housing Stability:     Unable to Pay for Housing in the Last Year: Not on file    Number of [place] in the Last Year: Not on file    Unstable Housing in the Last Year: Not on file     Family History   Problem Relation Age of Onset    Asthma Mother     Heart Disease Father      Allergies   Allergen Reactions    Quinolones        Current Outpatient Medications:     Esomeprazole Magnesium (NEXIUM PO), Take by mouth, Disp: , Rfl:     blood glucose test strips (ONETOUCH VERIO) strip, 1 each by In Vitro route 2 times daily As needed. , Disp: 100 each, Rfl: 3    OneTouch Delica Lancets 76N MISC, bid, Disp: 100 each, Rfl: 3    Blood Glucose Monitoring Suppl (Darcy Garcia) w/Device KIT, As directed, Disp: 1 kit, Rfl: 1    metFORMIN (GLUCOPHAGE) 500 MG tablet, Take 1 tablet by mouth 2 times daily (with meals), Disp: 60 tablet, Rfl: 5    SITagliptin (JANUVIA) 100 MG tablet, Take 1 tablet by mouth daily, Disp: 90 tablet, Rfl: 1    apixaban (ELIQUIS) 5 MG TABS tablet, TAKE 1 TABLET BY MOUTH 2 TIMES DAILY, Disp: 60 tablet, Rfl: 5    Respiratory Therapy Supplies ROSALIND, New Full face CPAP mask and supplies. , Disp: 1 Device, Rfl: 0    CPAP Machine MISC, by Does not apply route New CPAP with 11 cm, Disp: 1 each, Rfl: 0    lisinopril (PRINIVIL;ZESTRIL) 10 MG tablet, Take 10 mg by mouth daily, Disp: , Rfl:   Lab Results   Component Value Date     04/29/2022    K 4.8 04/29/2022     04/29/2022    CO2 21 04/29/2022    BUN 20 04/29/2022    CREATININE 0.83 04/29/2022    GLUCOSE 199 05/27/2022    CALCIUM 8.9 04/29/2022    PROT 7.8 04/21/2021    LABALBU 4.3 04/21/2021    BILITOT 0.7 04/21/2021    ALKPHOS 77 04/21/2021    AST 20 04/21/2021    ALT 35 04/21/2021    LABGLOM >60.0 04/29/2022    GFRAA >60.0 04/29/2022    GLOB 3.5 04/21/2021     Lab Results   Component Value Date    WBC 11.2 (H) 04/22/2021    HGB 13.6 (L) 04/22/2021    HCT 41.9 (L) 04/22/2021    MCV 92.5 04/22/2021     04/22/2021     Lab Results   Component Value Date    LABA1C 6.6 04/29/2022    LABA1C 6.2 (H) 11/01/2014    LABA1C 6.0 (H) 02/01/2014     Lab Results   Component Value Date    HDL 28 (L) 04/22/2021    HDL 35 (L) 05/21/2013    LDLCALC 106 04/22/2021    LDLCALC 126 05/21/2013    CHOL 169 04/22/2021    CHOL 264 (H) 05/21/2013    TRIG 173 (H) 04/22/2021    TRIG 516 (H) 05/21/2013     No results found for: TESTM  Lab Results   Component Value Date    TSH 0.863 05/21/2013     No results found for: TPOABS    Review of Systems   Constitutional: Positive for fatigue. Cardiovascular: Negative. Endocrine: Negative for polydipsia and polyuria. Genitourinary: Negative. All other systems reviewed and are negative. Objective:   Physical Exam  Vitals reviewed. Constitutional:       General: He is not in acute distress. Appearance: Normal appearance. He is obese. HENT:      Head: Normocephalic and atraumatic.       Right Ear: External ear normal.      Left Ear: External ear normal.      Nose: Nose normal.   Eyes:      General: No scleral icterus. Right eye: No discharge. Left eye: No discharge. Extraocular Movements: Extraocular movements intact. Conjunctiva/sclera: Conjunctivae normal.   Cardiovascular:      Rate and Rhythm: Normal rate. Pulmonary:      Effort: Pulmonary effort is normal.   Musculoskeletal:         General: Normal range of motion. Cervical back: Normal range of motion and neck supple. Neurological:      General: No focal deficit present. Mental Status: He is alert and oriented to person, place, and time.    Psychiatric:         Mood and Affect: Mood normal.         Behavior: Behavior normal.

## 2022-06-03 ENCOUNTER — OFFICE VISIT (OUTPATIENT)
Dept: CARDIOLOGY CLINIC | Age: 57
End: 2022-06-03
Payer: COMMERCIAL

## 2022-06-03 VITALS
WEIGHT: 289.8 LBS | HEART RATE: 84 BPM | OXYGEN SATURATION: 95 % | DIASTOLIC BLOOD PRESSURE: 88 MMHG | HEIGHT: 71 IN | BODY MASS INDEX: 40.57 KG/M2 | RESPIRATION RATE: 16 BRPM | SYSTOLIC BLOOD PRESSURE: 134 MMHG

## 2022-06-03 DIAGNOSIS — G47.33 OSA ON CPAP: ICD-10-CM

## 2022-06-03 DIAGNOSIS — Z86.711 HISTORY OF PULMONARY EMBOLISM: ICD-10-CM

## 2022-06-03 DIAGNOSIS — E11.69 TYPE 2 DIABETES MELLITUS WITH OTHER SPECIFIED COMPLICATION, UNSPECIFIED WHETHER LONG TERM INSULIN USE (HCC): ICD-10-CM

## 2022-06-03 DIAGNOSIS — Z86.718 HISTORY OF DVT (DEEP VEIN THROMBOSIS): Primary | ICD-10-CM

## 2022-06-03 DIAGNOSIS — Z99.89 OSA ON CPAP: ICD-10-CM

## 2022-06-03 DIAGNOSIS — E29.1 HYPOGONADISM IN MALE: ICD-10-CM

## 2022-06-03 DIAGNOSIS — E66.9 OBESITY (BMI 30-39.9): ICD-10-CM

## 2022-06-03 LAB
ANION GAP SERPL CALCULATED.3IONS-SCNC: 14 MEQ/L (ref 9–15)
BUN BLDV-MCNC: 19 MG/DL (ref 6–20)
CALCIUM SERPL-MCNC: 9.3 MG/DL (ref 8.5–9.9)
CHLORIDE BLD-SCNC: 104 MEQ/L (ref 95–107)
CHOLESTEROL, FASTING: 196 MG/DL (ref 0–199)
CO2: 23 MEQ/L (ref 20–31)
CREAT SERPL-MCNC: 0.8 MG/DL (ref 0.7–1.2)
CREATININE URINE: 39.1 MG/DL
GFR AFRICAN AMERICAN: >60
GFR NON-AFRICAN AMERICAN: >60
GLUCOSE FASTING: 107 MG/DL (ref 70–99)
HBA1C MFR BLD: 6.7 % (ref 4.8–5.9)
HDLC SERPL-MCNC: 31 MG/DL (ref 40–59)
LDL CHOLESTEROL CALCULATED: 126 MG/DL (ref 0–129)
MICROALBUMIN UR-MCNC: <1.2 MG/DL
MICROALBUMIN/CREAT UR-RTO: NORMAL MG/G (ref 0–30)
POTASSIUM SERPL-SCNC: 4.3 MEQ/L (ref 3.4–4.9)
SODIUM BLD-SCNC: 141 MEQ/L (ref 135–144)
TRIGLYCERIDE, FASTING: 193 MG/DL (ref 0–150)

## 2022-06-03 PROCEDURE — 1036F TOBACCO NON-USER: CPT | Performed by: INTERNAL MEDICINE

## 2022-06-03 PROCEDURE — G8417 CALC BMI ABV UP PARAM F/U: HCPCS | Performed by: INTERNAL MEDICINE

## 2022-06-03 PROCEDURE — 3017F COLORECTAL CA SCREEN DOC REV: CPT | Performed by: INTERNAL MEDICINE

## 2022-06-03 PROCEDURE — 99214 OFFICE O/P EST MOD 30 MIN: CPT | Performed by: INTERNAL MEDICINE

## 2022-06-03 PROCEDURE — G8427 DOCREV CUR MEDS BY ELIG CLIN: HCPCS | Performed by: INTERNAL MEDICINE

## 2022-06-03 RX ORDER — TESTOSTERONE CYPIONATE 200 MG/ML
INJECTION INTRAMUSCULAR
COMMUNITY
Start: 2022-05-28

## 2022-06-03 RX ORDER — FAMOTIDINE 20 MG/1
20 TABLET, FILM COATED ORAL 2 TIMES DAILY
COMMUNITY

## 2022-06-03 NOTE — PROGRESS NOTES
Chief Complaint   Patient presents with    6 Month Follow-Up    Shortness of Breath         4/21/2021:    Patient is a 60 y.o. male who presents with a chief complaint of shortness of breath. Patient is followed on a regular basis by Dr. Flroence Neff MD.  Patient presents with 5 days onset of worsening shortness of breath. Denies any history of myocardial infarction, congestive heart failure or arrhythmia. Work-up in the emergency department revealed a saddle pulmonary embolism/massive central pulmonary embolism. Patient's blood pressure on presentation was 155/108 with a heart rate of 108 at rest respirations to 37, 95% saturation on 4 L nasal cannula. He denies any recent travel. Denies any history of cancer. Denies any recent lower extremity trauma. Denies any tobacco abuse. Denies any previous history of thromboembolic disease. Denies family history of thromboembolic disease. He admits to history of diabetes, hypertension and hyperlipidemia. Noted to have mildly elevated cardiac enzyme. 6/3/2021: Patient presents for initial medical evaluation. Patient is followed on a regular basis by Dr. Florence Neff MD.  Status post hospitalization secondary to saddle pulmonary embolism status post PE thrombectomy. He is walking and his shortness of breath is significant improved and breathing well. States that he is having some constipation and may be related to Xarelto. He has felt better over the last couple days after stopping Xarelto for 2 days on his own. Patient with history of diabetes, hypertension hyperlipidemia. Status post bilateral extremity venous duplex ultrasound on 4/21/2021 with DVT in the right lower extremity in the distal right femoral vein and right popliteal vein and peroneal veins. 12-3-21: hx of saddle PE, on Eliquis. No angina symptoms. Patient with history of diabetes, hypertension hyperlipidemia.   Status post bilateral extremity venous duplex ultrasound on file    Minutes of Exercise per Session: Not on file   Stress:     Feeling of Stress : Not on file   Social Connections:     Frequency of Communication with Friends and Family: Not on file    Frequency of Social Gatherings with Friends and Family: Not on file    Attends Restorationist Services: Not on file    Active Member of [address] [place] [address] or Organizations: Not on file    Attends Club or Organization Meetings: Not on file    Marital Status: Not on file   Intimate Partner Violence:     Fear of Current or Ex-Partner: Not on file    Emotionally Abused: Not on file    Physically Abused: Not on file    Sexually Abused: Not on file   Housing Stability:     Unable to Pay for Housing in the Last Year: Not on file    Number of [place] in the Last Year: Not on file    Unstable Housing in the Last Year: Not on file       Family History   Problem Relation Age of Onset    Asthma Mother     Heart Disease Father        Current Outpatient Medications   Medication Sig Dispense Refill    famotidine (PEPCID) 20 MG tablet Take 20 mg by mouth 2 times daily      metFORMIN (GLUCOPHAGE) 500 MG tablet       rivaroxaban (XARELTO) 20 MG TABS tablet Take 20 mg by mouth Daily with supper      testosterone cypionate (DEPOTESTOTERONE CYPIONATE) 200 MG/ML injection 1MLS INJECT EVERY 2 WEEKS      Esomeprazole Magnesium (NEXIUM PO) Take by mouth      testosterone enanthate (DELATESTRYL) 200 MG/ML injection 1 cc every 2 weeks 10 mL 2    SYRINGE-NEEDLE, DISP, 3 ML (B-D INTEGRA SYRINGE) 22G X 1-1/2\" 3 ML MISC 1 each by Does not apply route daily 20 each 6    metFORMIN (GLUCOPHAGE) 1000 MG tablet Take 1 tablet by mouth 2 times daily (with meals) 60 tablet 06    SITagliptin (JANUVIA) 100 MG tablet Take 1 tablet by mouth daily 30 tablet 5    blood glucose test strips (ONETOUCH VERIO) strip 1 each by In Vitro route 2 times daily As needed.  100 each 3    OneTouch Delica Lancets 44Z MISC bid 100 each 3    Blood Glucose Monitoring Suppl (ONETOUCH VERIO) w/Device KIT As directed 1 kit 1    apixaban (ELIQUIS) 5 MG TABS tablet TAKE 1 TABLET BY MOUTH 2 TIMES DAILY 60 tablet 5    Respiratory Therapy Supplies ROSALIND New Full face CPAP mask and supplies. 1 Device 0    CPAP Machine MISC by Does not apply route New CPAP with 11 cm 1 each 0    lisinopril (PRINIVIL;ZESTRIL) 10 MG tablet Take 10 mg by mouth daily       No current facility-administered medications for this visit. Quinolones    Review of Systems:  General ROS: negative  Psychological ROS: negative  Hematological and Lymphatic ROS: No history of blood clots or bleeding disorder. Respiratory ROS: no cough, shortness of breath, or wheezing  Cardiovascular ROS: no chest pain or dyspnea on exertion  Gastrointestinal ROS: no abdominal pain, change in bowel habits, or black or bloody stools  Genito-Urinary ROS: no dysuria, trouble voiding, or hematuria  Musculoskeletal ROS: negative  Neurological ROS: no TIA or stroke symptoms  Dermatological ROS: negative    VITALS:  Blood pressure 134/88, pulse 84, resp. rate 16, height 5' 11\" (1.803 m), weight 289 lb 12.8 oz (131.5 kg), SpO2 95 %. Body mass index is 40.42 kg/m². Physical Examination:  General appearance - alert, well appearing, and in no distress  Mental status - alert, oriented to person, place, and time  Neck - Neck is supple, no JVD or carotid bruits. No thyromegaly or adenopathy.    Chest - clear to auscultation, no wheezes, rales or rhonchi, symmetric air entry  Heart - normal rate, regular rhythm, normal S1, S2, no murmurs, rubs, clicks or gallops  Abdomen - soft, nontender, nondistended, no masses or organomegaly  Neurological - alert, oriented, normal speech, no focal findings or movement disorder noted  Extremities - peripheral pulses normal, no pedal edema, no clubbing or cyanosis  Skin - normal coloration and turgor, no rashes, no suspicious skin lesions noted        No orders of the defined types were placed in this encounter. ASSESSMENT:     Diagnosis Orders   1. History of DVT (deep vein thrombosis)     2. History of pulmonary embolism     3. Obesity (BMI 30-39.9)     4. ROSA on CPAP           PLAN:         As always, aggressive risk factor modification is strongly recommended. We should adhere to the JNC VIII guidelines for HTN management and the NCEP ATP III guidelines for LDL-C management. Cardiac diet is always recommended with low fat, cholesterol, calories and sodium. Continue medications at current doses. Eliuiqs 5mg BID, for life    Weight loss discussed. Has seen dietician previously. Follow up with Dr Yuan/medical weight loss. ? Bariatric surgery evaluation in future. Patient was advised and encouraged to check blood pressure at home or at a pharmacy, maintain a logbook, and also call us back if blood pressure are above the target ranges or if it is low. Patient clearly understands and agrees to the instructions. We will need to continue to monitor muscle and liver enzymes, BUN, CR, and electrolytes.

## 2022-06-04 LAB
SEX HORMONE BINDING GLOBULIN: 24 NMOL/L (ref 11–80)
TESTOSTERONE FREE-NONMALE: 200 PG/ML (ref 47–244)
TESTOSTERONE TOTAL: 752 NG/DL (ref 220–1000)

## 2022-06-15 SDOH — HEALTH STABILITY: PHYSICAL HEALTH: ON AVERAGE, HOW MANY DAYS PER WEEK DO YOU ENGAGE IN MODERATE TO STRENUOUS EXERCISE (LIKE A BRISK WALK)?: 1 DAY

## 2022-06-16 ENCOUNTER — OFFICE VISIT (OUTPATIENT)
Dept: FAMILY MEDICINE CLINIC | Age: 57
End: 2022-06-16
Payer: COMMERCIAL

## 2022-06-16 VITALS
SYSTOLIC BLOOD PRESSURE: 150 MMHG | BODY MASS INDEX: 40.6 KG/M2 | WEIGHT: 290 LBS | HEART RATE: 73 BPM | DIASTOLIC BLOOD PRESSURE: 82 MMHG | HEIGHT: 71 IN | OXYGEN SATURATION: 95 % | RESPIRATION RATE: 14 BRPM

## 2022-06-16 DIAGNOSIS — E29.1 HYPOGONADISM IN MALE: ICD-10-CM

## 2022-06-16 DIAGNOSIS — I10 ESSENTIAL HYPERTENSION: ICD-10-CM

## 2022-06-16 DIAGNOSIS — Z12.12 SCREENING FOR COLORECTAL CANCER: ICD-10-CM

## 2022-06-16 DIAGNOSIS — I82.401 DEEP VEIN THROMBOSIS (DVT) OF RIGHT LOWER EXTREMITY, UNSPECIFIED CHRONICITY, UNSPECIFIED VEIN (HCC): ICD-10-CM

## 2022-06-16 DIAGNOSIS — E11.69 TYPE 2 DIABETES MELLITUS WITH OTHER SPECIFIED COMPLICATION, UNSPECIFIED WHETHER LONG TERM INSULIN USE (HCC): Primary | ICD-10-CM

## 2022-06-16 DIAGNOSIS — I26.02 ACUTE SADDLE PULMONARY EMBOLISM WITH ACUTE COR PULMONALE (HCC): ICD-10-CM

## 2022-06-16 DIAGNOSIS — Z12.5 PROSTATE CANCER SCREENING: ICD-10-CM

## 2022-06-16 DIAGNOSIS — Z12.11 SCREENING FOR COLORECTAL CANCER: ICD-10-CM

## 2022-06-16 LAB — PROSTATE SPECIFIC ANTIGEN: 6.88 NG/ML (ref 0–4)

## 2022-06-16 PROCEDURE — 1036F TOBACCO NON-USER: CPT | Performed by: INTERNAL MEDICINE

## 2022-06-16 PROCEDURE — G8417 CALC BMI ABV UP PARAM F/U: HCPCS | Performed by: INTERNAL MEDICINE

## 2022-06-16 PROCEDURE — G8427 DOCREV CUR MEDS BY ELIG CLIN: HCPCS | Performed by: INTERNAL MEDICINE

## 2022-06-16 PROCEDURE — 3044F HG A1C LEVEL LT 7.0%: CPT | Performed by: INTERNAL MEDICINE

## 2022-06-16 PROCEDURE — 2022F DILAT RTA XM EVC RTNOPTHY: CPT | Performed by: INTERNAL MEDICINE

## 2022-06-16 PROCEDURE — 3017F COLORECTAL CA SCREEN DOC REV: CPT | Performed by: INTERNAL MEDICINE

## 2022-06-16 PROCEDURE — 99214 OFFICE O/P EST MOD 30 MIN: CPT | Performed by: INTERNAL MEDICINE

## 2022-06-16 RX ORDER — LISINOPRIL 5 MG/1
2.5 TABLET ORAL DAILY
Qty: 30 TABLET | Refills: 5 | COMMUNITY
Start: 2022-06-16 | End: 2022-09-15 | Stop reason: SDUPTHER

## 2022-06-16 SDOH — ECONOMIC STABILITY: FOOD INSECURITY: WITHIN THE PAST 12 MONTHS, YOU WORRIED THAT YOUR FOOD WOULD RUN OUT BEFORE YOU GOT MONEY TO BUY MORE.: NEVER TRUE

## 2022-06-16 SDOH — ECONOMIC STABILITY: FOOD INSECURITY: WITHIN THE PAST 12 MONTHS, THE FOOD YOU BOUGHT JUST DIDN'T LAST AND YOU DIDN'T HAVE MONEY TO GET MORE.: NEVER TRUE

## 2022-06-16 ASSESSMENT — ENCOUNTER SYMPTOMS
NAUSEA: 0
SINUS PAIN: 0
EYE DISCHARGE: 0
EYE REDNESS: 0
BACK PAIN: 0
SINUS PRESSURE: 0
VOMITING: 0
APNEA: 0
CHEST TIGHTNESS: 0
RHINORRHEA: 0
DIARRHEA: 0
EYE PAIN: 0
WHEEZING: 0
PHOTOPHOBIA: 0
RECTAL PAIN: 0
CONSTIPATION: 0
FACIAL SWELLING: 0
VOICE CHANGE: 0
TROUBLE SWALLOWING: 0
COLOR CHANGE: 0
SORE THROAT: 0
COUGH: 0
BLOOD IN STOOL: 0
EYE ITCHING: 0
ABDOMINAL PAIN: 0
SHORTNESS OF BREATH: 0
ABDOMINAL DISTENTION: 0

## 2022-06-16 ASSESSMENT — PATIENT HEALTH QUESTIONNAIRE - PHQ9
SUM OF ALL RESPONSES TO PHQ9 QUESTIONS 1 & 2: 0
SUM OF ALL RESPONSES TO PHQ QUESTIONS 1-9: 0
1. LITTLE INTEREST OR PLEASURE IN DOING THINGS: 0
SUM OF ALL RESPONSES TO PHQ QUESTIONS 1-9: 0
SUM OF ALL RESPONSES TO PHQ QUESTIONS 1-9: 0
2. FEELING DOWN, DEPRESSED OR HOPELESS: 0
SUM OF ALL RESPONSES TO PHQ QUESTIONS 1-9: 0

## 2022-06-16 ASSESSMENT — SOCIAL DETERMINANTS OF HEALTH (SDOH): HOW HARD IS IT FOR YOU TO PAY FOR THE VERY BASICS LIKE FOOD, HOUSING, MEDICAL CARE, AND HEATING?: NOT HARD AT ALL

## 2022-06-16 NOTE — PROGRESS NOTES
Subjective:      Patient ID: Rashawn Faria is a 60 y.o. male Established patient, here for evaluation of the following chief complaint(s):  Chief Complaint   Patient presents with    Established New Doctor       HPI  57-year-old diabetic with hypogonadism essential hypertension and history of pulmonary embolism presents to establish continuity with me as his primary care doctor. Hypogonadism in male: compliant with testoterone    Type 2 diabetes mellitus with other specified complication, unspecified whether long term insulin use (HCC):Januvia  And metformin 1000 mg twice daily. Acute saddle pulmonary embolism with acute cor pulmonale (HCC)/Deep vein thrombosis (DVT) of right lower extremity, unspecified chronicity, unspecified vein (HCC)-eliquis    Due for Prostate cancer screening        Mother :  [address]  Father:      At present he denies polyuria,  Polydipsia, constitutional, sinus, visual, cardiopulmonary, urologic, gastrointestinal, immunologic/hematologic, musculoskeletal, neurologic,dermatologic, or psychiatric complaints. Current Outpatient Medications on File Prior to Visit   Medication Sig Dispense Refill    lisinopril (PRINIVIL;ZESTRIL) 5 MG tablet Take 0.5 tablets by mouth daily 30 tablet 5    famotidine (PEPCID) 20 MG tablet Take 20 mg by mouth 2 times daily      testosterone cypionate (DEPOTESTOTERONE CYPIONATE) 200 MG/ML injection 1MLS INJECT EVERY 2 WEEKS      Esomeprazole Magnesium (NEXIUM PO) Take by mouth      SYRINGE-NEEDLE, DISP, 3 ML (B-D INTEGRA SYRINGE) 22G X 1-1/2\" 3 ML MISC 1 each by Does not apply route daily 20 each 6    metFORMIN (GLUCOPHAGE) 1000 MG tablet Take 1 tablet by mouth 2 times daily (with meals) 60 tablet 06    SITagliptin (JANUVIA) 100 MG tablet Take 1 tablet by mouth daily 30 tablet 5    blood glucose test strips (ONETOUCH VERIO) strip 1 each by In Vitro route 2 times daily As needed.  100 each 3    OneTouch Delica Lancets 21L MISC bid 100 each 3    Blood Glucose Monitoring Suppl (ONETOUCH VERIO) w/Device KIT As directed 1 kit 1    apixaban (ELIQUIS) 5 MG TABS tablet TAKE 1 TABLET BY MOUTH 2 TIMES DAILY 60 tablet 5    Respiratory Therapy Supplies ROSALIND New Full face CPAP mask and supplies. 1 Device 0    CPAP Machine MISC by Does not apply route New CPAP with 11 cm 1 each 0     No current facility-administered medications on file prior to visit. Quinolones    Review of Systems   Constitutional: Negative for chills, diaphoresis, fatigue and fever. HENT: Negative for congestion, dental problem, drooling, ear discharge, ear pain, facial swelling, hearing loss, mouth sores, nosebleeds, postnasal drip, rhinorrhea, sinus pressure, sinus pain, sneezing, sore throat, tinnitus, trouble swallowing and voice change. Eyes: Negative for photophobia, pain, discharge, redness, itching and visual disturbance. Respiratory: Negative for apnea, cough, chest tightness, shortness of breath and wheezing. Cardiovascular: Negative for chest pain, palpitations and leg swelling. Gastrointestinal: Negative for abdominal distention, abdominal pain, blood in stool, constipation, diarrhea, nausea, rectal pain and vomiting. Endocrine: Negative for cold intolerance, heat intolerance, polydipsia, polyphagia and polyuria. Genitourinary: Negative for decreased urine volume, difficulty urinating, dysuria, flank pain, frequency, genital sores, hematuria and urgency. Musculoskeletal: Negative for arthralgias, back pain, gait problem, joint swelling, myalgias, neck pain and neck stiffness. Skin: Negative for color change, rash and wound. Allergic/Immunologic: Negative for environmental allergies and food allergies. Neurological: Negative for dizziness, tremors, seizures, syncope, facial asymmetry, speech difficulty, weakness, light-headedness, numbness and headaches. Hematological: Negative for adenopathy. Does not bruise/bleed easily. Psychiatric/Behavioral: Negative for agitation, confusion, decreased concentration, hallucinations, self-injury, sleep disturbance and suicidal ideas. The patient is not nervous/anxious. Objective:   BP (!) 150/82   Pulse 73   Resp 14   Ht 5' 11\" (1.803 m)   Wt 290 lb (131.5 kg)   SpO2 95%   BMI 40.45 kg/m²     Physical Exam  Constitutional:       General: He is not in acute distress. Appearance: He is well-developed. HENT:      Head: Normocephalic. Right Ear: External ear normal.      Left Ear: External ear normal.   Eyes:      Conjunctiva/sclera: Conjunctivae normal.      Pupils: Pupils are equal, round, and reactive to light. Neck:      Vascular: No JVD. Trachea: No tracheal deviation. Cardiovascular:      Rate and Rhythm: Normal rate and regular rhythm. Heart sounds: Normal heart sounds. Pulmonary:      Effort: Pulmonary effort is normal. No respiratory distress. Breath sounds: Normal breath sounds. No wheezing or rales. Chest:      Chest wall: No tenderness. Abdominal:      General: Bowel sounds are normal. There is no distension. Palpations: Abdomen is soft. There is no mass. Tenderness: There is no abdominal tenderness. There is no guarding or rebound. Musculoskeletal:         General: No tenderness or deformity. Cervical back: Neck supple. Lymphadenopathy:      Cervical: No cervical adenopathy. Skin:     General: Skin is warm and dry. Capillary Refill: Capillary refill takes 2 to 3 seconds. Coloration: Skin is not pale. Findings: No erythema or rash. Neurological:      Mental Status: He is alert and oriented to person, place, and time. Cranial Nerves: No cranial nerve deficit. Sensory: No sensory deficit. Motor: No abnormal muscle tone. Coordination: Coordination normal.      Deep Tendon Reflexes: Reflexes normal.   Psychiatric:         Thought Content:  Thought content normal.         Judgment: Judgment normal.         Assessment:       Diagnosis Orders   1. Hypogonadism in male     2. Type 2 diabetes mellitus with other specified complication, unspecified whether long term insulin use ([address]     3. Acute saddle pulmonary embolism with acute cor pulmonale (HCC)     4. Deep vein thrombosis (DVT) of right lower extremity, unspecified chronicity, unspecified vein (HCC)     5. Prostate cancer screening  PSA Screening        No results found for: LIPIDPAN, BMP, CMP, CBC, CBCAUTODIF  Plan:      Walter Shone was seen today for established new doctor. Diagnoses and all orders for this visit:    Hypogonadism in male: Testosterone replacemt via injection    Type 2 diabetes mellitus with other specified complication, unspecified whether long term insulin use (HCC)-continue Glucophage and Januvia     Acute saddle pulmonary embolism with acute cor pulmonale (HCC)-continue Eliquis    Deep vein thrombosis (DVT) of right lower extremity, unspecified chronicity, unspecified vein (HCC)    Prostate cancer screening  -     PSA Screening; Future     Acid reflux : nexium    No follow-ups on file. On this date 06/16/22 I have spent  minutes reviewing previous notes, test results and face to face with the patient discussing the diagnosis and importance of compliance with the treatment plan. Fide Subramanian MD    Please note, this report has been partially produced using speech recognition software  and may cause  and /or contain errors related to that system including grammar, punctuation and spelling as well as words and phrases that may seem inappropriate. If there are questions or concerns please feel free to contact me to clarify.

## 2022-06-17 DIAGNOSIS — R97.20 ELEVATED PSA: Primary | ICD-10-CM

## 2022-07-04 LAB — NONINV COLON CA DNA+OCC BLD SCRN STL QL: NEGATIVE

## 2022-07-19 ENCOUNTER — OFFICE VISIT (OUTPATIENT)
Dept: UROLOGY | Age: 57
End: 2022-07-19
Payer: COMMERCIAL

## 2022-07-19 VITALS
WEIGHT: 290 LBS | HEIGHT: 71 IN | BODY MASS INDEX: 40.6 KG/M2 | HEART RATE: 85 BPM | OXYGEN SATURATION: 98 % | DIASTOLIC BLOOD PRESSURE: 76 MMHG | SYSTOLIC BLOOD PRESSURE: 138 MMHG

## 2022-07-19 DIAGNOSIS — R97.20 ELEVATED PSA: Primary | ICD-10-CM

## 2022-07-19 PROCEDURE — 1036F TOBACCO NON-USER: CPT | Performed by: UROLOGY

## 2022-07-19 PROCEDURE — 3017F COLORECTAL CA SCREEN DOC REV: CPT | Performed by: UROLOGY

## 2022-07-19 PROCEDURE — G8427 DOCREV CUR MEDS BY ELIG CLIN: HCPCS | Performed by: UROLOGY

## 2022-07-19 PROCEDURE — 99203 OFFICE O/P NEW LOW 30 MIN: CPT | Performed by: UROLOGY

## 2022-07-19 PROCEDURE — G8417 CALC BMI ABV UP PARAM F/U: HCPCS | Performed by: UROLOGY

## 2022-07-19 NOTE — PROGRESS NOTES
[facility] EVALUATION NOTE                                                 H&P                                                                                                                                                 Reason for Visit  PSA of 6.88    History of Present Illness  33-year-old male with history of prostatitis treated in the past by urologist probably more than 30 years ago with few courses of antibiotics  Patient had his PSAs checked at [facility] and it was as low as 3.04 years ago  Patient claims that there was one PSA which we cannot find it was around 4.51-year ago  Current PSA is at 6.88 although patient admits to having ejaculated night before the PSA test  Patient has minimal voiding symptoms  Past medical history includes diabetes, DVT for which she is on chronic anticoagulation with Eliquis      Urologic Review of Systems/Symptoms  Denies hematuria    Review of Systems  Hospitalization: None recent  All 14 categories of Review of Systems otherwise reviewed no other findings reported.   On anticoagulation  Past Medical History:   Diagnosis Date    Abnormal EKG 12/3/2021    Chronic back pain     Diabetes (HCC)     GERD (gastroesophageal reflux disease)     High cholesterol     History of DVT (deep vein thrombosis) 6/3/2021    History of pulmonary embolism 6/3/2021    Hypertension     IBS (irritable bowel syndrome)     Kidney, malrotation     Sleep apnea      Past Surgical History:   Procedure Laterality Date    PULMONARY EMBOLISM SURGERY  2021     Social History     Socioeconomic History    Marital status: Single   Tobacco Use    Smoking status: Never    Smokeless tobacco: Never   Vaping Use    Vaping Use: Never used   Substance and Sexual Activity    Alcohol use: No    Drug use: No     Social Determinants of Health     Financial Resource Strain: Low Risk     Difficulty of Paying Living Expenses: Not hard at all   Food Insecurity: No Food Insecurity    Worried About Running Out of Food in the Last Year: Never true    Ran Out of Food in the Last Year: Never true   Physical Activity: Unknown    Days of Exercise per Week: 1 day   Intimate Partner Violence: Not At Risk    Fear of Current or Ex-Partner: No    Emotionally Abused: No    Physically Abused: No    Sexually Abused: No     Family History   Problem Relation Age of Onset    Asthma Mother     Heart Disease Father      Current Outpatient Medications   Medication Sig Dispense Refill    lisinopril (PRINIVIL;ZESTRIL) 5 MG tablet Take 0.5 tablets by mouth daily 30 tablet 5    famotidine (PEPCID) 20 MG tablet Take 20 mg by mouth 2 times daily      testosterone cypionate (DEPOTESTOTERONE CYPIONATE) 200 MG/ML injection 1MLS INJECT EVERY 2 WEEKS      Esomeprazole Magnesium (NEXIUM PO) Take by mouth      SYRINGE-NEEDLE, DISP, 3 ML (B-D INTEGRA SYRINGE) 22G X 1-1/2\" 3 ML MISC 1 each by Does not apply route daily 20 each 6    metFORMIN (GLUCOPHAGE) 1000 MG tablet Take 1 tablet by mouth 2 times daily (with meals) 60 tablet 06    SITagliptin (JANUVIA) 100 MG tablet Take 1 tablet by mouth daily 30 tablet 5    blood glucose test strips (ONETOUCH VERIO) strip 1 each by In Vitro route 2 times daily As needed. 100 each 3    OneTouch Delica Lancets 97Z MISC bid 100 each 3    Blood Glucose Monitoring Suppl (ONETOUCH VERIO) w/Device KIT As directed 1 kit 1    apixaban (ELIQUIS) 5 MG TABS tablet TAKE 1 TABLET BY MOUTH 2 TIMES DAILY 60 tablet 5    Respiratory Therapy Supplies ROSALIND New Full face CPAP mask and supplies. 1 Device 0    CPAP Machine MISC by Does not apply route New CPAP with 11 cm 1 each 0     No current facility-administered medications for this visit. Quinolones  All reviewed and verified by Dr Jose Garcia on today's visit    PSA   Date Value Ref Range Status   06/16/2022 6.88 (H) 0.00 - 4.00 ng/mL Final     Comment:     When the Total PSA is between 3.00 and 10.00 ng/mL, consider  requesting a Free PSA to aid in diagnosis.        No results found for this visit on 07/19/22. Physical Exam  Vitals:    07/19/22 1311   BP: 138/76   Pulse: 85   SpO2: 98%   Weight: 290 lb (131.5 kg)   Height: 5' 11\" (1.803 m)     Constitutional: Not in distress. Cardiovascular: Normal rate, BP reviewed. Normal rhythm  Pulmonary/Chest: Normal respiratory effort not short of breath  Abdominal: Not distended. No hernias large abdominal pannus  Urologic Exam  Uncircumcised penis normal meatus no evidence of balanitis  Testis within normal limits mildly atrophic  Normal rectal tone 30 g prostate no nodules felt  Exam consistent with BPH. Musculoskeletal: Ambulatory. Extremities: No edema  Neurological: No deficits  Lymphatics: No gross lymphadenopathy  Psychiatric: Alert oriented x3 normal affect. Assessment/Medical Necessity-Decision Making  PSA of 6.88  Prostate exam normal consistent with BPH  Plan  Repeat PSA in 2 weeks follow-up thereafter for reexam and reevaluation  Greater than 50% of 30 minutes spent consulting patient face-to-face  Orders Placed This Encounter   Procedures    PSA, Diagnostic     Standing Status:   Future     Standing Expiration Date:   7/19/2023     No orders of the defined types were placed in this encounter. Riana Hare MD       Please note this report has been partially produced using speech recognition software  And may cause contain errors related to that system including grammar, punctuation and spelling as well as words and phrases that may seem inappropriate. If there are questions or concerns please feel free to contact me to clarify.

## 2022-08-04 DIAGNOSIS — R97.20 ELEVATED PSA: ICD-10-CM

## 2022-08-04 LAB — PROSTATE SPECIFIC ANTIGEN: 6.41 NG/ML (ref 0–4)

## 2022-08-09 ENCOUNTER — OFFICE VISIT (OUTPATIENT)
Dept: UROLOGY | Age: 57
End: 2022-08-09
Payer: COMMERCIAL

## 2022-08-09 VITALS
BODY MASS INDEX: 40.6 KG/M2 | OXYGEN SATURATION: 97 % | DIASTOLIC BLOOD PRESSURE: 80 MMHG | HEART RATE: 87 BPM | WEIGHT: 290 LBS | SYSTOLIC BLOOD PRESSURE: 142 MMHG | HEIGHT: 71 IN

## 2022-08-09 DIAGNOSIS — R97.20 ELEVATED PSA: Primary | ICD-10-CM

## 2022-08-09 PROCEDURE — 99212 OFFICE O/P EST SF 10 MIN: CPT | Performed by: UROLOGY

## 2022-08-09 PROCEDURE — 1036F TOBACCO NON-USER: CPT | Performed by: UROLOGY

## 2022-08-09 PROCEDURE — G8427 DOCREV CUR MEDS BY ELIG CLIN: HCPCS | Performed by: UROLOGY

## 2022-08-09 PROCEDURE — G8417 CALC BMI ABV UP PARAM F/U: HCPCS | Performed by: UROLOGY

## 2022-08-09 PROCEDURE — 3017F COLORECTAL CA SCREEN DOC REV: CPT | Performed by: UROLOGY

## 2022-08-09 NOTE — PROGRESS NOTES
[facility] EVALUATION NOTE                                                 H&P                                                                                                                                                 Reason for Visit  Elevated PSA    History of Present Illness  47-year-old male with history of elevated PSA of 6.8 8 repeat PSA was 6.41  I strongly recommend a prostate biopsy  Patient wants to hold off for another 3 months due to some personal issues      Urologic Review of Systems/Symptoms  Unchanged    Review of Systems  Hospitalization: None recent  All 14 categories of Review of Systems otherwise reviewed no other findings reported.   Unchanged since last visit  Past Medical History:   Diagnosis Date    Abnormal EKG 12/3/2021    Chronic back pain     Diabetes (HCC)     GERD (gastroesophageal reflux disease)     High cholesterol     History of DVT (deep vein thrombosis) 6/3/2021    History of pulmonary embolism 6/3/2021    Hypertension     IBS (irritable bowel syndrome)     Kidney, malrotation     Sleep apnea      Past Surgical History:   Procedure Laterality Date    PULMONARY EMBOLISM SURGERY  2021     Social History     Socioeconomic History    Marital status: Single     Spouse name: None    Number of children: None    Years of education: None    Highest education level: None   Tobacco Use    Smoking status: Never    Smokeless tobacco: Never   Vaping Use    Vaping Use: Never used   Substance and Sexual Activity    Alcohol use: No    Drug use: No     Social Determinants of Health     Financial Resource Strain: Low Risk     Difficulty of Paying Living Expenses: Not hard at all   Food Insecurity: No Food Insecurity    Worried About Running Out of Food in the Last Year: Never true    Ran Out of Food in the Last Year: Never true   Physical Activity: Unknown    Days of Exercise per Week: 1 day   Intimate Partner Violence: Not At Risk    Fear of Current or Ex-Partner: No    Emotionally

## 2022-08-10 NOTE — TELEPHONE ENCOUNTER
Please approve or deny this refill request. The order is pended. Thank you.     LOV 6/3/2022    Next Visit Date:  Future Appointments   Date Time Provider Department Center   8/18/2022  1:15 PM [address] , [address] [facility],[address]   8/26/2022  1:30 PM Clive Duncan  [facility] [address]   9/15/2022  1:45 PM Edwin Souza  [place] [address]   11/8/2022  1:30 PM Jayla Sandoval MD [facility]   6/15/2023  1:00 PM Albert Hurley,  [facility]

## 2022-08-18 ENCOUNTER — OFFICE VISIT (OUTPATIENT)
Dept: PULMONOLOGY | Age: 57
End: 2022-08-18
Payer: COMMERCIAL

## 2022-08-18 VITALS
HEART RATE: 84 BPM | HEIGHT: 71 IN | WEIGHT: 288 LBS | SYSTOLIC BLOOD PRESSURE: 131 MMHG | BODY MASS INDEX: 40.32 KG/M2 | TEMPERATURE: 97.9 F | OXYGEN SATURATION: 96 % | DIASTOLIC BLOOD PRESSURE: 74 MMHG

## 2022-08-18 DIAGNOSIS — I82.401 DEEP VEIN THROMBOSIS (DVT) OF RIGHT LOWER EXTREMITY, UNSPECIFIED CHRONICITY, UNSPECIFIED VEIN (HCC): ICD-10-CM

## 2022-08-18 DIAGNOSIS — Z99.89 OSA ON CPAP: Primary | ICD-10-CM

## 2022-08-18 DIAGNOSIS — I26.02 ACUTE SADDLE PULMONARY EMBOLISM WITH ACUTE COR PULMONALE (HCC): ICD-10-CM

## 2022-08-18 DIAGNOSIS — G47.33 OSA ON CPAP: Primary | ICD-10-CM

## 2022-08-18 DIAGNOSIS — E66.9 OBESITY (BMI 30-39.9): ICD-10-CM

## 2022-08-18 PROCEDURE — G8417 CALC BMI ABV UP PARAM F/U: HCPCS | Performed by: INTERNAL MEDICINE

## 2022-08-18 PROCEDURE — 3017F COLORECTAL CA SCREEN DOC REV: CPT | Performed by: INTERNAL MEDICINE

## 2022-08-18 PROCEDURE — 99214 OFFICE O/P EST MOD 30 MIN: CPT | Performed by: INTERNAL MEDICINE

## 2022-08-18 PROCEDURE — G8427 DOCREV CUR MEDS BY ELIG CLIN: HCPCS | Performed by: INTERNAL MEDICINE

## 2022-08-18 PROCEDURE — 1036F TOBACCO NON-USER: CPT | Performed by: INTERNAL MEDICINE

## 2022-08-18 ASSESSMENT — ENCOUNTER SYMPTOMS
NAUSEA: 0
VOICE CHANGE: 0
WHEEZING: 0
COUGH: 0
VOMITING: 0
RHINORRHEA: 0
SHORTNESS OF BREATH: 0
DIARRHEA: 0
CHEST TIGHTNESS: 0
SORE THROAT: 0
ABDOMINAL PAIN: 0
EYE ITCHING: 0

## 2022-08-18 NOTE — PROGRESS NOTES
Subjective:     Phuong Gonzalez is a 60 y.o. male who complains today of:     Chief Complaint   Patient presents with    Follow-up     4m f/u - ROSA       HPI  He is using CPAP with  11 centimeters of H2O with heated humidity. He is using CPAP for about  7-8 hours every night. He is using CPAP with Nasal  Mask. He said  sleep is restful with the CPAP use. He is compliant with CPAP therapy and benefiting with CPAP use. No snoring with CPAP use. No complaint of daytime sleepiness or tiredness with CPAP use. He denies taking naps. No sleepiness with driving. He denies difficulty falling asleep or staying asleep. I reviewed compliance report with patient regarding CPAP therapy. He is using  CPAP for 30 days out of 30 days  Average usage of days used is 7 hours and 57 min , average AHI 2.0  with CPAP use. He had a PE thrombectomy done and significant amount of blood clot was pulled out. He also had DVT rt. Leg . he is on Eliquis.       Allergies:  Quinolones  Past Medical History:   Diagnosis Date    Abnormal EKG 12/3/2021    Chronic back pain     Diabetes (HCC)     GERD (gastroesophageal reflux disease)     High cholesterol     History of DVT (deep vein thrombosis) 6/3/2021    History of pulmonary embolism 6/3/2021    Hypertension     IBS (irritable bowel syndrome)     Kidney, malrotation     Sleep apnea      Past Surgical History:   Procedure Laterality Date    PULMONARY EMBOLISM SURGERY  2021     Family History   Problem Relation Age of Onset    Asthma Mother     Heart Disease Father      Social History     Socioeconomic History    Marital status: Single     Spouse name: Not on file    Number of children: Not on file    Years of education: Not on file    Highest education level: Not on file   Occupational History    Not on file   Tobacco Use    Smoking status: Never     Passive exposure: Never    Smokeless tobacco: Never   Vaping Use    Vaping Use: Never used   Substance and Sexual Activity Alcohol use: No    Drug use: No    Sexual activity: Yes   Other Topics Concern    Not on file   Social History Narrative    Not on file     Social Determinants of Health     Financial Resource Strain: Low Risk     Difficulty of Paying Living Expenses: Not hard at all   Food Insecurity: No Food Insecurity    Worried About Running Out of Food in the Last Year: Never true    Ran Out of Food in the Last Year: Never true   Transportation Needs: Not on file   Physical Activity: Unknown    Days of Exercise per Week: 1 day    Minutes of Exercise per Session: Not on file   Stress: Not on file   Social Connections: Not on file   Intimate Partner Violence: Not At Risk    Fear of Current or Ex-Partner: No    Emotionally Abused: No    Physically Abused: No    Sexually Abused: No   Housing Stability: Not on file         Review of Systems   Constitutional:  Negative for chills, diaphoresis, fatigue and fever. HENT:  Negative for congestion, mouth sores, nosebleeds, postnasal drip, rhinorrhea, sneezing, sore throat and voice change. Eyes:  Negative for itching and visual disturbance. Respiratory:  Negative for cough, chest tightness, shortness of breath and wheezing. Cardiovascular: Negative. Negative for chest pain, palpitations and leg swelling. Gastrointestinal:  Negative for abdominal pain, diarrhea, nausea and vomiting. Genitourinary:  Negative for difficulty urinating and hematuria. Musculoskeletal:  Negative for arthralgias, joint swelling and myalgias. Skin:  Negative for rash. Allergic/Immunologic: Negative for environmental allergies. Neurological:  Negative for dizziness, tremors, weakness and headaches. Psychiatric/Behavioral:  Positive for sleep disturbance. Negative for behavioral problems.         :     Vitals:    08/18/22 1331 08/18/22 1336   BP: (!) 140/74 131/74   Site: Left Upper Arm Left Upper Arm   Position: Sitting Sitting   Cuff Size: Medium Adult Large Adult   Pulse: 84    Temp: 97.9 °F (36.6 °C)    TempSrc: Temporal    SpO2: 96%    Weight: 288 lb (130.6 kg)    Height: 5' 11\" (1.803 m)      Wt Readings from Last 3 Encounters:   08/18/22 288 lb (130.6 kg)   08/09/22 290 lb (131.5 kg)   07/19/22 290 lb (131.5 kg)         Physical Exam  Constitutional:       Appearance: He is well-developed. HENT:      Head: Normocephalic and atraumatic. Nose: Nose normal.   Eyes:      Conjunctiva/sclera: Conjunctivae normal.      Pupils: Pupils are equal, round, and reactive to light. Neck:      Thyroid: No thyromegaly. Vascular: No JVD. Trachea: No tracheal deviation. Cardiovascular:      Rate and Rhythm: Normal rate and regular rhythm. Heart sounds: No murmur heard. No friction rub. No gallop. Pulmonary:      Effort: Pulmonary effort is normal. No respiratory distress. Breath sounds: Normal breath sounds. No wheezing or rales. Chest:      Chest wall: No tenderness. Abdominal:      General: There is no distension. Musculoskeletal:         General: Normal range of motion. Lymphadenopathy:      Cervical: No cervical adenopathy. Skin:     General: Skin is warm and dry. Findings: No rash. Neurological:      Mental Status: He is alert and oriented to person, place, and time. Cranial Nerves: No cranial nerve deficit.    Psychiatric:         Behavior: Behavior normal.       Current Outpatient Medications   Medication Sig Dispense Refill    apixaban (ELIQUIS) 5 MG TABS tablet TAKE 1 TABLET BY MOUTH 2 TIMES DAILY 60 tablet 5    lisinopril (PRINIVIL;ZESTRIL) 5 MG tablet Take 0.5 tablets by mouth daily 30 tablet 5    famotidine (PEPCID) 20 MG tablet Take 20 mg by mouth 2 times daily      testosterone cypionate (DEPOTESTOTERONE CYPIONATE) 200 MG/ML injection       Esomeprazole Magnesium (NEXIUM PO) Take by mouth      SYRINGE-NEEDLE, DISP, 3 ML (B-D INTEGRA SYRINGE) 22G X 1-1/2\" 3 ML MISC 1 each by Does not apply route daily 20 each 6    metFORMIN (GLUCOPHAGE) 1000 MG tablet Take 1 tablet by mouth 2 times daily (with meals) 60 tablet 06    SITagliptin (JANUVIA) 100 MG tablet Take 1 tablet by mouth daily 30 tablet 5    blood glucose test strips (ONETOUCH VERIO) strip 1 each by In Vitro route 2 times daily As needed. 100 each 3    OneTouch Delica Lancets 55V MISC bid 100 each 3    Blood Glucose Monitoring Suppl (Fahad Fang) w/Device KIT As directed 1 kit 1    Respiratory Therapy Supplies ROSALIND New Full face CPAP mask and supplies. 1 Device 0    CPAP Machine MISC by Does not apply route New CPAP with 11 cm 1 each 0     No current facility-administered medications for this visit. No results found for this or any previous visit.  ]  Results for orders placed during the hospital encounter of 04/21/21    XR CHEST PORTABLE    Narrative  Exam: XR CHEST PORTABLE    History:  sob    Technique: AP portable view of the chest obtained. Comparison: Chest x-ray from May 21, 2013    Chest x-ray portable  Findings: The cardiomediastinal silhouette is within normal limits. There are no infiltrates, consolidations or effusions. Bones of the thorax appear intact. Impression  No radiographic evidence of acute intrathoracic process. Assessment/Plan:     1. ROSA on CPAP  He is using CPAP with  11 centimeters of H2O with heated humidity. He is using CPAP for about  7-8 hours every night. He is using CPAP with Nasal  Mask. He said  sleep is restful with the CPAP use. He is compliant with CPAP therapy and benefiting with CPAP use. No snoring with CPAP use. Continue CPAP as before     I reviewed compliance report with patient regarding CPAP therapy. He is using  CPAP for 30 days out of 30 days  Average usage of days used is 7 hours and 57 min , average AHI 2.0 with CPAP use. Counseling: CPAP/BiPAP uses, He advised to use CPAP at least 5-6 hours every night.     Driving: He is advised for extreme caution when driving or operating machinery if there is a feeling of drowsiness, especially while driving it is preferable to stop driving and take a brief nap. Sleep hygiene:Avoid supine sleep, sleep on  sides. Avoid  sleep deprivation. Explained sleep hygiene. Advice to avoid Alcohol and sedative. 2. Obesity (BMI 30-39. 9)  He is advised try to lose weight. obesity related risk explained to the patient ,  Current weight:  288 lb (130.6 kg) Lbs. BMI:  Body mass index is 40.17 kg/m². Suggested weight control approaches, including dietary changes , exercise, behavioral modification. 3. Acute saddle pulmonary embolism with acute cor pulmonale (HCC) s/p thrombectomy  He had a PE thrombectomy done and significant amount of blood clot was pulled out. He also had DVT rt. Leg . he is on Eliquis. 4. Deep vein thrombosis (DVT) of right lower extremity, unspecified chronicity, unspecified vein (HCC)  Currently is on Eliquis. Return in about 4 months (around 12/18/2022) for jose.       Lakeisha Ford MD

## 2022-08-25 ENCOUNTER — TELEPHONE (OUTPATIENT)
Dept: ENDOCRINOLOGY | Age: 57
End: 2022-08-25

## 2022-08-26 ENCOUNTER — OFFICE VISIT (OUTPATIENT)
Dept: ENDOCRINOLOGY | Age: 57
End: 2022-08-26
Payer: COMMERCIAL

## 2022-08-26 VITALS
BODY MASS INDEX: 39.48 KG/M2 | DIASTOLIC BLOOD PRESSURE: 85 MMHG | SYSTOLIC BLOOD PRESSURE: 135 MMHG | HEART RATE: 72 BPM | HEIGHT: 71 IN | WEIGHT: 282 LBS | OXYGEN SATURATION: 95 %

## 2022-08-26 DIAGNOSIS — E66.01 MORBID OBESITY (HCC): ICD-10-CM

## 2022-08-26 DIAGNOSIS — E29.1 HYPOGONADISM IN MALE: ICD-10-CM

## 2022-08-26 DIAGNOSIS — E11.69 TYPE 2 DIABETES MELLITUS WITH OTHER SPECIFIED COMPLICATION, UNSPECIFIED WHETHER LONG TERM INSULIN USE (HCC): Primary | ICD-10-CM

## 2022-08-26 LAB
CHP ED QC CHECK: NORMAL
GLUCOSE BLD-MCNC: 109 MG/DL

## 2022-08-26 PROCEDURE — 99213 OFFICE O/P EST LOW 20 MIN: CPT | Performed by: INTERNAL MEDICINE

## 2022-08-26 PROCEDURE — G8427 DOCREV CUR MEDS BY ELIG CLIN: HCPCS | Performed by: INTERNAL MEDICINE

## 2022-08-26 PROCEDURE — 1036F TOBACCO NON-USER: CPT | Performed by: INTERNAL MEDICINE

## 2022-08-26 PROCEDURE — 82962 GLUCOSE BLOOD TEST: CPT | Performed by: INTERNAL MEDICINE

## 2022-08-26 PROCEDURE — 2022F DILAT RTA XM EVC RTNOPTHY: CPT | Performed by: INTERNAL MEDICINE

## 2022-08-26 PROCEDURE — 3044F HG A1C LEVEL LT 7.0%: CPT | Performed by: INTERNAL MEDICINE

## 2022-08-26 PROCEDURE — G8417 CALC BMI ABV UP PARAM F/U: HCPCS | Performed by: INTERNAL MEDICINE

## 2022-08-26 PROCEDURE — 3017F COLORECTAL CA SCREEN DOC REV: CPT | Performed by: INTERNAL MEDICINE

## 2022-08-26 RX ORDER — NEEDLES, FILTER 19GX1 1/2"
1 NEEDLE, DISPOSABLE MISCELLANEOUS DAILY
Qty: 20 EACH | Refills: 6 | Status: SHIPPED | OUTPATIENT
Start: 2022-08-26

## 2022-08-26 RX ORDER — BLOOD SUGAR DIAGNOSTIC
1 STRIP MISCELLANEOUS 2 TIMES DAILY
Qty: 100 EACH | Refills: 3 | Status: SHIPPED | OUTPATIENT
Start: 2022-08-26

## 2022-08-26 RX ORDER — LANCETS 33 GAUGE
EACH MISCELLANEOUS
Qty: 100 EACH | Refills: 3 | Status: SHIPPED | OUTPATIENT
Start: 2022-08-26 | End: 2022-10-05

## 2022-08-26 NOTE — PROGRESS NOTES
2022    Assessment:       Diagnosis Orders   1. Type 2 diabetes mellitus with other specified complication, unspecified whether long term insulin use (HCC)  POCT Glucose    Basic Metabolic Panel    Hemoglobin A1C    SYRINGE-NEEDLE, DISP, 3 ML (B-D INTEGRA SYRINGE) 22G X 1-1/2\" 3 ML MISC    SITagliptin (JANUVIA) 100 MG tablet    OneTouch Delica Lancets 87G MISC    metFORMIN (GLUCOPHAGE) 1000 MG tablet    blood glucose test strips (ONETOUCH VERIO) strip      2. Hypogonadism in male        3. Morbid obesity ([address]              PLAN:     Orders Placed This Encounter   Medications    SYRINGE-NEEDLE, DISP, 3 ML (B-D INTEGRA SYRINGE) 22G X 1-1/2\" 3 ML MISC     Si each by Does not apply route daily     Dispense:  20 each     Refill:  6    SITagliptin (JANUVIA) 100 MG tablet     Sig: Take 1 tablet by mouth daily     Dispense:  30 tablet     Refill:  5    OneTouch Delica Lancets 46R MISC     Sig: bid     Dispense:  100 each     Refill:  3    metFORMIN (GLUCOPHAGE) 1000 MG tablet     Sig: Take 1 tablet by mouth 2 times daily (with meals)     Dispense:  60 tablet     Refill:  06    blood glucose test strips (ONETOUCH VERIO) strip     Si each by In Vitro route 2 times daily As needed. Dispense:  100 each     Refill:  3     Continue current medication regimen for diabetes would recommend to hold off testosterone  Repeat PSA  Follow-up in 3 months  Patient also to look at getting prostate biopsy  [facility] report reviewed      Orders Placed This Encounter   Procedures    Basic Metabolic Panel     Standing Status:   Future     Standing Expiration Date:   2023    Hemoglobin A1C     Standing Status:   Future     Standing Expiration Date:   2023    Testosterone, free, total     Standing Status:   Future     Standing Expiration Date:   2023    POCT Glucose         Orders Placed This Encounter   Procedures    POCT Glucose     No orders of the defined types were placed in this encounter.     No follow-ups on file.  Subjective:     Chief Complaint   Patient presents with    Diabetes     Vitals:    08/26/22 1323 08/26/22 1329   BP: (!) 142/84 135/85   Site: Left Upper Arm Right Upper Arm   Position: Sitting Sitting   Cuff Size: Large Adult Large Adult   Pulse: 72    SpO2: 95%    Weight: 282 lb (127.9 kg)    Height: 5' 11\" (1.803 m)      Wt Readings from Last 3 Encounters:   08/26/22 282 lb (127.9 kg)   08/18/22 288 lb (130.6 kg)   08/09/22 290 lb (131.5 kg)     BP Readings from Last 3 Encounters:   08/26/22 135/85   08/18/22 131/74   08/09/22 (!) 142/80     Follow-up on type 2 diabetes obesity history of hypogonadism on testosterone injections testosterone level have been stable PSAs have been more than 6 has been seen by urologist has been recommended to get prostate biopsy  PSA has been between 6-7 stable  Morbid obesity BMI at 39    Diabetes  He presents for his follow-up diabetic visit. His disease course has been stable. Pertinent negatives for hypoglycemia include no dizziness or tremors. Associated symptoms include fatigue. Pertinent negatives for diabetes include no chest pain and no weakness. Risk factors for coronary artery disease include obesity. Current diabetic treatment includes oral agent (dual therapy). His overall blood glucose range is 130-140 mg/dl.    Past Medical History:   Diagnosis Date    Abnormal EKG 12/3/2021    Chronic back pain     Diabetes (HCC)     GERD (gastroesophageal reflux disease)     High cholesterol     History of DVT (deep vein thrombosis) 6/3/2021    History of pulmonary embolism 6/3/2021    Hypertension     IBS (irritable bowel syndrome)     Kidney, malrotation     Sleep apnea      Past Surgical History:   Procedure Laterality Date    PULMONARY EMBOLISM SURGERY  2021     Social History     Socioeconomic History    Marital status: Single     Spouse name: Not on file    Number of children: Not on file    Years of education: Not on file    Highest education level: Not on file 1 tablet by mouth daily, Disp: 30 tablet, Rfl: 5    blood glucose test strips (ONETOUCH VERIO) strip, 1 each by In Vitro route 2 times daily As needed. , Disp: 100 each, Rfl: 3    OneTouch Delica Lancets 88M MISC, bid, Disp: 100 each, Rfl: 3    Blood Glucose Monitoring Suppl (Enedina Nava) w/Device KIT, As directed, Disp: 1 kit, Rfl: 1    Respiratory Therapy Supplies ROSALIND, New Full face CPAP mask and supplies. , Disp: 1 Device, Rfl: 0    CPAP Machine MISC, by Does not apply route New CPAP with 11 cm, Disp: 1 each, Rfl: 0  Lab Results   Component Value Date     06/03/2022    K 4.3 06/03/2022     06/03/2022    CO2 23 06/03/2022    BUN 19 06/03/2022    CREATININE 0.80 06/03/2022    GLUCOSE 109 08/26/2022    CALCIUM 9.3 06/03/2022    PROT 7.8 04/21/2021    LABALBU 4.3 04/21/2021    BILITOT 0.7 04/21/2021    ALKPHOS 77 04/21/2021    AST 20 04/21/2021    ALT 35 04/21/2021    LABGLOM >60.0 06/03/2022    GFRAA >60.0 06/03/2022    GLOB 3.5 04/21/2021     Lab Results   Component Value Date    WBC 11.2 (H) 04/22/2021    HGB 13.6 (L) 04/22/2021    HCT 41.9 (L) 04/22/2021    MCV 92.5 04/22/2021     04/22/2021     Lab Results   Component Value Date    LABA1C 6.7 (H) 06/03/2022    LABA1C 6.6 04/29/2022    LABA1C 6.2 (H) 11/01/2014     Lab Results   Component Value Date    CHOLFAST 196 06/03/2022    TRIGLYCFAST 193 (H) 06/03/2022    HDL 31 (L) 06/03/2022    HDL 28 (L) 04/22/2021    HDL 35 (L) 05/21/2013    LDLCALC 126 06/03/2022    LDLCALC 106 04/22/2021    LDLCALC 126 05/21/2013    CHOL 169 04/22/2021    CHOL 264 (H) 05/21/2013    TRIG 173 (H) 04/22/2021    TRIG 516 (H) 05/21/2013     No results found for: TESTM  Lab Results   Component Value Date    TSH 0.863 05/21/2013     No results found for: TPOABS    Review of Systems   Constitutional:  Positive for fatigue. Negative for appetite change. Eyes: Negative. Cardiovascular:  Negative for chest pain, palpitations and leg swelling.    Endocrine: Negative for

## 2022-09-04 ASSESSMENT — ENCOUNTER SYMPTOMS
EYES NEGATIVE: 1
COLOR CHANGE: 0

## 2022-09-15 ENCOUNTER — OFFICE VISIT (OUTPATIENT)
Dept: FAMILY MEDICINE CLINIC | Age: 57
End: 2022-09-15
Payer: COMMERCIAL

## 2022-09-15 VITALS
SYSTOLIC BLOOD PRESSURE: 122 MMHG | BODY MASS INDEX: 39.62 KG/M2 | RESPIRATION RATE: 14 BRPM | DIASTOLIC BLOOD PRESSURE: 76 MMHG | WEIGHT: 283 LBS | OXYGEN SATURATION: 96 % | HEART RATE: 72 BPM | HEIGHT: 71 IN

## 2022-09-15 DIAGNOSIS — E11.69 TYPE 2 DIABETES MELLITUS WITH OTHER SPECIFIED COMPLICATION, UNSPECIFIED WHETHER LONG TERM INSULIN USE (HCC): Primary | ICD-10-CM

## 2022-09-15 DIAGNOSIS — I26.02 ACUTE SADDLE PULMONARY EMBOLISM WITH ACUTE COR PULMONALE (HCC): ICD-10-CM

## 2022-09-15 DIAGNOSIS — G47.33 OSA ON CPAP: ICD-10-CM

## 2022-09-15 DIAGNOSIS — I82.401 DEEP VEIN THROMBOSIS (DVT) OF RIGHT LOWER EXTREMITY, UNSPECIFIED CHRONICITY, UNSPECIFIED VEIN (HCC): ICD-10-CM

## 2022-09-15 DIAGNOSIS — Z99.89 OSA ON CPAP: ICD-10-CM

## 2022-09-15 LAB — HBA1C MFR BLD: 5.9 %

## 2022-09-15 PROCEDURE — 99214 OFFICE O/P EST MOD 30 MIN: CPT | Performed by: INTERNAL MEDICINE

## 2022-09-15 PROCEDURE — 2022F DILAT RTA XM EVC RTNOPTHY: CPT | Performed by: INTERNAL MEDICINE

## 2022-09-15 PROCEDURE — 1036F TOBACCO NON-USER: CPT | Performed by: INTERNAL MEDICINE

## 2022-09-15 PROCEDURE — 3044F HG A1C LEVEL LT 7.0%: CPT | Performed by: INTERNAL MEDICINE

## 2022-09-15 PROCEDURE — G8417 CALC BMI ABV UP PARAM F/U: HCPCS | Performed by: INTERNAL MEDICINE

## 2022-09-15 PROCEDURE — 3017F COLORECTAL CA SCREEN DOC REV: CPT | Performed by: INTERNAL MEDICINE

## 2022-09-15 PROCEDURE — 83036 HEMOGLOBIN GLYCOSYLATED A1C: CPT | Performed by: INTERNAL MEDICINE

## 2022-09-15 PROCEDURE — G8427 DOCREV CUR MEDS BY ELIG CLIN: HCPCS | Performed by: INTERNAL MEDICINE

## 2022-09-15 RX ORDER — ZOSTER VACCINE RECOMBINANT, ADJUVANTED 50 MCG/0.5
0.5 KIT INTRAMUSCULAR SEE ADMIN INSTRUCTIONS
Qty: 0.5 ML | Refills: 0 | Status: SHIPPED | OUTPATIENT
Start: 2022-09-15 | End: 2022-09-15 | Stop reason: SDUPTHER

## 2022-09-15 RX ORDER — LISINOPRIL 5 MG/1
2.5 TABLET ORAL DAILY
Qty: 30 TABLET | Refills: 1 | Status: SHIPPED | OUTPATIENT
Start: 2022-09-15 | End: 2022-11-14

## 2022-09-15 RX ORDER — LISINOPRIL 5 MG/1
2.5 TABLET ORAL DAILY
Qty: 30 TABLET | Refills: 5 | Status: SHIPPED | OUTPATIENT
Start: 2022-09-15 | End: 2022-09-15 | Stop reason: SDUPTHER

## 2022-09-15 RX ORDER — ZOSTER VACCINE RECOMBINANT, ADJUVANTED 50 MCG/0.5
0.5 KIT INTRAMUSCULAR SEE ADMIN INSTRUCTIONS
Qty: 0.5 ML | Refills: 0 | Status: SHIPPED | OUTPATIENT
Start: 2022-09-15 | End: 2022-09-16

## 2022-09-15 ASSESSMENT — ENCOUNTER SYMPTOMS
BLOOD IN STOOL: 0
VOICE CHANGE: 0
CONSTIPATION: 0
EYE ITCHING: 0
COLOR CHANGE: 0
SHORTNESS OF BREATH: 0
SINUS PRESSURE: 0
RECTAL PAIN: 0
EYE REDNESS: 0
FACIAL SWELLING: 0
BACK PAIN: 0
DIARRHEA: 0
SORE THROAT: 0
ABDOMINAL DISTENTION: 0
EYE PAIN: 0
VOMITING: 0
RHINORRHEA: 0
SINUS PAIN: 0
COUGH: 0
EYE DISCHARGE: 0
CHEST TIGHTNESS: 0
NAUSEA: 0
TROUBLE SWALLOWING: 0
ABDOMINAL PAIN: 0
PHOTOPHOBIA: 0
APNEA: 0
WHEEZING: 0

## 2022-09-15 NOTE — PROGRESS NOTES
Subjective:      Patient ID: Rock Nolasco is a 59 y.o. male Established patient, here for evaluation of the following chief complaint(s):  Chief Complaint   Patient presents with    Diabetes       HPI  68-year-old diabetic with hypogonadism essential hypertension and history of pulmonary embolism presents to establish continuity with me as his primary care doctor. Hypogonadism in male: compliant with testoterone    Type 2 diabetes mellitus with other specified complication, unspecified whether long term insulin use ([facility]): A1c = 5.9 September 15, 2022. Januvia  And metformin 1000 mg twice daily. Acute saddle pulmonary embolism with acute cor pulmonale (HCC)/Deep vein thrombosis (DVT) of right lower extremity, unspecified chronicity, unspecified vein (HCC)-eliquis    Due for Prostate cancer screening        Mother :  dementia  Father:      At present he denies polyuria,  Polydipsia, constitutional, sinus, visual, cardiopulmonary, urologic, gastrointestinal, immunologic/hematologic, musculoskeletal, neurologic,dermatologic, or psychiatric complaints. Current Outpatient Medications on File Prior to Visit   Medication Sig Dispense Refill    SYRINGE-NEEDLE, DISP, 3 ML (B-D INTEGRA SYRINGE) 22G X 1-1/2\" 3 ML MISC 1 each by Does not apply route daily 20 each 6    SITagliptin (JANUVIA) 100 MG tablet Take 1 tablet by mouth daily 30 tablet 5    OneTouch Delica Lancets 70X MISC bid 100 each 3    metFORMIN (GLUCOPHAGE) 1000 MG tablet Take 1 tablet by mouth 2 times daily (with meals) 60 tablet 06    blood glucose test strips (ONETOUCH VERIO) strip 1 each by In Vitro route 2 times daily As needed.  100 each 3    apixaban (ELIQUIS) 5 MG TABS tablet TAKE 1 TABLET BY MOUTH 2 TIMES DAILY 60 tablet 5    famotidine (PEPCID) 20 MG tablet Take 20 mg by mouth 2 times daily      testosterone cypionate (DEPOTESTOTERONE CYPIONATE) 200 MG/ML injection       Esomeprazole Magnesium (NEXIUM PO) Take by mouth      Blood sleep disturbance and suicidal ideas. The patient is not nervous/anxious. Objective:   /76   Pulse 72   Resp 14   Ht 5' 11\" (1.803 m)   Wt 283 lb (128.4 kg)   SpO2 96%   BMI 39.47 kg/m²     Physical Exam  Constitutional:       General: He is not in acute distress. Appearance: He is well-developed. HENT:      Head: Normocephalic. Right Ear: External ear normal.      Left Ear: External ear normal.   Eyes:      Conjunctiva/sclera: Conjunctivae normal.   Neck:      Vascular: No JVD. Trachea: No tracheal deviation. Cardiovascular:      Rate and Rhythm: Normal rate and regular rhythm. Heart sounds: Normal heart sounds. Pulmonary:      Effort: Pulmonary effort is normal. No respiratory distress. Breath sounds: Normal breath sounds. No wheezing or rales. Chest:      Chest wall: No tenderness. Abdominal:      General: Bowel sounds are normal. There is no distension. Palpations: Abdomen is soft. There is no mass. Tenderness: There is no abdominal tenderness. There is no guarding or rebound. Musculoskeletal:         General: No tenderness or deformity. Cervical back: Neck supple. Skin:     General: Skin is warm and dry. Coloration: Skin is not pale. Findings: No erythema or rash. Neurological:      Mental Status: He is alert and oriented to person, place, and time. Motor: No abnormal muscle tone. Deep Tendon Reflexes: Reflexes normal.   Psychiatric:         Thought Content: Thought content normal.         Judgment: Judgment normal.       Assessment:       Diagnosis Orders   1. Type 2 diabetes mellitus with other specified complication, unspecified whether long term insulin use (HCC)  POCT glycosylated hemoglobin (Hb A1C)    GRACIELA - Zeb Ames MD, Opthalmology, [facility]           No results found for: [place], CMP, CBC, CBCAUTODIF  Plan:      Kayleen Tellez was seen today for established new doctor.     Diagnoses and all orders for this visit:    Hypogonadism in male: Testosterone replacemt via injection    Type 2 diabetes mellitus with other specified complication, unspecified whether long term insulin use ([facility]- Glucophage and Januvia     Acute saddle pulmonary embolism with acute cor pulmonale (HCC)- Eliquis    Deep vein thrombosis (DVT) of right lower extremity, unspecified chronicity, unspecified vein (HCC)    Prostate cancer screening  -     PSA Screening; Future     Acid reflux : nexium    Return in about 3 months (around 12/15/2022). On this date 09/15/22 I have spent  minutes reviewing previous notes, test results and face to face with the patient discussing the diagnosis and importance of compliance with the treatment plan. Juan Arrieta MD    Please note, this report has been partially produced using speech recognition software  and may cause  and /or contain errors related to that system including grammar, punctuation and spelling as well as words and phrases that may seem inappropriate. If there are questions or concerns please feel free to contact me to clarify.

## 2022-10-05 DIAGNOSIS — E11.69 TYPE 2 DIABETES MELLITUS WITH OTHER SPECIFIED COMPLICATION, UNSPECIFIED WHETHER LONG TERM INSULIN USE (HCC): ICD-10-CM

## 2022-10-05 RX ORDER — LANCETS 33 GAUGE
EACH MISCELLANEOUS
Qty: 100 EACH | Refills: 3 | Status: SHIPPED | OUTPATIENT
Start: 2022-10-05

## 2022-10-06 DIAGNOSIS — I82.401 DEEP VEIN THROMBOSIS (DVT) OF RIGHT LOWER EXTREMITY, UNSPECIFIED CHRONICITY, UNSPECIFIED VEIN (HCC): ICD-10-CM

## 2022-10-06 DIAGNOSIS — I26.02 ACUTE SADDLE PULMONARY EMBOLISM WITH ACUTE COR PULMONALE (HCC): Primary | ICD-10-CM

## 2022-10-06 NOTE — TELEPHONE ENCOUNTER
Requesting medication refill. Please approve or deny this request.    Rx requested:  Requested Prescriptions     Pending Prescriptions Disp Refills    apixaban (ELIQUIS) 5 MG TABS tablet [Pharmacy Med Name: Dorthea Push 5 MG TABLET 5 Tablet] 60 tablet 5     Sig: TAKE 1 TABLET BY MOUTH 2 TIMES DAILY         Last Office Visit:   6/3/2022      Next Visit Date:  Future Appointments   Date Time Provider Tamera Brown   11/8/2022  1:30 PM Kasi Mejia MD [facility]   12/5/2022  1:00 PM Joycelyn Ford MD [address]   12/15/2022  1:15 PM Kenia Sierra MD [facility]   12/22/2022  1:30 PM Barry Leslie, [facility]   6/15/2023  1:00 PM Albert Conklin, [facility]               Last refill 8/10/22. Please approve or deny.

## 2022-10-29 DIAGNOSIS — I26.02 ACUTE SADDLE PULMONARY EMBOLISM WITH ACUTE COR PULMONALE (HCC): ICD-10-CM

## 2022-10-29 DIAGNOSIS — I82.401 DEEP VEIN THROMBOSIS (DVT) OF RIGHT LOWER EXTREMITY, UNSPECIFIED CHRONICITY, UNSPECIFIED VEIN (HCC): ICD-10-CM

## 2022-10-31 NOTE — TELEPHONE ENCOUNTER
Requesting medication refill. Please approve or deny this request.    Rx requested:  Requested Prescriptions     Pending Prescriptions Disp Refills    apixaban (ELIQUIS) 5 MG TABS tablet [Pharmacy Med Name: Rachelle Giordano 5 MG TABLET 5 Tablet] 60 tablet 5     Sig: TAKE 1 TABLET BY MOUTH 2 TIMES DAILY         Last Office Visit:   6/3/2022      Next Visit Date:  Future Appointments   Date Time Provider Tamera Brown   11/8/2022  1:30 PM Sammi Barnett MD [facility]   12/5/2022  1:00 PM Juliane Coyle  [address]   12/15/2022  1:15 PM Kiko Barnett MD [facility]   12/22/2022  1:30 PM Marco Chappell, [address] [place] [address],[facility]   6/15/2023  1:00 PM Albert Roland [facility]               Last refill 10/6/22. Please approve or deny.

## 2022-11-02 ENCOUNTER — TELEPHONE (OUTPATIENT)
Dept: UROLOGY | Age: 57
End: 2022-11-02

## 2022-11-02 NOTE — TELEPHONE ENCOUNTER
When I called to see if the PSA was done I was told by the patient he will follow up with his PCP for the PSA and will call to r/s after that.

## 2022-11-12 DIAGNOSIS — E11.69 TYPE 2 DIABETES MELLITUS WITH OTHER SPECIFIED COMPLICATION, UNSPECIFIED WHETHER LONG TERM INSULIN USE (HCC): ICD-10-CM

## 2022-11-15 RX ORDER — SITAGLIPTIN 100 MG/1
TABLET, FILM COATED ORAL
Qty: 30 TABLET | Refills: 3 | Status: SHIPPED | OUTPATIENT
Start: 2022-11-15

## 2022-11-26 DIAGNOSIS — E29.1 HYPOGONADISM IN MALE: Primary | ICD-10-CM

## 2022-11-28 RX ORDER — TESTOSTERONE CYPIONATE 200 MG/ML
INJECTION INTRAMUSCULAR
Qty: 10 ML | Refills: 3 | Status: SHIPPED | OUTPATIENT
Start: 2022-11-28 | End: 2022-12-28

## 2022-12-01 DIAGNOSIS — E29.1 HYPOGONADISM IN MALE: ICD-10-CM

## 2022-12-01 DIAGNOSIS — E11.69 TYPE 2 DIABETES MELLITUS WITH OTHER SPECIFIED COMPLICATION, UNSPECIFIED WHETHER LONG TERM INSULIN USE (HCC): ICD-10-CM

## 2022-12-01 LAB
ANION GAP SERPL CALCULATED.3IONS-SCNC: 12 MEQ/L (ref 9–15)
BUN BLDV-MCNC: 19 MG/DL (ref 6–20)
CALCIUM SERPL-MCNC: 9.6 MG/DL (ref 8.5–9.9)
CHLORIDE BLD-SCNC: 100 MEQ/L (ref 95–107)
CO2: 26 MEQ/L (ref 20–31)
CREAT SERPL-MCNC: 0.95 MG/DL (ref 0.7–1.2)
GFR SERPL CREATININE-BSD FRML MDRD: >60 ML/MIN/{1.73_M2}
GLUCOSE BLD-MCNC: 83 MG/DL (ref 70–99)
HBA1C MFR BLD: 6.4 % (ref 4.8–5.9)
POTASSIUM SERPL-SCNC: 4.1 MEQ/L (ref 3.4–4.9)
SEX HORMONE BINDING GLOBULIN: 23 NMOL/L (ref 11–80)
SODIUM BLD-SCNC: 138 MEQ/L (ref 135–144)
TESTOSTERONE FREE-NONMALE: 270.6 PG/ML (ref 47–244)
TESTOSTERONE TOTAL: 955 NG/DL (ref 220–1000)

## 2022-12-05 ENCOUNTER — OFFICE VISIT (OUTPATIENT)
Dept: ENDOCRINOLOGY | Age: 57
End: 2022-12-05
Payer: COMMERCIAL

## 2022-12-05 VITALS
HEIGHT: 71 IN | BODY MASS INDEX: 39.62 KG/M2 | DIASTOLIC BLOOD PRESSURE: 78 MMHG | WEIGHT: 283 LBS | SYSTOLIC BLOOD PRESSURE: 136 MMHG | HEART RATE: 77 BPM | OXYGEN SATURATION: 96 %

## 2022-12-05 DIAGNOSIS — E66.01 MORBID OBESITY (HCC): ICD-10-CM

## 2022-12-05 DIAGNOSIS — E29.1 HYPOGONADISM IN MALE: ICD-10-CM

## 2022-12-05 DIAGNOSIS — E11.69 TYPE 2 DIABETES MELLITUS WITH OTHER SPECIFIED COMPLICATION, UNSPECIFIED WHETHER LONG TERM INSULIN USE (HCC): Primary | ICD-10-CM

## 2022-12-05 LAB
CHP ED QC CHECK: NORMAL
GLUCOSE BLD-MCNC: 91 MG/DL

## 2022-12-05 PROCEDURE — G8417 CALC BMI ABV UP PARAM F/U: HCPCS | Performed by: INTERNAL MEDICINE

## 2022-12-05 PROCEDURE — 2022F DILAT RTA XM EVC RTNOPTHY: CPT | Performed by: INTERNAL MEDICINE

## 2022-12-05 PROCEDURE — 3017F COLORECTAL CA SCREEN DOC REV: CPT | Performed by: INTERNAL MEDICINE

## 2022-12-05 PROCEDURE — G8427 DOCREV CUR MEDS BY ELIG CLIN: HCPCS | Performed by: INTERNAL MEDICINE

## 2022-12-05 PROCEDURE — 3044F HG A1C LEVEL LT 7.0%: CPT | Performed by: INTERNAL MEDICINE

## 2022-12-05 PROCEDURE — 1036F TOBACCO NON-USER: CPT | Performed by: INTERNAL MEDICINE

## 2022-12-05 PROCEDURE — 82962 GLUCOSE BLOOD TEST: CPT | Performed by: INTERNAL MEDICINE

## 2022-12-05 PROCEDURE — 99213 OFFICE O/P EST LOW 20 MIN: CPT | Performed by: INTERNAL MEDICINE

## 2022-12-05 PROCEDURE — G8484 FLU IMMUNIZE NO ADMIN: HCPCS | Performed by: INTERNAL MEDICINE

## 2022-12-05 ASSESSMENT — ENCOUNTER SYMPTOMS: EYES NEGATIVE: 1

## 2022-12-05 NOTE — PROGRESS NOTES
12/5/2022    Assessment:       Diagnosis Orders   1. Type 2 diabetes mellitus with other specified complication, unspecified whether long term insulin use (HCC)  POCT Glucose    Hemoglobin N3D    Basic Metabolic Panel, Fasting    GRACIELA Luciano MD, Ophthalmology, [facility]      2. Hypogonadism in male  Testosterone, free, total      3. Morbid obesity ([address]              PLAN:     Continue current dose of metformin Januvia  Continue current dose of testosterone  OARRS report reviewed  Follow-up in 4 months  Referral made to ophthalmologist  Orders Placed This Encounter   Procedures    Testosterone, free, total     Standing Status:   Future     Standing Expiration Date:   12/5/2023    Hemoglobin A1C     Standing Status:   Future     Standing Expiration Date:   73/5/1495    Basic Metabolic Panel, Fasting     Standing Status:   Future     Standing Expiration Date:   12/5/2023    GRACIELA Luciano MD, Ophthalmology, [facility]     Referral Priority:   Routine     Referral Type:   Eval and Treat     Referral Reason:   Specialty Services Required     Referred to Provider:   Jodee Hickman MD     Requested Specialty:   Ophthalmology     Number of Visits Requested:   1    POCT Glucose           Orders Placed This Encounter   Procedures    POCT Glucose     No orders of the defined types were placed in this encounter. No follow-ups on file.   Subjective:     Chief Complaint   Patient presents with    Diabetes    Hypogonadism    Obesity     Vitals:    12/05/22 1247   BP: 136/78   Pulse: 77   SpO2: 96%   Weight: 283 lb (128.4 kg)   Height: 5' 11\" (1.803 m)     Wt Readings from Last 3 Encounters:   12/05/22 283 lb (128.4 kg)   09/15/22 283 lb (128.4 kg)   08/26/22 282 lb (127.9 kg)     BP Readings from Last 3 Encounters:   12/05/22 136/78   09/15/22 122/76   08/26/22 135/85     Follow-up on type 2 diabetes obesity patient is on metformin and Januvia A1c has been stable the last 1 was 6 4 testing twice a day overall blood sugars close to 120 denies any severe hypoglycemia  History of hypogonadism on testosterone injection 200 mg every 2 weeks last testosterone was in the 900 range  Has not had eye exam done recently      Diabetes  He presents for his follow-up diabetic visit. He has type 2 diabetes mellitus. Pertinent negatives for diabetes include no polydipsia and no polyuria. Symptoms are stable. Current diabetic treatment includes oral agent (dual therapy). His overall blood glucose range is 110-130 mg/dl. (Hemoglobin A1C       Date                     Value               Ref Range           Status                12/01/2022               6.4 (H)             4.8 - 5.9 %         Final            ----------  ) An ACE inhibitor/angiotensin II receptor blocker is being taken.    Past Medical History:   Diagnosis Date    Abnormal EKG 12/3/2021    Chronic back pain     Diabetes (HCC)     GERD (gastroesophageal reflux disease)     High cholesterol     History of DVT (deep vein thrombosis) 6/3/2021    History of pulmonary embolism 6/3/2021    Hypertension     IBS (irritable bowel syndrome)     Kidney, malrotation     Sleep apnea      Past Surgical History:   Procedure Laterality Date    PULMONARY EMBOLISM SURGERY  2021     Social History     Socioeconomic History    Marital status: Single     Spouse name: Not on file    Number of children: Not on file    Years of education: Not on file    Highest education level: Not on file   Occupational History    Not on file   Tobacco Use    Smoking status: Never     Passive exposure: Never    Smokeless tobacco: Never   Vaping Use    Vaping Use: Never used   Substance and Sexual Activity    Alcohol use: No    Drug use: No    Sexual activity: Yes   Other Topics Concern    Not on file   Social History Narrative    Not on file     Social Determinants of Health     Financial Resource Strain: Low Risk     Difficulty of Paying Living Expenses: Not hard at all   Food Insecurity: No Food Insecurity    Worried About Running Out of Food in the Last Year: Never true    Ran Out of Food in the Last Year: Never true   Transportation Needs: Not on file   Physical Activity: Unknown    Days of Exercise per Week: 1 day    Minutes of Exercise per Session: Not on file   Stress: Not on file   Social Connections: Not on file   Intimate Partner Violence: Not At Risk    Fear of Current or Ex-Partner: No    Emotionally Abused: No    Physically Abused: No    Sexually Abused: No   Housing Stability: Not on file     Family History   Problem Relation Age of Onset    Asthma Mother     Heart Disease Father      Allergies   Allergen Reactions    Quinolones        Current Outpatient Medications:     testosterone cypionate (DEPOTESTOTERONE CYPIONATE) 200 MG/ML injection, 1MLS INJECT EVERY 2 WEEKS, Disp: 10 mL, Rfl: 3    JANUVIA 100 MG tablet, TAKE 1 TABLET BY MOUTH DAILY, Disp: 30 tablet, Rfl: 3    apixaban (ELIQUIS) 5 MG TABS tablet, TAKE 1 TABLET BY MOUTH 2 TIMES DAILY, Disp: 60 tablet, Rfl: 5    Lancets (ONETOUCH DELICA PLUS [id number]) MISC, TEST TWICE DAILY, Disp: 100 each, Rfl: 3    SYRINGE-NEEDLE, DISP, 3 ML (B-D INTEGRA SYRINGE) 22G X 1-1/2\" 3 ML MISC, 1 each by Does not apply route daily, Disp: 20 each, Rfl: 6    metFORMIN (GLUCOPHAGE) 1000 MG tablet, Take 1 tablet by mouth 2 times daily (with meals), Disp: 60 tablet, Rfl: 06    blood glucose test strips (ONETOUCH VERIO) strip, 1 each by In Vitro route 2 times daily As needed. , Disp: 100 each, Rfl: 3    famotidine (PEPCID) 20 MG tablet, Take 20 mg by mouth 2 times daily, Disp: , Rfl:     Esomeprazole Magnesium (NEXIUM PO), Take by mouth, Disp: , Rfl:     Blood Glucose Monitoring Suppl (ONETOUCH VERIO) w/Device KIT, As directed, Disp: 1 kit, Rfl: 1    Respiratory Therapy Supplies ROSALIND, New Full face CPAP mask and supplies. , Disp: 1 Device, Rfl: 0    CPAP Machine MISC, by Does not apply route New CPAP with 11 cm, Disp: 1 each, Rfl: 0    lisinopril (PRINIVIL;ZESTRIL) 5 MG tablet, Take 0.5 tablets by mouth daily, Disp: 30 tablet, Rfl: 1  Lab Results   Component Value Date     12/01/2022    K 4.1 12/01/2022     12/01/2022    CO2 26 12/01/2022    BUN 19 12/01/2022    CREATININE 0.95 12/01/2022    GLUCOSE 91 12/05/2022    CALCIUM 9.6 12/01/2022    PROT 7.8 04/21/2021    LABALBU 4.3 04/21/2021    BILITOT 0.7 04/21/2021    ALKPHOS 77 04/21/2021    AST 20 04/21/2021    ALT 35 04/21/2021    LABGLOM >60.0 12/01/2022    GFRAA >60.0 06/03/2022    GLOB 3.5 04/21/2021     Lab Results   Component Value Date    WBC 11.2 (H) 04/22/2021    HGB 13.6 (L) 04/22/2021    HCT 41.9 (L) 04/22/2021    MCV 92.5 04/22/2021     04/22/2021     Lab Results   Component Value Date    LABA1C 6.4 (H) 12/01/2022    LABA1C 5.9 09/15/2022    LABA1C 6.7 (H) 06/03/2022     Lab Results   Component Value Date    CHOLFAST 196 06/03/2022    TRIGLYCFAST 193 (H) 06/03/2022    HDL 31 (L) 06/03/2022    HDL 28 (L) 04/22/2021    HDL 35 (L) 05/21/2013    LDLCALC 126 06/03/2022    LDLCALC 106 04/22/2021    LDLCALC 126 05/21/2013    CHOL 169 04/22/2021    CHOL 264 (H) 05/21/2013    TRIG 173 (H) 04/22/2021    TRIG 516 (H) 05/21/2013     No results found for: TESTM  Lab Results   Component Value Date    TSH 0.863 05/21/2013     No results found for: TPOABS    Review of Systems   Eyes: Negative. Cardiovascular: Negative. Endocrine: Negative for polydipsia and polyuria. Hematological: Negative. All other systems reviewed and are negative. Objective:   Physical Exam  Vitals reviewed. Constitutional:       General: He is not in acute distress. Appearance: Normal appearance. He is obese. HENT:      Head: Normocephalic and atraumatic. Right Ear: External ear normal.      Left Ear: External ear normal.      Nose: Nose normal.   Eyes:      General: No scleral icterus. Right eye: No discharge. Left eye: No discharge.       Extraocular Movements: Extraocular movements intact. Conjunctiva/sclera: Conjunctivae normal.   Cardiovascular:      Rate and Rhythm: Normal rate. Pulmonary:      Effort: Pulmonary effort is normal.   Musculoskeletal:         General: Normal range of motion. Cervical back: Normal range of motion and neck supple. Feet:    Neurological:      General: No focal deficit present. Mental Status: He is alert and oriented to person, place, and time.    Psychiatric:         Mood and Affect: Mood normal.         Behavior: Behavior normal.

## 2022-12-10 DIAGNOSIS — E11.69 TYPE 2 DIABETES MELLITUS WITH OTHER SPECIFIED COMPLICATION, UNSPECIFIED WHETHER LONG TERM INSULIN USE (HCC): ICD-10-CM

## 2022-12-15 ENCOUNTER — OFFICE VISIT (OUTPATIENT)
Dept: FAMILY MEDICINE CLINIC | Age: 57
End: 2022-12-15
Payer: COMMERCIAL

## 2022-12-15 VITALS
SYSTOLIC BLOOD PRESSURE: 136 MMHG | OXYGEN SATURATION: 96 % | HEIGHT: 71 IN | BODY MASS INDEX: 39.62 KG/M2 | DIASTOLIC BLOOD PRESSURE: 80 MMHG | RESPIRATION RATE: 16 BRPM | WEIGHT: 283 LBS | HEART RATE: 74 BPM

## 2022-12-15 DIAGNOSIS — G47.33 OSA ON CPAP: ICD-10-CM

## 2022-12-15 DIAGNOSIS — Z99.89 OSA ON CPAP: ICD-10-CM

## 2022-12-15 DIAGNOSIS — E11.69 TYPE 2 DIABETES MELLITUS WITH OTHER SPECIFIED COMPLICATION, UNSPECIFIED WHETHER LONG TERM INSULIN USE (HCC): Primary | ICD-10-CM

## 2022-12-15 DIAGNOSIS — Z12.5 PROSTATE CANCER SCREENING: ICD-10-CM

## 2022-12-15 DIAGNOSIS — E29.1 HYPOGONADISM IN MALE: ICD-10-CM

## 2022-12-15 DIAGNOSIS — I10 ESSENTIAL HYPERTENSION: ICD-10-CM

## 2022-12-15 DIAGNOSIS — I26.02 ACUTE SADDLE PULMONARY EMBOLISM WITH ACUTE COR PULMONALE (HCC): ICD-10-CM

## 2022-12-15 DIAGNOSIS — R97.20 ELEVATED PSA: ICD-10-CM

## 2022-12-15 DIAGNOSIS — I82.401 DEEP VEIN THROMBOSIS (DVT) OF RIGHT LOWER EXTREMITY, UNSPECIFIED CHRONICITY, UNSPECIFIED VEIN (HCC): ICD-10-CM

## 2022-12-15 LAB — PROSTATE SPECIFIC ANTIGEN: 7.91 NG/ML (ref 0–4)

## 2022-12-15 PROCEDURE — G8427 DOCREV CUR MEDS BY ELIG CLIN: HCPCS | Performed by: INTERNAL MEDICINE

## 2022-12-15 PROCEDURE — G8484 FLU IMMUNIZE NO ADMIN: HCPCS | Performed by: INTERNAL MEDICINE

## 2022-12-15 PROCEDURE — 1036F TOBACCO NON-USER: CPT | Performed by: INTERNAL MEDICINE

## 2022-12-15 PROCEDURE — 3044F HG A1C LEVEL LT 7.0%: CPT | Performed by: INTERNAL MEDICINE

## 2022-12-15 PROCEDURE — 99214 OFFICE O/P EST MOD 30 MIN: CPT | Performed by: INTERNAL MEDICINE

## 2022-12-15 PROCEDURE — 2022F DILAT RTA XM EVC RTNOPTHY: CPT | Performed by: INTERNAL MEDICINE

## 2022-12-15 PROCEDURE — 3074F SYST BP LT 130 MM HG: CPT | Performed by: INTERNAL MEDICINE

## 2022-12-15 PROCEDURE — G8417 CALC BMI ABV UP PARAM F/U: HCPCS | Performed by: INTERNAL MEDICINE

## 2022-12-15 PROCEDURE — 3078F DIAST BP <80 MM HG: CPT | Performed by: INTERNAL MEDICINE

## 2022-12-15 PROCEDURE — 3017F COLORECTAL CA SCREEN DOC REV: CPT | Performed by: INTERNAL MEDICINE

## 2022-12-15 RX ORDER — LISINOPRIL 5 MG/1
2.5 TABLET ORAL DAILY
Qty: 90 TABLET | Refills: 1 | Status: SHIPPED | OUTPATIENT
Start: 2022-12-15 | End: 2023-03-15

## 2022-12-15 ASSESSMENT — ENCOUNTER SYMPTOMS
SINUS PRESSURE: 0
EYE REDNESS: 0
TROUBLE SWALLOWING: 0
ABDOMINAL DISTENTION: 0
DIARRHEA: 0
COLOR CHANGE: 0
BACK PAIN: 0
EYE PAIN: 0
VOMITING: 0
BLOOD IN STOOL: 0
EYE DISCHARGE: 0
EYE ITCHING: 0
VOICE CHANGE: 0
SINUS PAIN: 0
ABDOMINAL PAIN: 0
WHEEZING: 0
COUGH: 0
APNEA: 0
SORE THROAT: 0
CONSTIPATION: 0
RHINORRHEA: 0
CHEST TIGHTNESS: 0
NAUSEA: 0
RECTAL PAIN: 0
SHORTNESS OF BREATH: 0
PHOTOPHOBIA: 0
FACIAL SWELLING: 0

## 2022-12-15 NOTE — PROGRESS NOTES
Subjective:      Patient ID: Love Herrmann is a 59 y.o. male Established patient, here for evaluation of the following chief complaint(s):  Chief Complaint   Patient presents with    Diabetes       HPI  20-year-old diabetic with hypogonadism essential hypertension and history of pulmonary embolism presents to establish continuity with me as his primary care doctor. Hypogonadism in male: compliant with testoterone    Type 2 diabetes mellitus with other specified complication, unspecified whether long term insulin use ([facility]): A1c = 6.4 2022. Januvia  And metformin 1000 mg twice daily. Acute saddle pulmonary embolism with acute cor pulmonale (HCC)/Deep vein thrombosis (DVT) of right lower extremity, unspecified chronicity, unspecified vein (HCC)-eliquis    Due for Prostate cancer screening        Mother :  dementia  Father:      At present he denies polyuria,  Polydipsia, constitutional, sinus, visual, cardiopulmonary, urologic, gastrointestinal, immunologic/hematologic, musculoskeletal, neurologic,dermatologic, or psychiatric complaints. Current Outpatient Medications on File Prior to Visit   Medication Sig Dispense Refill    metFORMIN (GLUCOPHAGE) 1000 MG tablet TAKE 1 TABLET BY MOUTH 2 TIMES DAILY (WITH MEALS) 60 tablet 6    testosterone cypionate (DEPOTESTOTERONE CYPIONATE) 200 MG/ML injection 1MLS INJECT EVERY 2 WEEKS 10 mL 3    JANUVIA 100 MG tablet TAKE 1 TABLET BY MOUTH DAILY 30 tablet 3    apixaban (ELIQUIS) 5 MG TABS tablet TAKE 1 TABLET BY MOUTH 2 TIMES DAILY 60 tablet 5    Lancets (ONETOUCH DELICA PLUS [id number]) MISC TEST TWICE DAILY 100 each 3    SYRINGE-NEEDLE, DISP, 3 ML (B-D INTEGRA SYRINGE) 22G X 1-1/2\" 3 ML MISC 1 each by Does not apply route daily 20 each 6    blood glucose test strips (ONETOUCH VERIO) strip 1 each by In Vitro route 2 times daily As needed.  100 each 3    famotidine (PEPCID) 20 MG tablet Take 20 mg by mouth 2 times daily      Esomeprazole Magnesium (NEXIUM PO) Take by mouth      Blood Glucose Monitoring Suppl (Kae Carranza) w/Device KIT As directed 1 kit 1    Respiratory Therapy Supplies ROSALIND New Full face CPAP mask and supplies. 1 Device 0    CPAP Machine MISC by Does not apply route New CPAP with 11 cm 1 each 0     No current facility-administered medications on file prior to visit. Quinolones    Review of Systems   Constitutional:  Negative for chills, diaphoresis, fatigue and fever. HENT:  Negative for congestion, dental problem, drooling, ear discharge, ear pain, facial swelling, hearing loss, mouth sores, nosebleeds, postnasal drip, rhinorrhea, sinus pressure, sinus pain, sneezing, sore throat, tinnitus, trouble swallowing and voice change. Eyes:  Negative for photophobia, pain, discharge, redness, itching and visual disturbance. Respiratory:  Negative for apnea, cough, chest tightness, shortness of breath and wheezing. Cardiovascular:  Negative for chest pain, palpitations and leg swelling. Gastrointestinal:  Negative for abdominal distention, abdominal pain, blood in stool, constipation, diarrhea, nausea, rectal pain and vomiting. Endocrine: Negative for cold intolerance, heat intolerance, polydipsia, polyphagia and polyuria. Genitourinary:  Negative for decreased urine volume, difficulty urinating, dysuria, flank pain, frequency, genital sores, hematuria and urgency. Musculoskeletal:  Negative for arthralgias, back pain, gait problem, joint swelling, myalgias, neck pain and neck stiffness. Skin:  Negative for color change, rash and wound. Allergic/Immunologic: Negative for environmental allergies and food allergies. Neurological:  Negative for dizziness, tremors, seizures, syncope, facial asymmetry, speech difficulty, weakness, light-headedness, numbness and headaches. Hematological:  Negative for adenopathy. Does not bruise/bleed easily.    Psychiatric/Behavioral:  Negative for agitation, confusion, decreased concentration, hallucinations, self-injury, sleep disturbance and suicidal ideas. The patient is not nervous/anxious. Objective:   /80   Pulse 74   Resp 16   Ht 5' 11\" (1.803 m)   Wt 283 lb (128.4 kg)   SpO2 96%   BMI 39.47 kg/m²     Physical Exam  Constitutional:       General: He is not in acute distress. Appearance: He is well-developed. HENT:      Head: Normocephalic. Right Ear: External ear normal.      Left Ear: External ear normal.   Eyes:      Conjunctiva/sclera: Conjunctivae normal.   Neck:      Vascular: No JVD. Trachea: No tracheal deviation. Cardiovascular:      Rate and Rhythm: Normal rate and regular rhythm. Heart sounds: Normal heart sounds. Pulmonary:      Effort: Pulmonary effort is normal. No respiratory distress. Breath sounds: Normal breath sounds. No wheezing or rales. Chest:      Chest wall: No tenderness. Abdominal:      General: Bowel sounds are normal. There is no distension. Palpations: Abdomen is soft. There is no mass. Tenderness: There is no abdominal tenderness. There is no guarding or rebound. Musculoskeletal:         General: No tenderness or deformity. Cervical back: Neck supple. Skin:     General: Skin is warm and dry. Coloration: Skin is not pale. Findings: No erythema or rash. Neurological:      Mental Status: He is alert and oriented to person, place, and time. Motor: No abnormal muscle tone. Deep Tendon Reflexes: Reflexes normal.   Psychiatric:         Thought Content: Thought content normal.         Judgment: Judgment normal.       Assessment:       Diagnosis Orders   1. Type 2 diabetes mellitus with other specified complication, unspecified whether long term insulin use ([facility]        2. Deep vein thrombosis (DVT) of right lower extremity, unspecified chronicity, unspecified vein (HCC)        3. Prostate cancer screening        4.  Acute saddle pulmonary embolism with acute cor pulmonale ([facility]        5. ROSA on CPAP        6. Essential hypertension        7. Elevated PSA  PSA Screening      8. Hypogonadism in male             No results found for: LIPIDPAN, BMP, CMP, CBC, CBCAUTODIF  Plan:      Elmer Quiles was seen today for established new doctor. Diagnoses and all orders for this visit:    Hypogonadism in male: Testosterone replacemt via injection    Type 2 diabetes mellitus with other specified complication, unspecified whether long term insulin use ([facility]- Glucophage and Januvia     Acute saddle pulmonary embolism with acute cor pulmonale (HCC)- Eliquis    Deep vein thrombosis (DVT) of right lower extremity, unspecified chronicity, unspecified vein (HCC)    Prostate cancer screening  -     PSA Screening; Future     Acid reflux : nexium    No follow-ups on file. On this date 12/15/22 I have spent  minutes reviewing previous notes, test results and face to face with the patient discussing the diagnosis and importance of compliance with the treatment plan. Gabe Romeo MD    Please note, this report has been partially produced using speech recognition software  and may cause  and /or contain errors related to that system including grammar, punctuation and spelling as well as words and phrases that may seem inappropriate. If there are questions or concerns please feel free to contact me to clarify.

## 2022-12-22 ENCOUNTER — OFFICE VISIT (OUTPATIENT)
Dept: PULMONOLOGY | Age: 57
End: 2022-12-22
Payer: COMMERCIAL

## 2022-12-22 VITALS
OXYGEN SATURATION: 94 % | DIASTOLIC BLOOD PRESSURE: 82 MMHG | SYSTOLIC BLOOD PRESSURE: 128 MMHG | BODY MASS INDEX: 39.33 KG/M2 | HEART RATE: 86 BPM | WEIGHT: 282 LBS

## 2022-12-22 DIAGNOSIS — G47.33 OSA ON CPAP: Primary | ICD-10-CM

## 2022-12-22 DIAGNOSIS — E66.9 OBESITY (BMI 30-39.9): ICD-10-CM

## 2022-12-22 DIAGNOSIS — I82.401 DEEP VEIN THROMBOSIS (DVT) OF RIGHT LOWER EXTREMITY, UNSPECIFIED CHRONICITY, UNSPECIFIED VEIN (HCC): ICD-10-CM

## 2022-12-22 DIAGNOSIS — I26.02 ACUTE SADDLE PULMONARY EMBOLISM WITH ACUTE COR PULMONALE (HCC): ICD-10-CM

## 2022-12-22 DIAGNOSIS — Z99.89 OSA ON CPAP: Primary | ICD-10-CM

## 2022-12-22 PROCEDURE — 1036F TOBACCO NON-USER: CPT | Performed by: INTERNAL MEDICINE

## 2022-12-22 PROCEDURE — G8484 FLU IMMUNIZE NO ADMIN: HCPCS | Performed by: INTERNAL MEDICINE

## 2022-12-22 PROCEDURE — G8427 DOCREV CUR MEDS BY ELIG CLIN: HCPCS | Performed by: INTERNAL MEDICINE

## 2022-12-22 PROCEDURE — 99214 OFFICE O/P EST MOD 30 MIN: CPT | Performed by: INTERNAL MEDICINE

## 2022-12-22 PROCEDURE — G8417 CALC BMI ABV UP PARAM F/U: HCPCS | Performed by: INTERNAL MEDICINE

## 2022-12-22 PROCEDURE — 3017F COLORECTAL CA SCREEN DOC REV: CPT | Performed by: INTERNAL MEDICINE

## 2022-12-22 ASSESSMENT — ENCOUNTER SYMPTOMS
NAUSEA: 0
WHEEZING: 0
VOICE CHANGE: 0
SHORTNESS OF BREATH: 0
ABDOMINAL PAIN: 0
SORE THROAT: 0
COUGH: 0
CHEST TIGHTNESS: 0
RHINORRHEA: 0
EYE ITCHING: 0
DIARRHEA: 0
VOMITING: 0

## 2022-12-22 NOTE — PROGRESS NOTES
Subjective:     John Degroot is a 59 y.o. male who complains today of:     Chief Complaint   Patient presents with    Follow-up     4m f/u for ROSA       HPI  He is using CPAP with  11 centimeters of H2O with heated humidity. He is using CPAP for about  8 hours every night. He is using CPAP with Nasal  Mask. He said  sleep is restful with the CPAP use. He is compliant with CPAP therapy and benefiting with CPAP use. No snoring with CPAP use. No complaint of daytime sleepiness or tiredness with CPAP use. He denies taking naps. No sleepiness with driving. He denies difficulty falling asleep or staying asleep. I reviewed compliance report with patient regarding CPAP therapy. He is using  CPAP for 30 days out of 30 days  Average usage of days used is 8 hours and 8 min , average AHI 2.8 with CPAP use. He had a PE thrombectomy done and significant amount of blood clot was pulled out. He also had DVT rt. Leg . he is on Eliquis.       Allergies:  Quinolones  Past Medical History:   Diagnosis Date    Abnormal EKG 12/3/2021    Chronic back pain     Diabetes (HCC)     GERD (gastroesophageal reflux disease)     High cholesterol     History of DVT (deep vein thrombosis) 6/3/2021    History of pulmonary embolism 6/3/2021    Hypertension     IBS (irritable bowel syndrome)     Kidney, malrotation     Sleep apnea      Past Surgical History:   Procedure Laterality Date    PULMONARY EMBOLISM SURGERY  2021     Family History   Problem Relation Age of Onset    Asthma Mother     Heart Disease Father      Social History     Socioeconomic History    Marital status: Single     Spouse name: Not on file    Number of children: Not on file    Years of education: Not on file    Highest education level: Not on file   Occupational History    Not on file   Tobacco Use    Smoking status: Never     Passive exposure: Never    Smokeless tobacco: Never   Vaping Use    Vaping Use: Never used   Substance and Sexual Activity Alcohol use: No    Drug use: No    Sexual activity: Yes   Other Topics Concern    Not on file   Social History Narrative    Not on file     Social Determinants of Health     Financial Resource Strain: Low Risk     Difficulty of Paying Living Expenses: Not hard at all   Food Insecurity: No Food Insecurity    Worried About Running Out of Food in the Last Year: Never true    Ran Out of Food in the Last Year: Never true   Transportation Needs: Not on file   Physical Activity: Unknown    Days of Exercise per Week: 1 day    Minutes of Exercise per Session: Not on file   Stress: Not on file   Social Connections: Not on file   Intimate Partner Violence: Not At Risk    Fear of Current or Ex-Partner: No    Emotionally Abused: No    Physically Abused: No    Sexually Abused: No   Housing Stability: Not on file         Review of Systems   Constitutional:  Negative for chills, diaphoresis, fatigue and fever. HENT:  Negative for congestion, mouth sores, nosebleeds, postnasal drip, rhinorrhea, sneezing, sore throat and voice change. Eyes:  Negative for itching and visual disturbance. Respiratory:  Negative for cough, chest tightness, shortness of breath and wheezing. Cardiovascular: Negative. Negative for chest pain, palpitations and leg swelling. Gastrointestinal:  Negative for abdominal pain, diarrhea, nausea and vomiting. Genitourinary:  Negative for difficulty urinating and hematuria. Musculoskeletal:  Negative for arthralgias, joint swelling and myalgias. Skin:  Negative for rash. Allergic/Immunologic: Negative for environmental allergies. Neurological:  Negative for dizziness, tremors, weakness and headaches.    Psychiatric/Behavioral:  Negative for behavioral problems and sleep disturbance.        :     Vitals:    12/22/22 1318   BP: 128/82   Site: Right Upper Arm   Position: Sitting   Cuff Size: Medium Adult   Pulse: 86   SpO2: 94%   Weight: 282 lb (127.9 kg)     Wt Readings from Last 3 Encounters: 12/22/22 282 lb (127.9 kg)   12/15/22 283 lb (128.4 kg)   12/05/22 283 lb (128.4 kg)         Physical Exam  Constitutional:       Appearance: He is well-developed. He is obese. HENT:      Head: Normocephalic and atraumatic. Nose: Nose normal.   Eyes:      Conjunctiva/sclera: Conjunctivae normal.      Pupils: Pupils are equal, round, and reactive to light. Neck:      Thyroid: No thyromegaly. Vascular: No JVD. Trachea: No tracheal deviation. Cardiovascular:      Rate and Rhythm: Normal rate and regular rhythm. Heart sounds: No murmur heard. No friction rub. No gallop. Pulmonary:      Effort: Pulmonary effort is normal. No respiratory distress. Breath sounds: Normal breath sounds. No wheezing or rales. Chest:      Chest wall: No tenderness. Abdominal:      General: There is no distension. Musculoskeletal:         General: Normal range of motion. Lymphadenopathy:      Cervical: No cervical adenopathy. Skin:     General: Skin is warm and dry. Findings: No rash. Neurological:      Mental Status: He is alert and oriented to person, place, and time. Cranial Nerves: No cranial nerve deficit.    Psychiatric:         Behavior: Behavior normal.       Current Outpatient Medications   Medication Sig Dispense Refill    lisinopril (PRINIVIL;ZESTRIL) 5 MG tablet Take 0.5 tablets by mouth daily 90 tablet 1    metFORMIN (GLUCOPHAGE) 1000 MG tablet TAKE 1 TABLET BY MOUTH 2 TIMES DAILY (WITH MEALS) 60 tablet 6    testosterone cypionate (DEPOTESTOTERONE CYPIONATE) 200 MG/ML injection 1MLS INJECT EVERY 2 WEEKS 10 mL 3    JANUVIA 100 MG tablet TAKE 1 TABLET BY MOUTH DAILY 30 tablet 3    apixaban (ELIQUIS) 5 MG TABS tablet TAKE 1 TABLET BY MOUTH 2 TIMES DAILY 60 tablet 5    Lancets (ONETOUCH DELICA PLUS [id number]) MISC TEST TWICE DAILY 100 each 3    SYRINGE-NEEDLE, DISP, 3 ML (B-D INTEGRA SYRINGE) 22G X 1-1/2\" 3 ML MISC 1 each by Does not apply route daily 20 each 6    blood glucose test strips (ONETOUCH VERIO) strip 1 each by In Vitro route 2 times daily As needed. 100 each 3    famotidine (PEPCID) 20 MG tablet Take 20 mg by mouth 2 times daily      Esomeprazole Magnesium (NEXIUM PO) Take by mouth      Blood Glucose Monitoring Suppl (Ludmila García) w/Device KIT As directed 1 kit 1    Respiratory Therapy Supplies ROSALIND New Full face CPAP mask and supplies. 1 Device 0    CPAP Machine MISC by Does not apply route New CPAP with 11 cm 1 each 0     No current facility-administered medications for this visit. No results found for this or any previous visit.  ]  Results for orders placed during the hospital encounter of 04/21/21    XR CHEST PORTABLE    Narrative  Exam: XR CHEST PORTABLE    History:  sob    Technique: AP portable view of the chest obtained. Comparison: Chest x-ray from May 21, 2013    Chest x-ray portable  Findings: The cardiomediastinal silhouette is within normal limits. There are no infiltrates, consolidations or effusions. Bones of the thorax appear intact. Impression  No radiographic evidence of acute intrathoracic process. Assessment/Plan:     1. ROSA on CPAP  He is using CPAP with  11 centimeters of H2O with heated humidity. He is using CPAP for about  8 hours every night. He is using CPAP with Nasal  Mask. He said  sleep is restful with the CPAP use. He is compliant with CPAP therapy and benefiting with CPAP use. No snoring with CPAP use. I reviewed compliance report with patient regarding CPAP therapy. He is using  CPAP for 30 days out of 30 days  Average usage of days used is 8 hours and 8 min , average AHI 2.8 with CPAP use. 2. Obesity (BMI 30-39. 9)  He is advised try to lose weight. obesity related risk explained to the patient ,  Current weight:  282 lb (127.9 kg) Lbs. BMI:  Body mass index is 39.33 kg/m². Suggested weight control approaches, including dietary changes , exercise, behavioral modification.     3. Acute saddle pulmonary embolism with acute cor pulmonale (HCC) s/p thrombectomy  He had a PE thrombectomy done and significant amount of blood clot was pulled out. 4. Deep vein thrombosis (DVT) of right lower extremity, unspecified chronicity, unspecified vein (HCC)  He also had DVT rt. Leg . he is on Eliquis. Return in about 4 months (around 4/22/2023) for jose, pulmonary embolism.       Cathy Lamar MD

## 2023-01-08 NOTE — TELEPHONE ENCOUNTER
requesting medication refill.  Please approve or deny this request.    Rx requested:  Requested Prescriptions     Pending Prescriptions Disp Refills    lisinopril (PRINIVIL;ZESTRIL) 5 MG tablet [Pharmacy Med Name: LISINOPRIL 5 MG TABLET 5 Tablet] 30 tablet 1     Sig: TAKE 1/2 TABLET BY MOUTH DAILY         Last Office Visit:   12/15/2022      Next Visit Date:  Future Appointments   Date Time Provider Tamera Brown   1/16/2023  1:15 PM Kt Blackman MD [facility] [place]   4/12/2023  1:00 PM Kyung Messer MD [place]   4/17/2023  1:00 PM José Miguel Leija  [place] [address]   5/15/2023  1:00 PM Diandra Ferguson, [address] [facility]   6/15/2023  1:00 PM Albert Colon, [facility]

## 2023-01-09 RX ORDER — LISINOPRIL 5 MG/1
TABLET ORAL
Qty: 30 TABLET | Refills: 1 | Status: SHIPPED | OUTPATIENT
Start: 2023-01-09

## 2023-01-24 ENCOUNTER — OFFICE VISIT (OUTPATIENT)
Dept: UROLOGY | Age: 58
End: 2023-01-24
Payer: COMMERCIAL

## 2023-01-24 ENCOUNTER — PREP FOR PROCEDURE (OUTPATIENT)
Dept: UROLOGY | Age: 58
End: 2023-01-24

## 2023-01-24 VITALS
SYSTOLIC BLOOD PRESSURE: 136 MMHG | WEIGHT: 280 LBS | DIASTOLIC BLOOD PRESSURE: 88 MMHG | BODY MASS INDEX: 39.2 KG/M2 | HEIGHT: 71 IN | HEART RATE: 84 BPM

## 2023-01-24 DIAGNOSIS — R97.20 ELEVATED PSA: Primary | ICD-10-CM

## 2023-01-24 LAB
BILIRUBIN, POC: ABNORMAL
BLOOD URINE, POC: ABNORMAL
CLARITY, POC: CLEAR
COLOR, POC: YELLOW
GLUCOSE URINE, POC: ABNORMAL
KETONES, POC: ABNORMAL
LEUKOCYTE EST, POC: ABNORMAL
NITRITE, POC: ABNORMAL
PH, POC: 5
PROTEIN, POC: ABNORMAL
SPECIFIC GRAVITY, POC: 1.02
UROBILINOGEN, POC: 0.2

## 2023-01-24 PROCEDURE — G8484 FLU IMMUNIZE NO ADMIN: HCPCS | Performed by: UROLOGY

## 2023-01-24 PROCEDURE — G8427 DOCREV CUR MEDS BY ELIG CLIN: HCPCS | Performed by: UROLOGY

## 2023-01-24 PROCEDURE — 3017F COLORECTAL CA SCREEN DOC REV: CPT | Performed by: UROLOGY

## 2023-01-24 PROCEDURE — 99214 OFFICE O/P EST MOD 30 MIN: CPT | Performed by: UROLOGY

## 2023-01-24 PROCEDURE — 1036F TOBACCO NON-USER: CPT | Performed by: UROLOGY

## 2023-01-24 PROCEDURE — 81003 URINALYSIS AUTO W/O SCOPE: CPT | Performed by: UROLOGY

## 2023-01-24 PROCEDURE — G8417 CALC BMI ABV UP PARAM F/U: HCPCS | Performed by: UROLOGY

## 2023-01-24 RX ORDER — SULFAMETHOXAZOLE AND TRIMETHOPRIM 800; 160 MG/1; MG/1
1 TABLET ORAL 2 TIMES DAILY
Qty: 6 TABLET | Refills: 0 | Status: SHIPPED | OUTPATIENT
Start: 2023-01-24 | End: 2023-01-27

## 2023-01-24 NOTE — PROGRESS NOTES
[facility] EVALUATION NOTE                                                 H&P          Note:  Assessment and plan  Rising PSA  Current PSA is 7.91  Patient will be scheduled for prostate biopsy  Instructions given      The note below is complete evaluation of patient on follow-up/consultation                                                                                                                                                 Reason for Visit  PSA of 7.91    History of Present Illness   58-year-old male with history of elevated PSA of 7.91  Patient will be scheduled for prostate biopsies      Urologic Review of Systems/Symptoms  Minimal voiding symptoms  Patient having erectile dysfunction    Review of Systems  Hospitalization: None recent  All 14 categories of Review of Systems otherwise reviewed no other findings reported.   Continues to be on Eliquis for history of pulmonary embolus  Past Medical History:   Diagnosis Date    Abnormal EKG 12/3/2021    Chronic back pain     Diabetes (HCC)     GERD (gastroesophageal reflux disease)     High cholesterol     History of DVT (deep vein thrombosis) 6/3/2021    History of pulmonary embolism 6/3/2021    Hypertension     IBS (irritable bowel syndrome)     Kidney, malrotation     Sleep apnea      Past Surgical History:   Procedure Laterality Date    PULMONARY EMBOLISM SURGERY  2021     Social History     Socioeconomic History    Marital status: Single     Spouse name: None    Number of children: None    Years of education: None    Highest education level: None   Tobacco Use    Smoking status: Never     Passive exposure: Never    Smokeless tobacco: Never   Vaping Use    Vaping Use: Never used   Substance and Sexual Activity    Alcohol use: No    Drug use: No    Sexual activity: Yes     Social Determinants of Health     Financial Resource Strain: Low Risk     Difficulty of Paying Living Expenses: Not hard at all   Food Insecurity: No Food Insecurity Worried About Running Out of Food in the Last Year: Never true    Ran Out of Food in the Last Year: Never true   Physical Activity: Unknown    Days of Exercise per Week: 1 day   Intimate Partner Violence: Not At Risk    Fear of Current or Ex-Partner: No    Emotionally Abused: No    Physically Abused: No    Sexually Abused: No     Family History   Problem Relation Age of Onset    Asthma Mother     Heart Disease Father      Current Outpatient Medications   Medication Sig Dispense Refill    sulfamethoxazole-trimethoprim (BACTRIM DS;SEPTRA DS) 800-160 MG per tablet Take 1 tablet by mouth 2 times daily for 3 days 6 tablet 0    lisinopril (PRINIVIL;ZESTRIL) 5 MG tablet TAKE 1/2 TABLET BY MOUTH DAILY 30 tablet 1    metFORMIN (GLUCOPHAGE) 1000 MG tablet TAKE 1 TABLET BY MOUTH 2 TIMES DAILY (WITH MEALS) 60 tablet 6    JANUVIA 100 MG tablet TAKE 1 TABLET BY MOUTH DAILY 30 tablet 3    apixaban (ELIQUIS) 5 MG TABS tablet TAKE 1 TABLET BY MOUTH 2 TIMES DAILY 60 tablet 5    Lancets (ONETOUCH DELICA PLUS [id number]) MISC TEST TWICE DAILY 100 each 3    SYRINGE-NEEDLE, DISP, 3 ML (B-D INTEGRA SYRINGE) 22G X 1-1/2\" 3 ML MISC 1 each by Does not apply route daily 20 each 6    blood glucose test strips (ONETOUCH VERIO) strip 1 each by In Vitro route 2 times daily As needed. 100 each 3    famotidine (PEPCID) 20 MG tablet Take 20 mg by mouth 2 times daily      Esomeprazole Magnesium (NEXIUM PO) Take by mouth      Blood Glucose Monitoring Suppl (Macie Julina) w/Device KIT As directed 1 kit 1    Respiratory Therapy Supplies ROSALIND New Full face CPAP mask and supplies. 1 Device 0    CPAP Machine MISC by Does not apply route New CPAP with 11 cm 1 each 0    testosterone cypionate (DEPOTESTOTERONE CYPIONATE) 200 MG/ML injection 1MLS INJECT EVERY 2 WEEKS 10 mL 3     No current facility-administered medications for this visit.      Quinolones  All reviewed and verified by Dr Emely Duval on today's visit    PSA   Date Value Ref Range Status 12/15/2022 7.91 (H) 0.00 - 4.00 ng/mL Final     Comment:     When the Total PSA is between 3.00 and 10.00 ng/mL, consider  requesting a Free PSA to aid in diagnosis. 08/04/2022 6.41 (H) 0.00 - 4.00 ng/mL Final   06/16/2022 6.88 (H) 0.00 - 4.00 ng/mL Final     Comment:     When the Total PSA is between 3.00 and 10.00 ng/mL, consider  requesting a Free PSA to aid in diagnosis. Results for POC orders placed in visit on 01/24/23   POCT Urinalysis No Micro (Auto)   Result Value Ref Range    Color, UA yellow     Clarity, UA clear     Glucose, UA POC neg     Bilirubin, UA neg     Ketones, UA neg     Spec Grav, UA 1.020     Blood, UA POC small     pH, UA 5.0     Protein, UA POC neg     Urobilinogen, UA 0.2     Leukocytes, UA neg     Nitrite, UA neg        Physical Exam  Vitals:    01/24/23 1243   BP: 136/88   Pulse: 84   Weight: 280 lb (127 kg)   Height: 5' 11\" (1.803 m)     Constitutional: Not in distress. Urologic Exam  Unchanged  Additional findings  None  Remainder the physical exam is normal  Assessment/Medical Necessity-Decision Making  PSA of 7.91  Plan  Prostate biopsies instructions given  Patient is erectile dysfunction will be addressed after biopsy    Greater than 50% of 30 minutes spent consulting patient face-to-face  Orders Placed This Encounter   Procedures    POCT Urinalysis No Micro (Auto)     Orders Placed This Encounter   Medications    sulfamethoxazole-trimethoprim (BACTRIM DS;SEPTRA DS) 800-160 MG per tablet     Sig: Take 1 tablet by mouth 2 times daily for 3 days     Dispense:  6 tablet     Refill:  0     Gorge Saldivar MD       Please note this report has been partially produced using speech recognition software  And may cause contain errors related to that system including grammar, punctuation and spelling as well as words and phrases that may seem inappropriate. If there are questions or concerns please feel free to contact me to clarify.

## 2023-01-25 RX ORDER — SODIUM CHLORIDE 0.9 % (FLUSH) 0.9 %
5-40 SYRINGE (ML) INJECTION EVERY 12 HOURS SCHEDULED
Status: CANCELLED | OUTPATIENT
Start: 2023-01-25

## 2023-01-25 RX ORDER — SODIUM CHLORIDE 9 MG/ML
INJECTION, SOLUTION INTRAVENOUS PRN
Status: CANCELLED | OUTPATIENT
Start: 2023-01-25

## 2023-01-25 RX ORDER — SODIUM CHLORIDE 0.9 % (FLUSH) 0.9 %
5-40 SYRINGE (ML) INJECTION PRN
Status: CANCELLED | OUTPATIENT
Start: 2023-01-25

## 2023-02-07 ENCOUNTER — HOSPITAL ENCOUNTER (OUTPATIENT)
Dept: PREADMISSION TESTING | Age: 58
Discharge: HOME | End: 2023-02-11
Payer: COMMERCIAL

## 2023-02-07 VITALS
HEIGHT: 69 IN | RESPIRATION RATE: 16 BRPM | BODY MASS INDEX: 42.12 KG/M2 | SYSTOLIC BLOOD PRESSURE: 130 MMHG | HEART RATE: 73 BPM | WEIGHT: 284.4 LBS | OXYGEN SATURATION: 97 % | DIASTOLIC BLOOD PRESSURE: 79 MMHG | TEMPERATURE: 98.4 F

## 2023-02-07 LAB
APTT: 28.2 SEC (ref 24.4–36.8)
HCT VFR BLD CALC: 49.1 % (ref 42–52)
HEMOGLOBIN: 15.8 G/DL (ref 14–18)
INR BLD: 1
MCH RBC QN AUTO: 28.2 PG (ref 27–31.3)
MCHC RBC AUTO-ENTMCNC: 32.1 % (ref 33–37)
MCV RBC AUTO: 88 FL (ref 79–92.2)
PDW BLD-RTO: 17.1 % (ref 11.5–14.5)
PLATELET # BLD: 240 K/UL (ref 130–400)
PROTHROMBIN TIME: 13.2 SEC (ref 12.3–14.9)
RBC # BLD: 5.58 M/UL (ref 4.7–6.1)
WBC # BLD: 8.4 K/UL (ref 4.8–10.8)

## 2023-02-07 PROCEDURE — 93005 ELECTROCARDIOGRAM TRACING: CPT | Performed by: FAMILY MEDICINE

## 2023-02-07 PROCEDURE — 85610 PROTHROMBIN TIME: CPT

## 2023-02-07 PROCEDURE — 85730 THROMBOPLASTIN TIME PARTIAL: CPT

## 2023-02-07 PROCEDURE — 85027 COMPLETE CBC AUTOMATED: CPT

## 2023-02-07 ASSESSMENT — ENCOUNTER SYMPTOMS
EYE PAIN: 0
CHOKING: 0
NAUSEA: 0
ABDOMINAL DISTENTION: 0
SINUS PAIN: 0
APNEA: 0
SINUS PRESSURE: 0
DIARRHEA: 0
VOMITING: 0
EYE ITCHING: 0
WHEEZING: 0
RHINORRHEA: 0
CHEST TIGHTNESS: 0
SHORTNESS OF BREATH: 0
TROUBLE SWALLOWING: 0
PHOTOPHOBIA: 0
BACK PAIN: 0
ABDOMINAL PAIN: 0
EYE DISCHARGE: 0
CONSTIPATION: 0
COUGH: 0
FACIAL SWELLING: 0
SORE THROAT: 0
BLOOD IN STOOL: 0
ALLERGIC/IMMUNOLOGIC NEGATIVE: 1

## 2023-02-07 NOTE — H&P
PRE ADMISSION TESTING    HISTORY AND PHYSICAL EXAM    PATIENT NAME:  Osmel Denson    YOB: 1965  MRN:  [id number]  SERVICE DATE:  2/7/2023     Primary Care Physician: Stacy Zapata MD   Surgeon: Dr. lAta Chung:  Elevated PSA    The patient is a 59 y.o. male with significant past medical history of elevated PSA, DM, HTN, Sleep Apnea, hx pulmonary embolism and hx of DVT who presents for a preoperative consultation at the request of surgeon, . Dr. Marlin Candelario, who plans on performing transrectal ultrasound guided prostate biospy on 2/8/23 at [facility] [place]. The current problem has been occurring for several years. His PSA levels have been elevated for a couple of years but he was only under surveillance because the level would go down and then back up but and was inconsistent. Now his PSA is elevated and is remaining elevated. Patient denies any signs or symptoms related to the elevated PSA. A couple of years ago, he woke up and he couldn't catch his breath. He called an ambulance who transported him to the nearest ER. He was dx with pulmonary embolism. He had the surgery to remove the embolism and he has been on a blood thinner since then. He had a clot in his leg but that disappeared after the medication. He is followed by a hematologist, Dr. Roshni Solis with [facility] but is now seeing his partner Dr. Josselin Slade at the same office, every 6 months. His last appointment was approx 2-3 months ago. Patient was instructed by Dr. Amber Abraham to stop Eliquis as of Saturday 2/4/23. His last dose was 2/3/23.     Planned Anesthesia: MAC  Known Anesthesia Problems: None  Patient Objection to Receiving Blood Products: No  Personal of FH of DVT/PE: No    Medical/Cardiac Clearance Needed: Not Required    ALLERGIES: Quinolones    PAST MEDICAL HISTORY:    Past Medical History:   Diagnosis Date    Abnormal EKG 12/03/2021    Chronic back pain     Diabetes ([facility] [address]     GERD (gastroesophageal reflux disease)     High cholesterol     History of DVT (deep vein thrombosis) 06/03/2021    History of pulmonary embolism 06/03/2021    Hx of blood clots     Hypertension     IBS (irritable bowel syndrome)     Kidney, malrotation     Sleep apnea      PAST SURGICAL HISTORY:    Past Surgical History:   Procedure Laterality Date    COLONOSCOPY      ENDOSCOPY, COLON, DIAGNOSTIC      PULMONARY EMBOLISM SURGERY  2021     FAMILY HISTORY:    Family History   Problem Relation Age of Onset    Asthma Mother     Heart Disease Father     Other Sister         Sarcoidosis    Cirrhosis Sister      SOCIAL HISTORY:    Social History     Socioeconomic History    Marital status: Single     Spouse name: Not on file    Number of children: Not on file    Years of education: Not on file    Highest education level: Not on file   Occupational History    Not on file   Tobacco Use    Smoking status: Never     Passive exposure: Never    Smokeless tobacco: Never   Vaping Use    Vaping Use: Never used   Substance and Sexual Activity    Alcohol use: No    Drug use: No    Sexual activity: Yes   Other Topics Concern    Not on file   Social History Narrative    Not on file     Social Determinants of Health     Financial Resource Strain: Low Risk     Difficulty of Paying Living Expenses: Not hard at all   Food Insecurity: No Food Insecurity    Worried About Running Out of Food in the Last Year: Never true    Ran Out of Food in the Last Year: Never true   Transportation Needs: Not on file   Physical Activity: Unknown    Days of Exercise per Week: 1 day    Minutes of Exercise per Session: Not on file   Stress: Not on file   Social Connections: Not on file   Intimate Partner Violence: Not At Risk    Fear of Current or Ex-Partner: No    Emotionally Abused: No    Physically Abused: No    Sexually Abused: No   Housing Stability: Not on file     MEDICATIONS:   Prior to Admission medications    Medication Sig Start Date End Date Taking? Authorizing Provider   lisinopril (PRINIVIL;ZESTRIL) 5 MG tablet TAKE 1/2 TABLET BY MOUTH DAILY 1/9/23   Lily Womack MD   metFORMIN (GLUCOPHAGE) 1000 MG tablet TAKE 1 TABLET BY MOUTH 2 TIMES DAILY (WITH MEALS) 12/12/22   Kelli Ortiz MD   testosterone cypionate (DEPOTESTOTERONE CYPIONATE) 200 MG/ML injection 1MLS INJECT EVERY 2 WEEKS 11/28/22 2/7/23  Rush Yuan MD   JANUVIA 100 MG tablet TAKE 1 TABLET BY MOUTH DAILY 11/15/22   Kelli Ortiz MD   apixaban (ELIQUIS) 5 MG TABS tablet TAKE 1 TABLET BY MOUTH 2 TIMES DAILY 10/31/22   Alma Mix, APRN - CNP   Lancets ([address]) [address] [facility] TEST TWICE DAILY 10/5/22   Kelli Ortiz MD   SYRINGE-NEEDLE, DISP, 3 ML (B-D INTEGRA SYRINGE) 22G X 1-1/2\" 3 ML MISC 1 each by Does not apply route daily 8/26/22   Kelli Ortiz MD   blood glucose test strips (ONETOUCH VERIO) strip 1 each by In Vitro route 2 times daily As needed. 8/26/22   Kelli Ortiz MD   famotidine (PEPCID) 20 MG tablet Take 20 mg by mouth 2 times daily    Historical Provider, MD   Esomeprazole Magnesium (NEXIUM PO) Take by mouth  Patient not taking: Reported on 2/7/2023    Historical Provider, MD   Blood Glucose Monitoring Suppl (Bimal Ruby) w/Device KIT As directed 4/29/22   Kelli Ortiz MD   Respiratory Therapy Supplies ROSALIND New Full face CPAP mask and supplies. 6/30/21   Shannan Patel MD   CPAP Machine MISC by Does not apply route New CPAP with 11 cm 4/26/21   Shannan Patel MD         REVIEW OF SYSTEM:   Review of Systems   Constitutional:  Negative for activity change, appetite change, chills, fatigue, fever and unexpected weight change. HENT:  Negative for congestion, dental problem, ear discharge, ear pain, facial swelling, hearing loss, mouth sores, nosebleeds, rhinorrhea, sinus pressure, sinus pain, sneezing, sore throat, tinnitus and trouble swallowing. Eyes:  Negative for photophobia, pain, discharge, itching and visual disturbance.         Wears prescription glasses Respiratory:  Negative for apnea, cough, choking, chest tightness, shortness of breath and wheezing. Cardiovascular:  Negative for chest pain, palpitations and leg swelling. Gastrointestinal:  Negative for abdominal distention, abdominal pain, blood in stool, constipation, diarrhea, nausea and vomiting. Endocrine: Negative for cold intolerance and heat intolerance. Genitourinary:  Negative for decreased urine volume, difficulty urinating, dysuria, frequency, hematuria and urgency. Musculoskeletal:  Negative for arthralgias, back pain, gait problem, joint swelling, myalgias, neck pain and neck stiffness. Skin:  Negative for rash and wound. Allergic/Immunologic: Negative. Neurological:  Negative for dizziness, tremors, seizures, syncope, speech difficulty, weakness, light-headedness, numbness and headaches. Hematological:  Bruises/bleeds easily (on eliquis). Psychiatric/Behavioral: Negative. OBJECTIVE  PHYSICAL EXAM:   Physical Exam  Vitals reviewed. Constitutional:       General: He is not in acute distress. Appearance: Normal appearance. He is not ill-appearing, toxic-appearing or diaphoretic. HENT:      Head: Normocephalic. Right Ear: Ear canal and external ear normal. There is impacted cerumen. Left Ear: Tympanic membrane, ear canal and external ear normal. There is no impacted cerumen. Nose: Nose normal. No congestion or rhinorrhea. Mouth/Throat:      Mouth: Mucous membranes are moist.      Pharynx: Oropharynx is clear. No oropharyngeal exudate or posterior oropharyngeal erythema. Eyes:      General:         Right eye: No discharge. Left eye: No discharge. Extraocular Movements: Extraocular movements intact. Conjunctiva/sclera: Conjunctivae normal.      Pupils: Pupils are equal, round, and reactive to light. Comments: Wears prescription glasses   Cardiovascular:      Rate and Rhythm: Normal rate and regular rhythm.       Pulses: Normal pulses. Heart sounds: Normal heart sounds. Pulmonary:      Effort: Pulmonary effort is normal. No respiratory distress. Breath sounds: Normal breath sounds. No stridor. No wheezing, rhonchi or rales. Chest:      Chest wall: No tenderness. Abdominal:      General: Abdomen is flat. Bowel sounds are normal. There is no distension. Palpations: Abdomen is soft. There is no mass. Tenderness: There is no abdominal tenderness. There is no guarding or rebound. Hernia: No hernia is present. Genitourinary:     Comments: Deferred  Musculoskeletal:         General: No swelling, tenderness, deformity or signs of injury. Normal range of motion. Cervical back: Normal range of motion and neck supple. No rigidity or tenderness. Right lower leg: No edema. Left lower leg: No edema. Lymphadenopathy:      Cervical: No cervical adenopathy. Skin:     General: Skin is warm and dry. Capillary Refill: Capillary refill takes less than 2 seconds. Coloration: Skin is not jaundiced or pale. Findings: No bruising, erythema, lesion or rash. Neurological:      General: No focal deficit present. Mental Status: He is alert and oriented to person, place, and time. Cranial Nerves: No cranial nerve deficit. Sensory: No sensory deficit. Motor: No weakness. Coordination: Coordination normal.      Gait: Gait normal.   Psychiatric:         Mood and Affect: Mood normal.         Behavior: Behavior normal.         Thought Content: Thought content normal.         Judgment: Judgment normal.        /79   Pulse 73   Temp 98.4 °F (36.9 °C) (Temporal)   Resp 16   Ht 5' 9\" (1.753 m)   Wt 284 lb 6.4 oz (129 kg)   SpO2 97%   BMI 42.00 kg/m²       Diagnostic tests reviewed for today's visit:      IMAGING:  No results found.     ASSESSMENT  Patient Active Problem List   Diagnosis    ROSA on CPAP    Acute saddle pulmonary embolism with acute cor pulmonale (HCC) History of pulmonary embolism    History of DVT (deep vein thrombosis)    Deep vein thrombosis (DVT) of right lower extremity (HCC)    Obesity (BMI 30-39. 9)    Abnormal EKG    Elevated PSA    DM type 2, goal A1C to be determined (HCC)    Essential hypertension, benign    Gastroesophageal reflux disease without esophagitis         Plan:  Preoperative workup as follows: CBC, PT/INR, EKG  2.   Scheduled for: transrectal ultrasound guided prostate biopsy under MAC anesthesia    SIGNATURE: Phyllis Rivas, APRN - CNP  DATE: February 7, 2023

## 2023-02-08 ENCOUNTER — ANESTHESIA EVENT (OUTPATIENT)
Dept: OPERATING ROOM | Age: 58
End: 2023-02-08
Payer: COMMERCIAL

## 2023-02-08 ENCOUNTER — HOSPITAL ENCOUNTER (OUTPATIENT)
Age: 58
Setting detail: OUTPATIENT SURGERY
Discharge: HOME | End: 2023-02-08
Attending: UROLOGY | Admitting: UROLOGY
Payer: COMMERCIAL

## 2023-02-08 ENCOUNTER — ANESTHESIA (OUTPATIENT)
Dept: OPERATING ROOM | Age: 58
End: 2023-02-08
Payer: COMMERCIAL

## 2023-02-08 VITALS
RESPIRATION RATE: 16 BRPM | OXYGEN SATURATION: 95 % | TEMPERATURE: 97.7 F | DIASTOLIC BLOOD PRESSURE: 59 MMHG | SYSTOLIC BLOOD PRESSURE: 117 MMHG | HEART RATE: 83 BPM

## 2023-02-08 DIAGNOSIS — R97.20 ELEVATED PSA: ICD-10-CM

## 2023-02-08 DIAGNOSIS — E11.69 TYPE 2 DIABETES MELLITUS WITH OTHER SPECIFIED COMPLICATION, UNSPECIFIED WHETHER LONG TERM INSULIN USE (HCC): ICD-10-CM

## 2023-02-08 LAB
EKG ATRIAL RATE: 76 BPM
EKG P AXIS: 28 DEGREES
EKG P-R INTERVAL: 164 MS
EKG Q-T INTERVAL: 362 MS
EKG QRS DURATION: 88 MS
EKG QTC CALCULATION (BAZETT): 407 MS
EKG R AXIS: 2 DEGREES
EKG T AXIS: 52 DEGREES
EKG VENTRICULAR RATE: 76 BPM
GLUCOSE BLD-MCNC: 129 MG/DL (ref 70–99)
PERFORMED ON: ABNORMAL

## 2023-02-08 PROCEDURE — 2709999900 HC NON-CHARGEABLE SUPPLY: Performed by: UROLOGY

## 2023-02-08 PROCEDURE — 3600000013 HC SURGERY LEVEL 3 ADDTL 15MIN: Performed by: UROLOGY

## 2023-02-08 PROCEDURE — 6360000002 HC RX W HCPCS: Performed by: UROLOGY

## 2023-02-08 PROCEDURE — 3600000003 HC SURGERY LEVEL 3 BASE: Performed by: UROLOGY

## 2023-02-08 PROCEDURE — 2580000003 HC RX 258: Performed by: UROLOGY

## 2023-02-08 PROCEDURE — 88305 TISSUE EXAM BY PATHOLOGIST: CPT

## 2023-02-08 PROCEDURE — 3700000001 HC ADD 15 MINUTES (ANESTHESIA): Performed by: UROLOGY

## 2023-02-08 PROCEDURE — 3700000000 HC ANESTHESIA ATTENDED CARE: Performed by: UROLOGY

## 2023-02-08 PROCEDURE — 6360000002 HC RX W HCPCS: Performed by: NURSE ANESTHETIST, CERTIFIED REGISTERED

## 2023-02-08 PROCEDURE — 93010 ELECTROCARDIOGRAM REPORT: CPT | Performed by: INTERNAL MEDICINE

## 2023-02-08 PROCEDURE — 7100000010 HC PHASE II RECOVERY - FIRST 15 MIN: Performed by: UROLOGY

## 2023-02-08 RX ORDER — SODIUM CHLORIDE 9 MG/ML
INJECTION, SOLUTION INTRAVENOUS PRN
Status: DISCONTINUED | OUTPATIENT
Start: 2023-02-08 | End: 2023-02-08 | Stop reason: HOSPADM

## 2023-02-08 RX ORDER — MEPERIDINE HYDROCHLORIDE 25 MG/ML
12.5 INJECTION INTRAMUSCULAR; INTRAVENOUS; SUBCUTANEOUS
Status: DISCONTINUED | OUTPATIENT
Start: 2023-02-08 | End: 2023-02-08 | Stop reason: HOSPADM

## 2023-02-08 RX ORDER — SODIUM CHLORIDE 0.9 % (FLUSH) 0.9 %
5-40 SYRINGE (ML) INJECTION EVERY 12 HOURS SCHEDULED
Status: DISCONTINUED | OUTPATIENT
Start: 2023-02-08 | End: 2023-02-08 | Stop reason: HOSPADM

## 2023-02-08 RX ORDER — METOCLOPRAMIDE HYDROCHLORIDE 5 MG/ML
10 INJECTION INTRAMUSCULAR; INTRAVENOUS
Status: DISCONTINUED | OUTPATIENT
Start: 2023-02-08 | End: 2023-02-08 | Stop reason: HOSPADM

## 2023-02-08 RX ORDER — ONDANSETRON 2 MG/ML
4 INJECTION INTRAMUSCULAR; INTRAVENOUS
Status: DISCONTINUED | OUTPATIENT
Start: 2023-02-08 | End: 2023-02-08 | Stop reason: HOSPADM

## 2023-02-08 RX ORDER — OXYCODONE HYDROCHLORIDE 5 MG/1
5 TABLET ORAL
Status: DISCONTINUED | OUTPATIENT
Start: 2023-02-08 | End: 2023-02-08 | Stop reason: HOSPADM

## 2023-02-08 RX ORDER — SODIUM CHLORIDE 0.9 % (FLUSH) 0.9 %
5-40 SYRINGE (ML) INJECTION PRN
Status: DISCONTINUED | OUTPATIENT
Start: 2023-02-08 | End: 2023-02-08 | Stop reason: HOSPADM

## 2023-02-08 RX ORDER — PROPOFOL 10 MG/ML
INJECTION, EMULSION INTRAVENOUS PRN
Status: DISCONTINUED | OUTPATIENT
Start: 2023-02-08 | End: 2023-02-08 | Stop reason: SDUPTHER

## 2023-02-08 RX ORDER — SODIUM CHLORIDE 9 MG/ML
25 INJECTION, SOLUTION INTRAVENOUS PRN
Status: DISCONTINUED | OUTPATIENT
Start: 2023-02-08 | End: 2023-02-08 | Stop reason: HOSPADM

## 2023-02-08 RX ORDER — DIPHENHYDRAMINE HYDROCHLORIDE 50 MG/ML
12.5 INJECTION INTRAMUSCULAR; INTRAVENOUS
Status: DISCONTINUED | OUTPATIENT
Start: 2023-02-08 | End: 2023-02-08 | Stop reason: HOSPADM

## 2023-02-08 RX ORDER — FENTANYL CITRATE 0.05 MG/ML
50 INJECTION, SOLUTION INTRAMUSCULAR; INTRAVENOUS EVERY 10 MIN PRN
Status: DISCONTINUED | OUTPATIENT
Start: 2023-02-08 | End: 2023-02-08 | Stop reason: HOSPADM

## 2023-02-08 RX ORDER — SITAGLIPTIN 100 MG/1
TABLET, FILM COATED ORAL
Qty: 30 TABLET | Refills: 3 | Status: SHIPPED | OUTPATIENT
Start: 2023-02-08

## 2023-02-08 RX ADMIN — PROPOFOL 50 MG: 10 INJECTION, EMULSION INTRAVENOUS at 08:10

## 2023-02-08 RX ADMIN — SODIUM CHLORIDE: 9 INJECTION, SOLUTION INTRAVENOUS at 07:17

## 2023-02-08 RX ADMIN — PROPOFOL 100 MG: 10 INJECTION, EMULSION INTRAVENOUS at 08:06

## 2023-02-08 RX ADMIN — GENTAMICIN SULFATE 120 MG: 40 INJECTION, SOLUTION INTRAMUSCULAR; INTRAVENOUS at 08:04

## 2023-02-08 RX ADMIN — SODIUM CHLORIDE: 9 INJECTION, SOLUTION INTRAVENOUS at 07:57

## 2023-02-08 RX ADMIN — PROPOFOL 50 MG: 10 INJECTION, EMULSION INTRAVENOUS at 08:08

## 2023-02-08 ASSESSMENT — PAIN - FUNCTIONAL ASSESSMENT: PAIN_FUNCTIONAL_ASSESSMENT: 0-10

## 2023-02-08 ASSESSMENT — PAIN SCALES - GENERAL
PAINLEVEL_OUTOF10: 0
PAINLEVEL_OUTOF10: 0

## 2023-02-08 NOTE — ANESTHESIA PRE PROCEDURE
Department of Anesthesiology  Preprocedure Note       Name:  Arti Latif   Age:  59 y.o.  :  1965                                          MRN:  [id number]         Date:  2023      Surgeon: Bia Matson):  Faustino Chavarria MD    Procedure: Procedure(s):  TRANSRECTAL ULTRASOUND GUIDED PROSTATE BIOPSY, MAC ANESTHESIA,    Medications prior to admission:   Prior to Admission medications    Medication Sig Start Date End Date Taking? Authorizing Provider   lisinopril (PRINIVIL;ZESTRIL) 5 MG tablet TAKE 1/2 TABLET BY MOUTH DAILY 23   Feli Kelley MD   metFORMIN (GLUCOPHAGE) 1000 MG tablet TAKE 1 TABLET BY MOUTH 2 TIMES DAILY (WITH MEALS) 22   Florence Ken MD   testosterone cypionate (DEPOTESTOTERONE CYPIONATE) 200 MG/ML injection 1MLS INJECT EVERY 2 WEEKS 22  Rush Yuan MD   JANUVIA 100 MG tablet TAKE 1 TABLET BY MOUTH DAILY 11/15/22   Florence Ken MD   apixaban (ELIQUIS) 5 MG TABS tablet TAKE 1 TABLET BY MOUTH 2 TIMES DAILY 10/31/22   Guille Everts Repko, APRN - CNP   Lancets ([address]) [address] [facility] TEST TWICE DAILY 10/5/22   Florence Ken MD   SYRINGE-NEEDLE, DISP, 3 ML (B-D INTEGRA SYRINGE) 22G X 1-1/2\" 3 ML MISC 1 each by Does not apply route daily 22   Florence Ken MD   blood glucose test strips (ONETOUCH VERIO) strip 1 each by In Vitro route 2 times daily As needed. 22   Florence Ken MD   famotidine (PEPCID) 20 MG tablet Take 20 mg by mouth 2 times daily    Historical Provider, MD   Blood Glucose Monitoring Suppl ([facility]) w/Device KIT As directed 22   Florence Ken MD   Respiratory Therapy Supplies ROSALIND New Full face CPAP mask and supplies.  21   Mabel Moya MD   CPAP Machine MISC by Does not apply route New CPAP with 11 cm 21   Mabel Moya MD       Current medications:    Current Facility-Administered Medications   Medication Dose Route Frequency Provider Last Rate Last Admin    sodium chloride flush 0.9 % injection 5-40 mL  5-40 mL IntraVENous 2 times per day Alan Tolbert MD        sodium chloride flush 0.9 % injection 5-40 mL  5-40 mL IntraVENous PRN Alan Tolbert MD        0.9 % sodium chloride infusion   IntraVENous PRN Alan Tolbert  mL/hr at 02/08/23 0717 New Bag at 02/08/23 0717    gentamicin (GARAMYCIN) 120 mg in sodium chloride 0.9 % 100 mL IVPB  120 mg IntraVENous On Call to Hoda Bernardo MD           Allergies: Allergies   Allergen Reactions    Quinolones        Problem List:    Patient Active Problem List   Diagnosis Code    ROSA on CPAP G47.33, Z99.89    Acute saddle pulmonary embolism with acute cor pulmonale (HCC) I26.02    History of pulmonary embolism Z86.711    History of DVT (deep vein thrombosis) Z86.718    Deep vein thrombosis (DVT) of right lower extremity (HCC) I82.401    Obesity (BMI 30-39. 9) E66.9    Abnormal EKG R94.31    Elevated PSA R97.20    DM type 2, goal A1C to be determined ([facility] [address] E11.9    Essential hypertension, benign I10    Gastroesophageal reflux disease without esophagitis K21.9       Past Medical History:        Diagnosis Date    Abnormal EKG 12/03/2021    Chronic back pain     Diabetes ([facility] [address]     GERD (gastroesophageal reflux disease)     High cholesterol     History of DVT (deep vein thrombosis) 06/03/2021    History of pulmonary embolism 06/03/2021    Hx of blood clots     Hypertension     IBS (irritable bowel syndrome)     Kidney, malrotation     Sleep apnea        Past Surgical History:        Procedure Laterality Date    COLONOSCOPY      ENDOSCOPY, COLON, DIAGNOSTIC      PULMONARY EMBOLISM SURGERY  2021       Social History:    Social History     Tobacco Use    Smoking status: Never     Passive exposure: Never    Smokeless tobacco: Never   Substance Use Topics    Alcohol use:  No                                Counseling given: Not Answered      Vital Signs (Current):   Vitals:    02/08/23 0706   BP: (!) 150/79   Pulse: 93   Resp: 16   Temp: 98.9 °F (37.2 °C)   TempSrc: Temporal   SpO2: 96%                                              BP Readings from Last 3 Encounters:   02/08/23 (!) 150/79   02/07/23 130/79   01/24/23 136/88       NPO Status: Time of last liquid consumption: 2200                        Time of last solid consumption: 1700                        Date of last liquid consumption: 02/07/23                        Date of last solid food consumption: 02/07/23    BMI:   Wt Readings from Last 3 Encounters:   02/07/23 284 lb 6.4 oz (129 kg)   01/24/23 280 lb (127 kg)   12/22/22 282 lb (127.9 kg)     There is no height or weight on file to calculate BMI.    CBC:   Lab Results   Component Value Date/Time    WBC 8.4 02/07/2023 09:26 AM    RBC 5.58 02/07/2023 09:26 AM    HGB 15.8 02/07/2023 09:26 AM    HCT 49.1 02/07/2023 09:26 AM    MCV 88.0 02/07/2023 09:26 AM    RDW 17.1 02/07/2023 09:26 AM     02/07/2023 09:26 AM       CMP:   Lab Results   Component Value Date/Time     12/01/2022 09:07 AM    K 4.1 12/01/2022 09:07 AM     12/01/2022 09:07 AM    CO2 26 12/01/2022 09:07 AM    BUN 19 12/01/2022 09:07 AM    CREATININE 0.95 12/01/2022 09:07 AM    GFRAA >60.0 06/03/2022 09:18 AM    LABGLOM >60.0 12/01/2022 09:07 AM    GLUCOSE 91 12/05/2022 12:50 PM    PROT 7.8 04/21/2021 08:56 AM    CALCIUM 9.6 12/01/2022 09:07 AM    BILITOT 0.7 04/21/2021 08:56 AM    ALKPHOS 77 04/21/2021 08:56 AM    AST 20 04/21/2021 08:56 AM    ALT 35 04/21/2021 08:56 AM       POC Tests: No results for input(s): POCGLU, POCNA, POCK, POCCL, POCBUN, POCHEMO, POCHCT in the last 72 hours.     Coags:   Lab Results   Component Value Date/Time    PROTIME 13.2 02/07/2023 09:45 AM    INR 1.0 02/07/2023 09:45 AM    APTT 28.2 02/07/2023 09:45 AM       HCG (If Applicable): No results found for: PREGTESTUR, PREGSERUM, HCG, HCGQUANT     ABGs:   Lab Results   Component Value Date/Time    PHART 7.355 04/21/2021 01:22 PM    PO2ART 27 04/21/2021 01:22 PM    FQP8JAF 37 04/21/2021 01:22 PM    GMN0SKZ 20.5 04/21/2021 01:22 PM    BEART -5 04/21/2021 01:22 PM    F0XURIBZ 47 04/21/2021 01:22 PM        Type & Screen (If Applicable):  No results found for: LABABO, LABRH    Drug/Infectious Status (If Applicable):  No results found for: HIV, HEPCAB    COVID-19 Screening (If Applicable):   Lab Results   Component Value Date/Time    COVID19 Not Detected 04/21/2021 08:56 AM           Anesthesia Evaluation  Patient summary reviewed and Nursing notes reviewed no history of anesthetic complications:   Airway: Mallampati: II  TM distance: >3 FB   Neck ROM: full  Mouth opening: > = 3 FB   Dental: normal exam         Pulmonary:normal exam    (+) sleep apnea:                             Cardiovascular:    (+) hypertension:,       ECG reviewed                        Neuro/Psych:   Negative Neuro/Psych ROS              GI/Hepatic/Renal:   (+) GERD:, morbid obesity          Endo/Other:    (+) Diabetes, . Abdominal:   (+) obese,           Vascular: negative vascular ROS. Other Findings:           Anesthesia Plan      MAC     ASA 3             Anesthetic plan and risks discussed with patient. Plan discussed with CRNA.     Attending anesthesiologist reviewed and agrees with Preprocedure content                Claudean Hahn, MD   2/8/2023

## 2023-02-08 NOTE — BRIEF OP NOTE
Brief Postoperative Note      Patient: Kris Angel  YOB: 1965  MRN: [id number]    Date of Procedure: 2/8/2023    Pre-Op Diagnosis: Elevated PSA [R97.20] 7.91    Post-Op Diagnosis: Same       Procedure(s):  TRANSRECTAL ULTRASOUND GUIDED PROSTATE BIOPSY    Surgeon(s):  Mallorie Donohue MD    Assistant:  * No surgical staff found *    Anesthesia: Monitor Anesthesia Care    Estimated Blood Loss (mL): Minimal    Complications: None    Specimens:   ID Type Source Tests Collected by Time Destination   A : A. RT BASE PROSTATE Tissue Prostate SURGICAL PATHOLOGY Mallorie Donohue MD 2/8/2023 0740    B : B. RT LAT BASE PROSTATE Tissue Prostate SURGICAL PATHOLOGY Mallorie Donohue MD 2/8/2023 0740    C : C. RT MEDIAL PROSTATE Tissue Prostate SURGICAL PATHOLOGY Mallorie Donohue MD 2/8/2023 0740    D : D. RT LAT MID PROSTATE Tissue Prostate SURGICAL PATHOLOGY Mallorie Donohue MD 2/8/2023 0740    E : E. RT APEX PROSTATE Tissue Prostate SURGICAL PATHOLOGY Mallorie Donohue MD 2/8/2023 0740    F : F. RT. LAT APEX PROSTATE Tissue Prostate SURGICAL PATHOLOGY Mallorie Donohue MD 2/8/2023 0740    G Ovidio Kenny LT BASE PROSTATE Tissue Prostate SURGICAL PATHOLOGY Mallorie Donohue MD 2/8/2023 0740    H : H. LT LAT BASE PROSTATE Tissue Prostate SURGICAL PATHOLOGY Mallorie Donohue MD 2/8/2023 0740    I : I. LT MEDIAL PROSTATE Tissue Prostate SURGICAL PATHOLOGY Mallorie Donohue MD 2/8/2023 0740    J : J. LT LAT MID PROSTATE Tissue Prostate SURGICAL PATHOLOGY Mallorie Donohue MD 2/8/2023 0740    K : K. LT APEX PROSTATE Tissue Prostate SURGICAL PATHOLOGY Mallorie Donohue MD 2/8/2023 0740    L : L. LT LAT APEX PROSTATE Tissue Prostate SURGICAL PATHOLOGY Mallorie Donohue MD 2/8/2023 0740        Implants:  * No implants in log *      Drains: * No LDAs found *    Findings: 95.4 cc PV    Electronically signed by Mallorie Donohue MD on 2/8/2023 at 8:08 AM

## 2023-02-08 NOTE — ANESTHESIA POSTPROCEDURE EVALUATION
Department of Anesthesiology  Postprocedure Note    Patient: Osmel Denson  MRN: [id number]  YOB: 1965  Date of evaluation: 2/8/2023      Procedure Summary     Date: 02/08/23 Room / Location: [facility]    Anesthesia Start: Conception Self Anesthesia Stop: 9266    Procedure: TRANSRECTAL ULTRASOUND GUIDED PROSTATE BIOPSY Diagnosis:       Elevated PSA      (Elevated PSA [R97.20])    Surgeons: Steve Lynn MD Responsible Provider: Ronna Nelson MD    Anesthesia Type: MAC ASA Status: 3          Anesthesia Type: No value filed.     Pearl Phase I: Pearl Score: 10    Pearl Phase II:        Anesthesia Post Evaluation    Patient location during evaluation: bedside  Patient participation: complete - patient participated  Level of consciousness: awake  Airway patency: patent  Nausea & Vomiting: no nausea and no vomiting  Complications: no  Cardiovascular status: blood pressure returned to baseline  Respiratory status: acceptable  Hydration status: euvolemic

## 2023-02-08 NOTE — DISCHARGE INSTRUCTIONS
32 extra ounces of fluid per day for the next 3 days  Not unusual to have blood in the urine/blood from the rectum 24 hours  Resume all preop medications including Eliquis  Follow-up will be arranged by my office

## 2023-02-09 NOTE — OP NOTE
[address]                      [address], [address] [facility]                                OPERATIVE REPORT    PATIENT NAME: Shlomo Huertas                       :        1965  MED REC NO:   [id number]                            ROOM:  ACCOUNT NO:   [de-identified]                           ADMIT DATE: 2023  PROVIDER:     Reyna Burger MD    DATE OF PROCEDURE:  2023    PREOPERATIVE DIAGNOSIS:  Elevated PSA of 7.91. POSTOPERATIVE DIAGNOSIS:  Elevated PSA of 7.91.    OPERATION PERFORMED:  Transrectal ultrasound-guided prostate biopsy. SURGEON:  Christi Echeverria. Jacklyn Gomez MD    ANESTHESIA:  MAC.    ESTIMATED BLOOD LOSS:  Not applicable. INDICATIONS:  The patient is a 57-year-old male with rising PSA of 7.91  with a normal prostate exam other than mild BPH. The patient will  undergo prostate biopsies under anesthesia. OPERATIVE NOTE:  The patient has been on IV and p.o. antibiotics. He  was taken to the operating room and left on the cart. He was placed in  lateral decubitus position. Then sedation was induced. Ultrasound  probe was placed into the rectum. Images of prostate were obtained and  prostate volume was calculated at 95.4 mL. Then, under ultrasound  guidance 12 biopsies were obtained. There were labeled right lateral  base, right lateral mid base, right lateral apex, right base, right mid  base, right apex; left lateral base, left lateral mid base, left lateral  apex, left base, left mid base, left apex. The patient tolerated  procedure well. There were no complications.         Manan Bond MD    D: 2023 10:28:04       T: 2023 10:30:23     [email or web address]  Job#: [id number]     Doc#: [id number]    CC:

## 2023-02-14 RX ORDER — SULFAMETHOXAZOLE AND TRIMETHOPRIM 800; 160 MG/1; MG/1
1 TABLET ORAL 2 TIMES DAILY
Qty: 14 TABLET | Refills: 0 | Status: SHIPPED | OUTPATIENT
Start: 2023-02-14 | End: 2023-02-21

## 2023-02-16 ENCOUNTER — OFFICE VISIT (OUTPATIENT)
Dept: UROLOGY | Age: 58
End: 2023-02-16

## 2023-02-16 VITALS
HEART RATE: 83 BPM | HEIGHT: 71 IN | BODY MASS INDEX: 39.2 KG/M2 | WEIGHT: 280 LBS | OXYGEN SATURATION: 97 % | SYSTOLIC BLOOD PRESSURE: 148 MMHG | DIASTOLIC BLOOD PRESSURE: 88 MMHG

## 2023-02-16 DIAGNOSIS — R31.29 MICROHEMATURIA: ICD-10-CM

## 2023-02-16 DIAGNOSIS — R97.20 ELEVATED PSA: Primary | ICD-10-CM

## 2023-02-16 LAB
BILIRUBIN, POC: ABNORMAL
BLOOD URINE, POC: ABNORMAL
CLARITY, POC: CLEAR
COLOR, POC: YELLOW
GLUCOSE URINE, POC: ABNORMAL
KETONES, POC: ABNORMAL
LEUKOCYTE EST, POC: ABNORMAL
NITRITE, POC: ABNORMAL
PH, POC: 5
PROTEIN, POC: ABNORMAL
SPECIFIC GRAVITY, POC: 1.01
UROBILINOGEN, POC: 0.2

## 2023-02-16 NOTE — PROGRESS NOTES
[facility] EVALUATION NOTE                                                 H&P          Note:  Assessment and plan  Gross hematuria after prostate biopsy  All prostate biopsies were negative  Currently urinalysis is clear  Patient will continue with Bactrim DS 1 p.o. twice daily which she has been on since the 14th  Urine culture sent      The note below is complete evaluation of patient on follow-up/consultation                                                                                                                                                 Reason for Visit  Hematuria following prostate biopsy    History of Present Illness  49-year-old male status post prostate biopsy with all biopsies showing no evidence of malignancy  Postop bleeding secondary to resuming Eliquis  Stopped Eliquis and the hematuria is cleared  Patient instructed to restart Eliquis  He he has also been taking Bactrim DS 1 p.o. twice daily since 2/14/2023      Urologic Review of Systems/Symptoms  Improved    Review of Systems  Hospitalization: None recent  All 14 categories of Review of Systems otherwise reviewed no other findings reported.   No change  Past Medical History:   Diagnosis Date    Abnormal EKG 12/03/2021    Chronic back pain     Diabetes (HCC)     GERD (gastroesophageal reflux disease)     High cholesterol     History of DVT (deep vein thrombosis) 06/03/2021    History of pulmonary embolism 06/03/2021    Hx of blood clots     Hypertension     IBS (irritable bowel syndrome)     Kidney, malrotation     Sleep apnea      Past Surgical History:   Procedure Laterality Date    COLONOSCOPY      ENDOSCOPY, COLON, DIAGNOSTIC      PROSTATE SURGERY N/A 2/8/2023    TRANSRECTAL ULTRASOUND GUIDED PROSTATE BIOPSY performed by Selam Romero MD at [address]  2021     Social History     Socioeconomic History    Marital status: Single     Spouse name: None    Number of children: None    Years of education: None    Highest education level: None   Tobacco Use    Smoking status: Never     Passive exposure: Never    Smokeless tobacco: Never   Vaping Use    Vaping Use: Never used   Substance and Sexual Activity    Alcohol use: No    Drug use: No    Sexual activity: Yes     Social Determinants of Health     Financial Resource Strain: Low Risk     Difficulty of Paying Living Expenses: Not hard at all   Food Insecurity: No Food Insecurity    Worried About Running Out of Food in the Last Year: Never true    Ran Out of Food in the Last Year: Never true   Physical Activity: Unknown    Days of Exercise per Week: 1 day   Intimate Partner Violence: Not At Risk    Fear of Current or Ex-Partner: No    Emotionally Abused: No    Physically Abused: No    Sexually Abused: No     Family History   Problem Relation Age of Onset    Asthma Mother     Heart Disease Father     Other Sister         Sarcoidosis    Cirrhosis Sister      Current Outpatient Medications   Medication Sig Dispense Refill    sulfamethoxazole-trimethoprim (BACTRIM DS;SEPTRA DS) 800-160 MG per tablet Take 1 tablet by mouth 2 times daily for 7 days 14 tablet 0    JANUVIA 100 MG tablet TAKE 1 TABLET BY MOUTH DAILY 30 tablet 3    lisinopril (PRINIVIL;ZESTRIL) 5 MG tablet TAKE 1/2 TABLET BY MOUTH DAILY 30 tablet 1    metFORMIN (GLUCOPHAGE) 1000 MG tablet TAKE 1 TABLET BY MOUTH 2 TIMES DAILY (WITH MEALS) 60 tablet 6    Lancets (ONETOUCH DELICA PLUS [id number]) MISC TEST TWICE DAILY 100 each 3    SYRINGE-NEEDLE, DISP, 3 ML (B-D INTEGRA SYRINGE) 22G X 1-1/2\" 3 ML MISC 1 each by Does not apply route daily 20 each 6    blood glucose test strips (ONETOUCH VERIO) strip 1 each by In Vitro route 2 times daily As needed. 100 each 3    famotidine (PEPCID) 20 MG tablet Take 20 mg by mouth 2 times daily      Blood Glucose Monitoring Suppl (ONETOUCH VERIO) w/Device KIT As directed 1 kit 1    Respiratory Therapy Supplies ROSALIND New Full face CPAP mask and supplies.  1 Device 0 CPAP Machine MISC by Does not apply route New CPAP with 11 cm 1 each 0    testosterone cypionate (DEPOTESTOTERONE CYPIONATE) 200 MG/ML injection 1MLS INJECT EVERY 2 WEEKS 10 mL 3    apixaban (ELIQUIS) 5 MG TABS tablet TAKE 1 TABLET BY MOUTH 2 TIMES DAILY (Patient not taking: Reported on 2/16/2023) 60 tablet 5     No current facility-administered medications for this visit. Quinolones  All reviewed and verified by Dr Charla Ríos on today's visit    PSA   Date Value Ref Range Status   12/15/2022 7.91 (H) 0.00 - 4.00 ng/mL Final     Comment:     When the Total PSA is between 3.00 and 10.00 ng/mL, consider  requesting a Free PSA to aid in diagnosis. 08/04/2022 6.41 (H) 0.00 - 4.00 ng/mL Final   06/16/2022 6.88 (H) 0.00 - 4.00 ng/mL Final     Comment:     When the Total PSA is between 3.00 and 10.00 ng/mL, consider  requesting a Free PSA to aid in diagnosis. No results found for this visit on 02/16/23. Physical Exam  Vitals:    02/16/23 1309   BP: (!) 148/88   Pulse: 83   SpO2: 97%   Weight: 280 lb (127 kg)   Height: 5' 11\" (1.803 m)     Constitutional: Not in distress. Urologic Exam  Urinalysis no evidence of gross hematuria culture sent  Additional findings  None  Remainder the physical exam is normal  Assessment/Medical Necessity-Decision Making  Hematuria following prostate biopsy probably exacerbated by Eliquis  Plan  Continue Bactrim  Follow-up 6 months with PSA since bleeding is stopped    Greater than 50% of 30 minutes spent consulting patient face-to-face  Orders Placed This Encounter   Procedures    PSA, Diagnostic     Standing Status:   Future     Standing Expiration Date:   2/16/2024     No orders of the defined types were placed in this encounter.     Daphney Cancino MD       Please note this report has been partially produced using speech recognition software  And may cause contain errors related to that system including grammar, punctuation and spelling as well as words and phrases that may seem inappropriate. If there are questions or concerns please feel free to contact me to clarify.

## 2023-02-17 LAB — URINE CULTURE, ROUTINE: NORMAL

## 2023-03-04 DIAGNOSIS — E11.69 TYPE 2 DIABETES MELLITUS WITH OTHER SPECIFIED COMPLICATION, UNSPECIFIED WHETHER LONG TERM INSULIN USE (HCC): ICD-10-CM

## 2023-03-06 RX ORDER — SITAGLIPTIN 100 MG/1
TABLET, FILM COATED ORAL
Qty: 30 TABLET | Refills: 3 | Status: SHIPPED | OUTPATIENT
Start: 2023-03-06

## 2023-03-08 RX ORDER — LISINOPRIL 5 MG/1
TABLET ORAL
Qty: 30 TABLET | Refills: 1 | Status: SHIPPED | OUTPATIENT
Start: 2023-03-08

## 2023-03-08 NOTE — TELEPHONE ENCOUNTER
Future Appointments    Encounter Information    Provider Department Appt Notes   4/12/2023 Jeison Patel MD [facility] Endo 4 month follow up   4/17/2023 Noel Garza MD [facility] 4 Month follow up   5/15/2023 Teresa Toledo [address] [place] [address] Pulmonology 4M F/U   6/15/2023 José Antonio Molina, [address] [place] [address] Cardiology 1 year   8/17/2023 Jose Mehta [address] [place] [address] Urology 6 Mos PSA     Past Visits    Date Provider Specialty Visit Type Primary Dx   02/16/2023 Jose Mehta MD Urology Office Visit Elevated PSA   02/08/2023 Jose Mehta MD IP Unit Surgery    01/24/2023 Jose Mehta MD Urology Office Visit Elevated PSA   12/22/2022 Teresa Toledo MD Pulmonology Office Visit ROSA on CPAP   12/15/2022 Noel Garza MD Family Medicine Office Visit Type 2 diabetes mellitus with other specified complication, unspecified whether long term insulin use ([facility] [address]

## 2023-03-22 DIAGNOSIS — I82.401 DEEP VEIN THROMBOSIS (DVT) OF RIGHT LOWER EXTREMITY, UNSPECIFIED CHRONICITY, UNSPECIFIED VEIN (HCC): ICD-10-CM

## 2023-03-22 DIAGNOSIS — I26.02 ACUTE SADDLE PULMONARY EMBOLISM WITH ACUTE COR PULMONALE (HCC): ICD-10-CM

## 2023-03-22 NOTE — TELEPHONE ENCOUNTER
Requesting medication refill. Please approve or deny this request.    Rx requested:  Requested Prescriptions     Pending Prescriptions Disp Refills    apixaban (ELIQUIS) 5 MG TABS tablet [Pharmacy Med Name: Tawnya Neff 5 MG TABLET 5 Tablet] 60 tablet 5     Sig: TAKE 1 TABLET BY MOUTH 2 TIMES DAILY         Last Office Visit:   06/03/2022      Next Visit Date:  Future Appointments   Date Time Provider Tamera Brown   4/12/2023  1:00 PM PILAR Dillon  [address]   4/17/2023  1:00 PM Bairon Chatterjee MD [facility]   5/15/2023  1:00 PM Brianna Couch MD  [address]   [facility]15[facility]2023  [facility]   8/17/2023  1:15 PM Marlon Calix MD [facility]               Last refill 10/29/2022. Please approve or deny.

## 2023-04-15 DIAGNOSIS — I82.401 DEEP VEIN THROMBOSIS (DVT) OF RIGHT LOWER EXTREMITY, UNSPECIFIED CHRONICITY, UNSPECIFIED VEIN (HCC): ICD-10-CM

## 2023-04-15 DIAGNOSIS — I26.02 ACUTE SADDLE PULMONARY EMBOLISM WITH ACUTE COR PULMONALE (HCC): ICD-10-CM

## 2023-04-17 ENCOUNTER — OFFICE VISIT (OUTPATIENT)
Dept: FAMILY MEDICINE CLINIC | Age: 58
End: 2023-04-17
Payer: COMMERCIAL

## 2023-04-17 VITALS
WEIGHT: 290 LBS | HEIGHT: 71 IN | RESPIRATION RATE: 16 BRPM | DIASTOLIC BLOOD PRESSURE: 70 MMHG | HEART RATE: 82 BPM | SYSTOLIC BLOOD PRESSURE: 130 MMHG | OXYGEN SATURATION: 95 % | BODY MASS INDEX: 40.6 KG/M2

## 2023-04-17 DIAGNOSIS — I26.02 ACUTE SADDLE PULMONARY EMBOLISM WITH ACUTE COR PULMONALE (HCC): ICD-10-CM

## 2023-04-17 DIAGNOSIS — I82.401 DEEP VEIN THROMBOSIS (DVT) OF RIGHT LOWER EXTREMITY, UNSPECIFIED CHRONICITY, UNSPECIFIED VEIN (HCC): ICD-10-CM

## 2023-04-17 DIAGNOSIS — R97.20 ELEVATED PSA: ICD-10-CM

## 2023-04-17 DIAGNOSIS — E11.69 TYPE 2 DIABETES MELLITUS WITH OTHER SPECIFIED COMPLICATION, UNSPECIFIED WHETHER LONG TERM INSULIN USE (HCC): Primary | ICD-10-CM

## 2023-04-17 DIAGNOSIS — Z12.5 PROSTATE CANCER SCREENING: ICD-10-CM

## 2023-04-17 DIAGNOSIS — G47.33 OSA ON CPAP: ICD-10-CM

## 2023-04-17 DIAGNOSIS — Z99.89 OSA ON CPAP: ICD-10-CM

## 2023-04-17 PROCEDURE — 1036F TOBACCO NON-USER: CPT | Performed by: INTERNAL MEDICINE

## 2023-04-17 PROCEDURE — 99214 OFFICE O/P EST MOD 30 MIN: CPT | Performed by: INTERNAL MEDICINE

## 2023-04-17 PROCEDURE — 3046F HEMOGLOBIN A1C LEVEL >9.0%: CPT | Performed by: INTERNAL MEDICINE

## 2023-04-17 PROCEDURE — 2022F DILAT RTA XM EVC RTNOPTHY: CPT | Performed by: INTERNAL MEDICINE

## 2023-04-17 PROCEDURE — G8417 CALC BMI ABV UP PARAM F/U: HCPCS | Performed by: INTERNAL MEDICINE

## 2023-04-17 PROCEDURE — 3017F COLORECTAL CA SCREEN DOC REV: CPT | Performed by: INTERNAL MEDICINE

## 2023-04-17 PROCEDURE — 3078F DIAST BP <80 MM HG: CPT | Performed by: INTERNAL MEDICINE

## 2023-04-17 PROCEDURE — G8428 CUR MEDS NOT DOCUMENT: HCPCS | Performed by: INTERNAL MEDICINE

## 2023-04-17 PROCEDURE — 3075F SYST BP GE 130 - 139MM HG: CPT | Performed by: INTERNAL MEDICINE

## 2023-04-17 RX ORDER — LISINOPRIL 5 MG/1
2.5 TABLET ORAL DAILY
Qty: 30 TABLET | Refills: 2 | Status: SHIPPED | OUTPATIENT
Start: 2023-04-17 | End: 2023-07-16

## 2023-04-17 SDOH — ECONOMIC STABILITY: FOOD INSECURITY: WITHIN THE PAST 12 MONTHS, YOU WORRIED THAT YOUR FOOD WOULD RUN OUT BEFORE YOU GOT MONEY TO BUY MORE.: NEVER TRUE

## 2023-04-17 SDOH — ECONOMIC STABILITY: INCOME INSECURITY: HOW HARD IS IT FOR YOU TO PAY FOR THE VERY BASICS LIKE FOOD, HOUSING, MEDICAL CARE, AND HEATING?: NOT HARD AT ALL

## 2023-04-17 SDOH — ECONOMIC STABILITY: HOUSING INSECURITY
IN THE LAST 12 MONTHS, WAS THERE A TIME WHEN YOU DID NOT HAVE A STEADY PLACE TO SLEEP OR SLEPT IN A SHELTER (INCLUDING NOW)?: NO

## 2023-04-17 SDOH — ECONOMIC STABILITY: FOOD INSECURITY: WITHIN THE PAST 12 MONTHS, THE FOOD YOU BOUGHT JUST DIDN'T LAST AND YOU DIDN'T HAVE MONEY TO GET MORE.: NEVER TRUE

## 2023-04-17 ASSESSMENT — ENCOUNTER SYMPTOMS
TROUBLE SWALLOWING: 0
DIARRHEA: 0
RHINORRHEA: 0
RECTAL PAIN: 0
FACIAL SWELLING: 0
CONSTIPATION: 0
CHEST TIGHTNESS: 0
SHORTNESS OF BREATH: 0
VOMITING: 0
COUGH: 0
ABDOMINAL DISTENTION: 0
COLOR CHANGE: 0
SORE THROAT: 0
BACK PAIN: 0
SINUS PRESSURE: 0
EYE PAIN: 0
ABDOMINAL PAIN: 0
EYE ITCHING: 0
EYE REDNESS: 0
PHOTOPHOBIA: 0
EYE DISCHARGE: 0
SINUS PAIN: 0
APNEA: 0
BLOOD IN STOOL: 0
VOICE CHANGE: 0
NAUSEA: 0
WHEEZING: 0

## 2023-04-17 ASSESSMENT — PATIENT HEALTH QUESTIONNAIRE - PHQ9
SUM OF ALL RESPONSES TO PHQ QUESTIONS 1-9: 0
SUM OF ALL RESPONSES TO PHQ QUESTIONS 1-9: 0
2. FEELING DOWN, DEPRESSED OR HOPELESS: 0
1. LITTLE INTEREST OR PLEASURE IN DOING THINGS: 0
SUM OF ALL RESPONSES TO PHQ9 QUESTIONS 1 & 2: 0
SUM OF ALL RESPONSES TO PHQ QUESTIONS 1-9: 0
SUM OF ALL RESPONSES TO PHQ QUESTIONS 1-9: 0

## 2023-04-17 NOTE — PROGRESS NOTES
Subjective:      Patient ID: Apurva Hammond is a 59 y.o. male Established patient, here for evaluation of the following chief complaint(s):  Chief Complaint   Patient presents with    Diabetes       HPI  58-year-old diabetic with hypogonadism essential hypertension and history of pulmonary embolism presents to establish continuity with me as his primary care doctor. Hypogonadism in male: compliant with testoterone    Type 2 diabetes mellitus with other specified complication, unspecified whether long term insulin use ([facility] A1c = 6.4 2022. Januvia and metformin 1000 mg twice daily. Acute saddle pulmonary embolism with acute cor pulmonale (HCC)/Deep vein thrombosis (DVT) of right lower extremity, unspecified chronicity, unspecified vein (HCC)-eliquis    Due for Prostate cancer screening        Mother :  dementia  Father:      At present he denies polyuria,  Polydipsia, constitutional, sinus, visual, cardiopulmonary, urologic, gastrointestinal, immunologic/hematologic, musculoskeletal, neurologic,dermatologic, or psychiatric complaints. Current Outpatient Medications on File Prior to Visit   Medication Sig Dispense Refill    apixaban (ELIQUIS) 5 MG TABS tablet TAKE 1 TABLET BY MOUTH 2 TIMES DAILY 60 tablet 5    lisinopril (PRINIVIL;ZESTRIL) 5 MG tablet TAKE 1/2 TABLET BY MOUTH DAILY 30 tablet 1    JANUVIA 100 MG tablet TAKE 1 TABLET BY MOUTH DAILY 30 tablet 3    metFORMIN (GLUCOPHAGE) 1000 MG tablet TAKE 1 TABLET BY MOUTH 2 TIMES DAILY (WITH MEALS) 60 tablet 6    testosterone cypionate (DEPOTESTOTERONE CYPIONATE) 200 MG/ML injection 1MLS INJECT EVERY 2 WEEKS 10 mL 3    Lancets (ONETOUCH DELICA PLUS [id number]) MISC TEST TWICE DAILY 100 each 3    SYRINGE-NEEDLE, DISP, 3 ML (B-D INTEGRA SYRINGE) 22G X 1-1/2\" 3 ML MISC 1 each by Does not apply route daily 20 each 6    blood glucose test strips (ONETOUCH VERIO) strip 1 each by In Vitro route 2 times daily As needed.  100 each 3    famotidine

## 2023-04-22 DIAGNOSIS — E11.69 TYPE 2 DIABETES MELLITUS WITH OTHER SPECIFIED COMPLICATION, UNSPECIFIED WHETHER LONG TERM INSULIN USE (HCC): ICD-10-CM

## 2023-04-24 RX ORDER — BLOOD SUGAR DIAGNOSTIC
STRIP MISCELLANEOUS
Qty: 100 STRIP | Refills: 3 | Status: SHIPPED | OUTPATIENT
Start: 2023-04-24

## 2023-04-28 ENCOUNTER — TELEPHONE (OUTPATIENT)
Dept: UROLOGY | Age: 58
End: 2023-04-28

## 2023-04-29 DIAGNOSIS — E11.69 TYPE 2 DIABETES MELLITUS WITH OTHER SPECIFIED COMPLICATION, UNSPECIFIED WHETHER LONG TERM INSULIN USE (HCC): ICD-10-CM

## 2023-05-01 RX ORDER — LISINOPRIL 5 MG/1
TABLET ORAL
Qty: 30 TABLET | Refills: 1 | OUTPATIENT
Start: 2023-05-01

## 2023-05-01 RX ORDER — BLOOD SUGAR DIAGNOSTIC
STRIP MISCELLANEOUS
Qty: 100 STRIP | Refills: 3 | Status: SHIPPED | OUTPATIENT
Start: 2023-05-01

## 2023-05-08 RX ORDER — LISINOPRIL 5 MG/1
TABLET ORAL
Qty: 30 TABLET | Refills: 1 | Status: SHIPPED | OUTPATIENT
Start: 2023-05-08

## 2023-05-15 ENCOUNTER — OFFICE VISIT (OUTPATIENT)
Dept: PULMONOLOGY | Age: 58
End: 2023-05-15
Payer: COMMERCIAL

## 2023-05-15 VITALS
HEART RATE: 76 BPM | OXYGEN SATURATION: 95 % | TEMPERATURE: 97.7 F | BODY MASS INDEX: 40.45 KG/M2 | SYSTOLIC BLOOD PRESSURE: 146 MMHG | WEIGHT: 290 LBS | DIASTOLIC BLOOD PRESSURE: 88 MMHG

## 2023-05-15 DIAGNOSIS — E29.1 HYPOGONADISM IN MALE: ICD-10-CM

## 2023-05-15 DIAGNOSIS — I26.02 ACUTE SADDLE PULMONARY EMBOLISM WITH ACUTE COR PULMONALE (HCC): ICD-10-CM

## 2023-05-15 DIAGNOSIS — E11.69 TYPE 2 DIABETES MELLITUS WITH OTHER SPECIFIED COMPLICATION, UNSPECIFIED WHETHER LONG TERM INSULIN USE (HCC): ICD-10-CM

## 2023-05-15 DIAGNOSIS — E66.9 OBESITY (BMI 30-39.9): ICD-10-CM

## 2023-05-15 DIAGNOSIS — G47.33 OSA ON CPAP: Primary | ICD-10-CM

## 2023-05-15 DIAGNOSIS — I82.401 DEEP VEIN THROMBOSIS (DVT) OF RIGHT LOWER EXTREMITY, UNSPECIFIED CHRONICITY, UNSPECIFIED VEIN (HCC): ICD-10-CM

## 2023-05-15 DIAGNOSIS — Z99.89 OSA ON CPAP: Primary | ICD-10-CM

## 2023-05-15 LAB
ANION GAP SERPL CALCULATED.3IONS-SCNC: 13 MEQ/L (ref 9–15)
BUN SERPL-MCNC: 18 MG/DL (ref 6–20)
CALCIUM SERPL-MCNC: 9.1 MG/DL (ref 8.5–9.9)
CHLORIDE SERPL-SCNC: 101 MEQ/L (ref 95–107)
CO2 SERPL-SCNC: 25 MEQ/L (ref 20–31)
CREAT SERPL-MCNC: 1 MG/DL (ref 0.7–1.2)
GLUCOSE FASTING: 89 MG/DL (ref 70–99)
HBA1C MFR BLD: 6.6 % (ref 4.8–5.9)
POTASSIUM SERPL-SCNC: 4.5 MEQ/L (ref 3.4–4.9)
SODIUM SERPL-SCNC: 139 MEQ/L (ref 135–144)

## 2023-05-15 PROCEDURE — 99214 OFFICE O/P EST MOD 30 MIN: CPT | Performed by: INTERNAL MEDICINE

## 2023-05-15 PROCEDURE — 3077F SYST BP >= 140 MM HG: CPT | Performed by: INTERNAL MEDICINE

## 2023-05-15 PROCEDURE — 3017F COLORECTAL CA SCREEN DOC REV: CPT | Performed by: INTERNAL MEDICINE

## 2023-05-15 PROCEDURE — G8427 DOCREV CUR MEDS BY ELIG CLIN: HCPCS | Performed by: INTERNAL MEDICINE

## 2023-05-15 PROCEDURE — 3079F DIAST BP 80-89 MM HG: CPT | Performed by: INTERNAL MEDICINE

## 2023-05-15 PROCEDURE — G8417 CALC BMI ABV UP PARAM F/U: HCPCS | Performed by: INTERNAL MEDICINE

## 2023-05-15 PROCEDURE — 1036F TOBACCO NON-USER: CPT | Performed by: INTERNAL MEDICINE

## 2023-05-15 RX ORDER — ASPIRIN 81 MG/1
81 TABLET ORAL DAILY
COMMUNITY

## 2023-05-15 ASSESSMENT — ENCOUNTER SYMPTOMS
SORE THROAT: 0
NAUSEA: 0
COUGH: 0
SHORTNESS OF BREATH: 0
DIARRHEA: 0
RHINORRHEA: 0
WHEEZING: 0
EYE ITCHING: 0
VOICE CHANGE: 0
CHEST TIGHTNESS: 0
VOMITING: 0
ABDOMINAL PAIN: 0

## 2023-05-15 NOTE — PROGRESS NOTES
Subjective:     Arti Delcid is a 59 y.o. male who complains today of:     Chief Complaint   Patient presents with    Follow-up     4m f/u on ROSA       HPI  He is using CPAP with  11 centimeters of H2O with heated humidity. He is using CPAP for about 9 hours every night. He is using CPAP with Nasal  Mask. He said  sleep is restful with the CPAP use. He is compliant with CPAP therapy and benefiting with CPAP use. No snoring with CPAP use. No complaint of daytime sleepiness or tiredness with CPAP use. He denies taking naps. No sleepiness with driving. He denies difficulty falling asleep or staying asleep. I reviewed compliance report with patient regarding CPAP therapy. He is using  CPAP for 30 days out of 30 days  Average usage of days used is 9 hours and 8 min , average AHI 2.8 with CPAP use. He had a PE thrombectomy done and significant amount of blood clot was pulled out. He also had DVT rt. Leg . he is off Eliquis he is on aspirin .     Allergies:  Quinolones  Past Medical History:   Diagnosis Date    Abnormal EKG 12/03/2021    Chronic back pain     Diabetes (HCC)     GERD (gastroesophageal reflux disease)     High cholesterol     History of DVT (deep vein thrombosis) 06/03/2021    History of pulmonary embolism 06/03/2021    Hx of blood clots     Hypertension     IBS (irritable bowel syndrome)     Kidney, malrotation     Sleep apnea      Past Surgical History:   Procedure Laterality Date    COLONOSCOPY      ENDOSCOPY, COLON, DIAGNOSTIC      PROSTATE SURGERY N/A 2/8/2023    TRANSRECTAL ULTRASOUND GUIDED PROSTATE BIOPSY performed by Sam Manzo MD at [address]  2021     Family History   Problem Relation Age of Onset    Asthma Mother     Heart Disease Father     Other Sister         Sarcoidosis    Cirrhosis Sister      Social History     Socioeconomic History    Marital status: Single     Spouse name: Not on file    Number of children: Not on file    Years of

## 2023-05-16 LAB
SHBG SERPL-SCNC: 25 NMOL/L (ref 11–80)
TESTOST FREE SERPL-MCNC: 61.3 PG/ML (ref 47–244)
TESTOST SERPL-MCNC: 270 NG/DL (ref 220–1000)

## 2023-05-23 ENCOUNTER — OFFICE VISIT (OUTPATIENT)
Dept: ENDOCRINOLOGY | Age: 58
End: 2023-05-23

## 2023-05-23 VITALS
OXYGEN SATURATION: 94 % | WEIGHT: 293 LBS | HEART RATE: 81 BPM | SYSTOLIC BLOOD PRESSURE: 137 MMHG | DIASTOLIC BLOOD PRESSURE: 74 MMHG | HEIGHT: 71 IN | BODY MASS INDEX: 41.02 KG/M2

## 2023-05-23 DIAGNOSIS — E29.1 HYPOGONADISM IN MALE: ICD-10-CM

## 2023-05-23 DIAGNOSIS — E11.69 TYPE 2 DIABETES MELLITUS WITH OTHER SPECIFIED COMPLICATION, UNSPECIFIED WHETHER LONG TERM INSULIN USE (HCC): Primary | ICD-10-CM

## 2023-05-23 LAB
CHP ED QC CHECK: NORMAL
GLUCOSE BLD-MCNC: 94 MG/DL

## 2023-05-23 RX ORDER — TESTOSTERONE CYPIONATE 200 MG/ML
INJECTION, SOLUTION INTRAMUSCULAR
Qty: 10 ML | Refills: 3 | Status: SHIPPED | OUTPATIENT
Start: 2023-05-23 | End: 2023-06-29

## 2023-05-23 RX ORDER — TESTOSTERONE CYPIONATE 200 MG/ML
INJECTION, SOLUTION INTRAMUSCULAR
Qty: 10 ML | Refills: 3 | Status: SHIPPED | OUTPATIENT
Start: 2023-05-23 | End: 2023-05-23 | Stop reason: SDUPTHER

## 2023-05-23 RX ORDER — SEMAGLUTIDE 2.68 MG/ML
INJECTION, SOLUTION SUBCUTANEOUS
Qty: 3 ML | Refills: 3 | Status: SHIPPED | OUTPATIENT
Start: 2023-05-23

## 2023-05-23 NOTE — TELEPHONE ENCOUNTER
Pharmacy requesting medication refill.  Please approve or deny this request.    Rx requested:  Requested Prescriptions     Pending Prescriptions Disp Refills    Semaglutide, 2 MG/DOSE, (OZEMPIC, 2 MG/DOSE,) 8 MG/3ML SOPN 3 mL 3     Sig: Inject once a week         Last Office Visit:   5/23/2023      Next Visit Date:  Future Appointments   Date Time Provider Tamera Brown   6/15/2023  1:00 PM Albert Kumar  [facility]   7/18/2023  1:00 PM Karrie Mays MD [address] [place] [address]   8/17/2023  1:15 PM Kumar Bonner MD [facility]   8/23/2023  1:30 PM Rory Bingham  [address]   9/18/2023  1:00 PM Daquan Osorio MD [facility]

## 2023-05-23 NOTE — PROGRESS NOTES
6    Lancets (ONETOUCH DELICA PLUS [id number]) MISC, TEST TWICE DAILY, Disp: 100 each, Rfl: 3    SYRINGE-NEEDLE, DISP, 3 ML (B-D INTEGRA SYRINGE) 22G X 1-1/2\" 3 ML MISC, 1 each by Does not apply route daily, Disp: 20 each, Rfl: 6    famotidine (PEPCID) 20 MG tablet, Take 1 tablet by mouth 2 times daily, Disp: , Rfl:     Blood Glucose Monitoring Suppl (ONETOUCH VERIO) w/Device KIT, As directed, Disp: 1 kit, Rfl: 1    Respiratory Therapy Supplies ROSALIND, New Full face CPAP mask and supplies. , Disp: 1 Device, Rfl: 0    CPAP Machine MISC, by Does not apply route New CPAP with 11 cm, Disp: 1 each, Rfl: 0    Semaglutide,0.25 or 0.5MG/DOS, 2 MG/1.5ML SOPN, Inject  . 25 mg into skin weekly (Patient not taking: Reported on 5/23/2023), Disp: 4 Adjustable Dose Pre-filled Pen Syringe, Rfl: 2    testosterone cypionate (DEPOTESTOTERONE CYPIONATE) 200 MG/ML injection, 1MLS INJECT EVERY 2 WEEKS, Disp: 10 mL, Rfl: 3  Lab Results   Component Value Date     05/15/2023    K 4.5 05/15/2023     05/15/2023    CO2 25 05/15/2023    BUN 18 05/15/2023    CREATININE 1.00 05/15/2023    GLUCOSE 94 05/23/2023    CALCIUM 9.1 05/15/2023    PROT 7.8 04/21/2021    LABALBU 4.3 04/21/2021    BILITOT 0.7 04/21/2021    ALKPHOS 77 04/21/2021    AST 20 04/21/2021    ALT 35 04/21/2021    LABGLOM >60.0 05/15/2023    GFRAA >60.0 06/03/2022    GLOB 3.5 04/21/2021     Lab Results   Component Value Date    WBC 8.4 02/07/2023    HGB 15.8 02/07/2023    HCT 49.1 02/07/2023    MCV 88.0 02/07/2023     02/07/2023     Lab Results   Component Value Date    LABA1C 6.6 (H) 05/15/2023    LABA1C 6.4 (H) 12/01/2022    LABA1C 5.9 09/15/2022     Lab Results   Component Value Date    CHOLFAST 196 06/03/2022    TRIGLYCFAST 193 (H) 06/03/2022    HDL 31 (L) 06/03/2022    HDL 28 (L) 04/22/2021    HDL 35 (L) 05/21/2013    LDLCALC 126 06/03/2022    LDLCALC 106 04/22/2021    LDLCALC 126 05/21/2013    CHOL 169 04/22/2021    CHOL 264 (H) 05/21/2013    TRIG 173 (H)

## 2023-05-28 ASSESSMENT — ENCOUNTER SYMPTOMS: EYES NEGATIVE: 1

## 2023-05-30 DIAGNOSIS — E11.69 TYPE 2 DIABETES MELLITUS WITH OTHER SPECIFIED COMPLICATION, UNSPECIFIED WHETHER LONG TERM INSULIN USE (HCC): ICD-10-CM

## 2023-06-17 DIAGNOSIS — E11.69 TYPE 2 DIABETES MELLITUS WITH OTHER SPECIFIED COMPLICATION, UNSPECIFIED WHETHER LONG TERM INSULIN USE (HCC): ICD-10-CM

## 2023-06-19 RX ORDER — LANCETS 33 GAUGE
EACH MISCELLANEOUS
Qty: 100 EACH | Refills: 3 | Status: SHIPPED | OUTPATIENT
Start: 2023-06-19

## 2023-06-24 DIAGNOSIS — E11.69 TYPE 2 DIABETES MELLITUS WITH OTHER SPECIFIED COMPLICATION, UNSPECIFIED WHETHER LONG TERM INSULIN USE (HCC): ICD-10-CM

## 2023-07-13 ENCOUNTER — PATIENT MESSAGE (OUTPATIENT)
Dept: UROLOGY | Age: 58
End: 2023-07-13

## 2023-07-29 DIAGNOSIS — E11.69 TYPE 2 DIABETES MELLITUS WITH OTHER SPECIFIED COMPLICATION, UNSPECIFIED WHETHER LONG TERM INSULIN USE (HCC): ICD-10-CM

## 2023-07-31 RX ORDER — SITAGLIPTIN 100 MG/1
TABLET, FILM COATED ORAL
Qty: 30 TABLET | Refills: 3 | Status: SHIPPED | OUTPATIENT
Start: 2023-07-31

## 2023-08-16 DIAGNOSIS — E11.69 TYPE 2 DIABETES MELLITUS WITH OTHER SPECIFIED COMPLICATION, UNSPECIFIED WHETHER LONG TERM INSULIN USE (HCC): ICD-10-CM

## 2023-08-16 DIAGNOSIS — R97.20 ELEVATED PSA: ICD-10-CM

## 2023-08-16 DIAGNOSIS — E29.1 HYPOGONADISM IN MALE: ICD-10-CM

## 2023-08-16 LAB
ANION GAP SERPL CALCULATED.3IONS-SCNC: 15 MEQ/L (ref 9–15)
BUN SERPL-MCNC: 20 MG/DL (ref 6–20)
CALCIUM SERPL-MCNC: 9.3 MG/DL (ref 8.5–9.9)
CHLORIDE SERPL-SCNC: 102 MEQ/L (ref 95–107)
CO2 SERPL-SCNC: 20 MEQ/L (ref 20–31)
CREAT SERPL-MCNC: 1.05 MG/DL (ref 0.7–1.2)
GLUCOSE SERPL-MCNC: 104 MG/DL (ref 70–99)
HBA1C MFR BLD: 6.2 % (ref 4.8–5.9)
POTASSIUM SERPL-SCNC: 5.4 MEQ/L (ref 3.4–4.9)
PSA SERPL-MCNC: 8.22 NG/ML (ref 0–4)
SODIUM SERPL-SCNC: 137 MEQ/L (ref 135–144)

## 2023-08-17 LAB
SHBG SERPL-SCNC: 26 NMOL/L (ref 11–80)
TESTOST FREE SERPL-MCNC: 237.8 PG/ML (ref 47–244)
TESTOST SERPL-MCNC: 896 NG/DL (ref 220–1000)

## 2023-08-23 ENCOUNTER — OFFICE VISIT (OUTPATIENT)
Dept: ENDOCRINOLOGY | Age: 58
End: 2023-08-23
Payer: COMMERCIAL

## 2023-08-23 VITALS
DIASTOLIC BLOOD PRESSURE: 82 MMHG | WEIGHT: 291 LBS | OXYGEN SATURATION: 94 % | HEART RATE: 81 BPM | SYSTOLIC BLOOD PRESSURE: 136 MMHG | HEIGHT: 71 IN | BODY MASS INDEX: 40.74 KG/M2

## 2023-08-23 DIAGNOSIS — E66.01 MORBID OBESITY (HCC): ICD-10-CM

## 2023-08-23 DIAGNOSIS — E29.1 HYPOGONADISM IN MALE: ICD-10-CM

## 2023-08-23 DIAGNOSIS — E11.69 TYPE 2 DIABETES MELLITUS WITH OTHER SPECIFIED COMPLICATION, UNSPECIFIED WHETHER LONG TERM INSULIN USE (HCC): Primary | ICD-10-CM

## 2023-08-23 LAB
CHP ED QC CHECK: NORMAL
GLUCOSE BLD-MCNC: 92 MG/DL

## 2023-08-23 PROCEDURE — 3017F COLORECTAL CA SCREEN DOC REV: CPT | Performed by: INTERNAL MEDICINE

## 2023-08-23 PROCEDURE — G8427 DOCREV CUR MEDS BY ELIG CLIN: HCPCS | Performed by: INTERNAL MEDICINE

## 2023-08-23 PROCEDURE — 3074F SYST BP LT 130 MM HG: CPT | Performed by: INTERNAL MEDICINE

## 2023-08-23 PROCEDURE — 99213 OFFICE O/P EST LOW 20 MIN: CPT | Performed by: INTERNAL MEDICINE

## 2023-08-23 PROCEDURE — 3044F HG A1C LEVEL LT 7.0%: CPT | Performed by: INTERNAL MEDICINE

## 2023-08-23 PROCEDURE — 3078F DIAST BP <80 MM HG: CPT | Performed by: INTERNAL MEDICINE

## 2023-08-23 PROCEDURE — 1036F TOBACCO NON-USER: CPT | Performed by: INTERNAL MEDICINE

## 2023-08-23 PROCEDURE — 2022F DILAT RTA XM EVC RTNOPTHY: CPT | Performed by: INTERNAL MEDICINE

## 2023-08-23 PROCEDURE — 82962 GLUCOSE BLOOD TEST: CPT | Performed by: INTERNAL MEDICINE

## 2023-08-23 PROCEDURE — G8417 CALC BMI ABV UP PARAM F/U: HCPCS | Performed by: INTERNAL MEDICINE

## 2023-08-23 NOTE — PROGRESS NOTES
8/23/2023    Assessment:       Diagnosis Orders   1. Type 2 diabetes mellitus with other specified complication, unspecified whether long term insulin use ([facility])  POCT Glucose      2. Hypogonadism in male        3. Morbid obesity ([address])              PLAN:     Orders Placed This Encounter   Procedures    Lipid Panel     Standing Status:   Future     Standing Expiration Date:   8/23/2024    Hemoglobin A1C     Standing Status:   Future     Standing Expiration Date:   8/23/2024    Microalbumin / Creatinine Urine Ratio     Standing Status:   Future     Standing Expiration Date:   8/23/2024    Testosterone, free, total     Standing Status:   Future     Standing Expiration Date:   8/23/2024    POCT Glucose     DIABETES FOOT EXAM     Discontinue Ozempic  Continue Januvia metformin continue current dose of testosterone 1.3 cc every 10 days    Orders Placed This Encounter   Procedures    POCT Glucose     No orders of the defined types were placed in this encounter. No follow-ups on file. Subjective:     Chief Complaint   Patient presents with    Diabetes    Hypogonadism     Vitals:    08/23/23 1337   BP: 136/82   Pulse: 81   SpO2: 94%   Weight: 291 lb (132 kg)   Height: 5' 11\" (1.803 m)     Wt Readings from Last 3 Encounters:   08/23/23 291 lb (132 kg)   06/15/23 286 lb 3.2 oz (129.8 kg)   05/23/23 293 lb (132.9 kg)     BP Readings from Last 3 Encounters:   08/23/23 136/82   06/15/23 (!) 132/90   05/23/23 137/74     Follow-up with type 2 . Diabetes patient on metformin 1000 g twice daily Januvia 100 g daily was to discontinue Ozempic A1c was 6.2 also testosterone replacement testosterone level is stable hemoglobin A1c has come down to 6.2  Obesity BMI 40    Hypogonadism on testosterone replacement last testosterone level was 896    Diabetes  He presents for his follow-up diabetic visit. He has type 2 diabetes mellitus. Pertinent negatives for diabetes include no polydipsia and no polyuria. Symptoms are improving.  Risk

## 2023-09-07 DIAGNOSIS — E29.1 HYPOGONADISM IN MALE: ICD-10-CM

## 2023-09-07 RX ORDER — TESTOSTERONE CYPIONATE 200 MG/ML
INJECTION, SOLUTION INTRAMUSCULAR
Qty: 10 ML | Refills: 3 | Status: SHIPPED | OUTPATIENT
Start: 2023-09-07 | End: 2023-10-14

## 2023-09-07 NOTE — TELEPHONE ENCOUNTER
Patient requesting medication refill.  Please approve or deny this request.    Rx requested:  Requested Prescriptions     Pending Prescriptions Disp Refills    testosterone cypionate (DEPOTESTOTERONE CYPIONATE) 200 MG/ML injection 10 mL 3     Si.3 cc very 10 days         Last Office Visit:   2023      Next Visit Date:  Future Appointments   Date Time Provider [address]   2023  1:00 PM Sheyla Espana, [address]   11/15/2023  1:30 PM Amina Friedman MD [address] [place] [address]   2024  1:00 -198 [facility]

## 2023-09-18 ENCOUNTER — OFFICE VISIT (OUTPATIENT)
Dept: PULMONOLOGY | Age: 58
End: 2023-09-18
Payer: COMMERCIAL

## 2023-09-18 VITALS
BODY MASS INDEX: 40.45 KG/M2 | DIASTOLIC BLOOD PRESSURE: 94 MMHG | OXYGEN SATURATION: 95 % | WEIGHT: 290 LBS | SYSTOLIC BLOOD PRESSURE: 152 MMHG | HEART RATE: 77 BPM | TEMPERATURE: 97.3 F

## 2023-09-18 DIAGNOSIS — I26.02 ACUTE SADDLE PULMONARY EMBOLISM WITH ACUTE COR PULMONALE (HCC): ICD-10-CM

## 2023-09-18 DIAGNOSIS — Z99.89 OSA ON CPAP: Primary | ICD-10-CM

## 2023-09-18 DIAGNOSIS — G47.33 OSA ON CPAP: Primary | ICD-10-CM

## 2023-09-18 DIAGNOSIS — E66.9 OBESITY (BMI 30-39.9): ICD-10-CM

## 2023-09-18 PROCEDURE — G8427 DOCREV CUR MEDS BY ELIG CLIN: HCPCS | Performed by: INTERNAL MEDICINE

## 2023-09-18 PROCEDURE — 3080F DIAST BP >= 90 MM HG: CPT | Performed by: INTERNAL MEDICINE

## 2023-09-18 PROCEDURE — 99214 OFFICE O/P EST MOD 30 MIN: CPT | Performed by: INTERNAL MEDICINE

## 2023-09-18 PROCEDURE — G8417 CALC BMI ABV UP PARAM F/U: HCPCS | Performed by: INTERNAL MEDICINE

## 2023-09-18 PROCEDURE — 3017F COLORECTAL CA SCREEN DOC REV: CPT | Performed by: INTERNAL MEDICINE

## 2023-09-18 PROCEDURE — 3077F SYST BP >= 140 MM HG: CPT | Performed by: INTERNAL MEDICINE

## 2023-09-18 PROCEDURE — 1036F TOBACCO NON-USER: CPT | Performed by: INTERNAL MEDICINE

## 2023-09-18 ASSESSMENT — ENCOUNTER SYMPTOMS
RHINORRHEA: 0
SHORTNESS OF BREATH: 0
ABDOMINAL PAIN: 0
WHEEZING: 0
VOICE CHANGE: 0
DIARRHEA: 0
CHEST TIGHTNESS: 0
NAUSEA: 0
EYE ITCHING: 0
VOMITING: 0
SORE THROAT: 0
COUGH: 0

## 2023-11-10 DIAGNOSIS — E11.69 TYPE 2 DIABETES MELLITUS WITH OTHER SPECIFIED COMPLICATION, UNSPECIFIED WHETHER LONG TERM INSULIN USE (HCC): ICD-10-CM

## 2023-11-10 DIAGNOSIS — E29.1 HYPOGONADISM IN MALE: ICD-10-CM

## 2023-11-10 LAB
CHOLEST SERPL-MCNC: 206 MG/DL (ref 0–199)
CREAT UR-MCNC: 125.2 MG/DL
HBA1C MFR BLD: 6.1 % (ref 4.8–5.9)
HDLC SERPL-MCNC: 33 MG/DL (ref 40–59)
LDLC SERPL CALC-MCNC: 140 MG/DL (ref 0–129)
MICROALBUMIN UR-MCNC: <1.2 MG/DL
MICROALBUMIN/CREAT UR-RTO: NORMAL MG/G (ref 0–30)
SHBG SERPL-SCNC: 25 NMOL/L (ref 11–80)
TESTOST FREE SERPL-MCNC: 172.2 PG/ML (ref 47–244)
TESTOST SERPL-MCNC: 673 NG/DL (ref 220–1000)
TRIGL SERPL-MCNC: 163 MG/DL (ref 0–150)

## 2023-11-15 ENCOUNTER — OFFICE VISIT (OUTPATIENT)
Dept: ENDOCRINOLOGY | Age: 58
End: 2023-11-15

## 2023-11-15 VITALS
BODY MASS INDEX: 40.18 KG/M2 | HEART RATE: 71 BPM | OXYGEN SATURATION: 94 % | WEIGHT: 287 LBS | SYSTOLIC BLOOD PRESSURE: 133 MMHG | DIASTOLIC BLOOD PRESSURE: 76 MMHG | HEIGHT: 71 IN

## 2023-11-15 DIAGNOSIS — E11.69 TYPE 2 DIABETES MELLITUS WITH OTHER SPECIFIED COMPLICATION, UNSPECIFIED WHETHER LONG TERM INSULIN USE (HCC): Primary | ICD-10-CM

## 2023-11-15 DIAGNOSIS — E29.1 HYPOGONADISM IN MALE: ICD-10-CM

## 2023-11-15 LAB
CHP ED QC CHECK: NORMAL
GLUCOSE BLD-MCNC: 90 MG/DL

## 2023-11-15 RX ORDER — NEEDLES, FILTER 19GX1 1/2"
1 NEEDLE, DISPOSABLE MISCELLANEOUS DAILY
Qty: 20 EACH | Refills: 6 | Status: SHIPPED | OUTPATIENT
Start: 2023-11-15

## 2023-11-15 RX ORDER — BLOOD-GLUCOSE METER
EACH MISCELLANEOUS
Qty: 100 STRIP | Refills: 3 | Status: SHIPPED | OUTPATIENT
Start: 2023-11-15

## 2023-11-15 RX ORDER — TESTOSTERONE CYPIONATE 200 MG/ML
INJECTION, SOLUTION INTRAMUSCULAR
Qty: 10 ML | Refills: 3 | Status: SHIPPED | OUTPATIENT
Start: 2023-11-15 | End: 2023-12-22

## 2023-11-15 RX ORDER — LANCETS 33 GAUGE
EACH MISCELLANEOUS
Qty: 100 EACH | Refills: 3 | Status: SHIPPED | OUTPATIENT
Start: 2023-11-15

## 2023-11-15 NOTE — PROGRESS NOTES
on file     Unstable Housing in the Last Year: No     Family History   Problem Relation Age of Onset    Asthma Mother     Heart Disease Father     Other Sister         Sarcoidosis    Cirrhosis Sister      Allergies   Allergen Reactions    Quinolones        Current Outpatient Medications:     JANUVIA 100 MG tablet, TAKE 1 TABLET BY MOUTH DAILY, Disp: 30 tablet, Rfl: 3    metFORMIN (GLUCOPHAGE) 1000 MG tablet, TAKE 1 TABLET BY MOUTH 2 TIMES DAILY (WITH MEALS), Disp: 60 tablet, Rfl: 6    Lancets (ONETOUCH DELICA PLUS [id number]) MISC, TEST TWICE DAILY, Disp: 100 each, Rfl: 3    ELIQUIS 5 MG TABS tablet, , Disp: , Rfl:     lisinopril (PRINIVIL;ZESTRIL) 5 MG tablet, TAKE 1/2 TABLET BY MOUTH DAILY, Disp: 30 tablet, Rfl: 1    ONETOUCH VERIO strip, TEST 2 TIMES DAILY AS NEEDED., Disp: 100 strip, Rfl: 3    SYRINGE-NEEDLE, DISP, 3 ML (B-D INTEGRA SYRINGE) 22G X 1-1/2\" 3 ML MISC, 1 each by Does not apply route daily, Disp: 20 each, Rfl: 6    famotidine (PEPCID) 20 MG tablet, Take 1 tablet by mouth 2 times daily, Disp: , Rfl:     Blood Glucose Monitoring Suppl (ONETOUCH VERIO) w/Device KIT, As directed, Disp: 1 kit, Rfl: 1    Respiratory Therapy Supplies ROSALIND, New Full face CPAP mask and supplies. , Disp: 1 Device, Rfl: 0    CPAP Machine MISC, by Does not apply route New CPAP with 11 cm, Disp: 1 each, Rfl: 0    testosterone cypionate (DEPOTESTOTERONE CYPIONATE) 200 MG/ML injection, 1.3 cc very 10 days, Disp: 10 mL, Rfl: 3  Lab Results   Component Value Date     08/16/2023    K 5.4 (H) 08/16/2023     08/16/2023    CO2 20 08/16/2023    BUN 20 08/16/2023    CREATININE 1.05 08/16/2023    GLUCOSE 92 08/23/2023    CALCIUM 9.3 08/16/2023    PROT 7.8 04/21/2021    LABALBU 4.3 04/21/2021    BILITOT 0.7 04/21/2021    ALKPHOS 77 04/21/2021    AST 20 04/21/2021    ALT 35 04/21/2021    LABGLOM >60.0 08/16/2023    GFRAA >60.0 06/03/2022    GLOB 3.5 04/21/2021     Lab Results   Component Value Date    WBC 8.4 02/07/2023    HGB 15.8

## 2023-11-18 DIAGNOSIS — E11.69 TYPE 2 DIABETES MELLITUS WITH OTHER SPECIFIED COMPLICATION, UNSPECIFIED WHETHER LONG TERM INSULIN USE (HCC): ICD-10-CM

## 2023-11-20 RX ORDER — SITAGLIPTIN 100 MG/1
100 TABLET, FILM COATED ORAL DAILY
Qty: 30 TABLET | Refills: 3 | OUTPATIENT
Start: 2023-11-20

## 2023-12-02 DIAGNOSIS — E11.69 TYPE 2 DIABETES MELLITUS WITH OTHER SPECIFIED COMPLICATION, UNSPECIFIED WHETHER LONG TERM INSULIN USE (HCC): ICD-10-CM

## 2023-12-04 RX ORDER — BLOOD SUGAR DIAGNOSTIC
STRIP MISCELLANEOUS
Qty: 100 STRIP | Refills: 3 | Status: SHIPPED | OUTPATIENT
Start: 2023-12-04

## 2023-12-09 DIAGNOSIS — E11.69 TYPE 2 DIABETES MELLITUS WITH OTHER SPECIFIED COMPLICATION, UNSPECIFIED WHETHER LONG TERM INSULIN USE (HCC): ICD-10-CM

## 2023-12-11 RX ORDER — BLOOD SUGAR DIAGNOSTIC
STRIP MISCELLANEOUS
Qty: 100 STRIP | Refills: 3 | Status: SHIPPED | OUTPATIENT
Start: 2023-12-11

## 2024-01-22 RX ORDER — LISINOPRIL 5 MG/1
TABLET ORAL
Qty: 30 TABLET | Refills: 1 | Status: SHIPPED | OUTPATIENT
Start: 2024-01-22

## 2024-01-22 NOTE — TELEPHONE ENCOUNTER
Rx requested:  Requested Prescriptions     Pending Prescriptions Disp Refills    lisinopril (PRINIVIL;ZESTRIL) 5 MG tablet [Pharmacy Med Name: LISINOPRIL 5 MG TABLET 5 Tablet] 30 tablet 1     Sig: TAKE 1/2 TABLET BY MOUTH DAILY         Last Office Visit:   4/17/2023      Next Visit Date:  Future Appointments   Date Time Provider Department Center   3/13/2024  1:30 PM Rush Yuan MD [facility]   3/20/2024  1:00 PM Bulmaro Gracia MD [facility]   6/13/2024  1:00 PM Albert Kohler DO [facility]

## 2024-01-23 RX ORDER — APIXABAN 5 MG/1
5 TABLET, FILM COATED ORAL 2 TIMES DAILY
Qty: 180 TABLET | Refills: 3 | Status: SHIPPED | OUTPATIENT
Start: 2024-01-23

## 2024-01-23 NOTE — TELEPHONE ENCOUNTER
Requesting medication refill. Please approve or deny this request.    Rx requested:  Requested Prescriptions     Pending Prescriptions Disp Refills    ELIQUIS 5 MG TABS tablet [Pharmacy Med Name: ELIQUIS 5 MG TABLET 5 Tablet] 60 tablet 8     Sig: TAKE 1 TABLET BY MOUTH 2 TIMES DAILY         Last Office Visit:   6/15/2023      Next Visit Date:  Future Appointments   Date Time Provider Department Center   3/13/2024  1:30 PM Rush Yuan MD [facility]   3/20/2024  1:00 PM Bulmaro Gracia MD [facility]   6/13/2024  1:00 PM Albert Kohler DO [facility]               Last refill 05/17/2023. Please approve or deny.

## 2024-03-06 DIAGNOSIS — E11.69 TYPE 2 DIABETES MELLITUS WITH OTHER SPECIFIED COMPLICATION, UNSPECIFIED WHETHER LONG TERM INSULIN USE (HCC): ICD-10-CM

## 2024-03-06 DIAGNOSIS — E29.1 HYPOGONADISM IN MALE: ICD-10-CM

## 2024-03-06 LAB
ANION GAP SERPL CALCULATED.3IONS-SCNC: 13 MEQ/L (ref 9–15)
BUN SERPL-MCNC: 16 MG/DL (ref 8–23)
CALCIUM SERPL-MCNC: 8.3 MG/DL (ref 8.5–9.9)
CHLORIDE SERPL-SCNC: 103 MEQ/L (ref 95–107)
CO2 SERPL-SCNC: 24 MEQ/L (ref 20–31)
CREAT SERPL-MCNC: 1.18 MG/DL (ref 0.7–1.2)
GLUCOSE SERPL-MCNC: 129 MG/DL (ref 70–99)
HBA1C MFR BLD: 7.5 % (ref 4.8–5.9)
POTASSIUM SERPL-SCNC: 4.1 MEQ/L (ref 3.4–4.9)
PSA SERPL-MCNC: 7.64 NG/ML (ref 0–4)
SHBG SERPL-SCNC: 27 NMOL/L (ref 19–76)
SODIUM SERPL-SCNC: 140 MEQ/L (ref 135–144)
TESTOST FREE SERPL-MCNC: 100.1 PG/ML (ref 47–244)
TESTOST SERPL-MCNC: 435 NG/DL (ref 193–740)

## 2024-03-06 PROCEDURE — 36415 COLL VENOUS BLD VENIPUNCTURE: CPT | Performed by: INTERNAL MEDICINE

## 2024-03-09 DIAGNOSIS — E11.69 TYPE 2 DIABETES MELLITUS WITH OTHER SPECIFIED COMPLICATION, UNSPECIFIED WHETHER LONG TERM INSULIN USE (HCC): ICD-10-CM

## 2024-03-11 RX ORDER — SITAGLIPTIN 100 MG/1
100 TABLET, FILM COATED ORAL DAILY
Qty: 30 TABLET | Refills: 3 | Status: SHIPPED | OUTPATIENT
Start: 2024-03-11 | End: 2024-03-13 | Stop reason: SDUPTHER

## 2024-03-13 ENCOUNTER — OFFICE VISIT (OUTPATIENT)
Dept: ENDOCRINOLOGY | Age: 59
End: 2024-03-13
Payer: COMMERCIAL

## 2024-03-13 VITALS
BODY MASS INDEX: 40.46 KG/M2 | DIASTOLIC BLOOD PRESSURE: 81 MMHG | SYSTOLIC BLOOD PRESSURE: 144 MMHG | HEIGHT: 71 IN | WEIGHT: 289 LBS | OXYGEN SATURATION: 94 % | HEART RATE: 77 BPM

## 2024-03-13 DIAGNOSIS — E11.69 TYPE 2 DIABETES MELLITUS WITH OTHER SPECIFIED COMPLICATION, UNSPECIFIED WHETHER LONG TERM INSULIN USE (HCC): Primary | ICD-10-CM

## 2024-03-13 DIAGNOSIS — E29.1 HYPOGONADISM IN MALE: ICD-10-CM

## 2024-03-13 LAB
CHP ED QC CHECK: NORMAL
GLUCOSE BLD-MCNC: 93 MG/DL

## 2024-03-13 PROCEDURE — 3079F DIAST BP 80-89 MM HG: CPT | Performed by: INTERNAL MEDICINE

## 2024-03-13 PROCEDURE — 3017F COLORECTAL CA SCREEN DOC REV: CPT | Performed by: INTERNAL MEDICINE

## 2024-03-13 PROCEDURE — 99213 OFFICE O/P EST LOW 20 MIN: CPT | Performed by: INTERNAL MEDICINE

## 2024-03-13 PROCEDURE — 3077F SYST BP >= 140 MM HG: CPT | Performed by: INTERNAL MEDICINE

## 2024-03-13 PROCEDURE — G8417 CALC BMI ABV UP PARAM F/U: HCPCS | Performed by: INTERNAL MEDICINE

## 2024-03-13 PROCEDURE — 2022F DILAT RTA XM EVC RTNOPTHY: CPT | Performed by: INTERNAL MEDICINE

## 2024-03-13 PROCEDURE — G8484 FLU IMMUNIZE NO ADMIN: HCPCS | Performed by: INTERNAL MEDICINE

## 2024-03-13 PROCEDURE — 1036F TOBACCO NON-USER: CPT | Performed by: INTERNAL MEDICINE

## 2024-03-13 PROCEDURE — 82962 GLUCOSE BLOOD TEST: CPT | Performed by: INTERNAL MEDICINE

## 2024-03-13 PROCEDURE — 3051F HG A1C>EQUAL 7.0%<8.0%: CPT | Performed by: INTERNAL MEDICINE

## 2024-03-13 PROCEDURE — G8427 DOCREV CUR MEDS BY ELIG CLIN: HCPCS | Performed by: INTERNAL MEDICINE

## 2024-03-13 RX ORDER — SYRINGE W-NEEDLE,DISPOSAB,3 ML 25GX5/8"
SYRINGE, EMPTY DISPOSABLE MISCELLANEOUS
COMMUNITY
Start: 2024-03-06 | End: 2024-03-13 | Stop reason: SDUPTHER

## 2024-03-13 RX ORDER — SYRINGE W-NEEDLE,DISPOSAB,3 ML 25GX5/8"
SYRINGE, EMPTY DISPOSABLE MISCELLANEOUS
Qty: 50 EACH | Refills: 3 | Status: SHIPPED | OUTPATIENT
Start: 2024-03-13

## 2024-03-13 RX ORDER — TESTOSTERONE CYPIONATE 200 MG/ML
INJECTION, SOLUTION INTRAMUSCULAR
Qty: 10 ML | Refills: 3 | Status: SHIPPED | OUTPATIENT
Start: 2024-03-13 | End: 2024-04-19

## 2024-03-13 NOTE — PROGRESS NOTES
3/13/2024    Assessment:       Diagnosis Orders   1. Type 2 diabetes mellitus with other specified complication, unspecified whether long term insulin use (HCC)  POCT Glucose      2. Hypogonadism in male              PLAN:     Orders Placed This Encounter   Procedures    Testosterone, free, total     Standing Status:   Future     Standing Expiration Date:   3/13/2025    Hemoglobin A1C     Standing Status:   Future     Standing Expiration Date:   3/13/2025    Basic Metabolic Panel     Standing Status:   Future     Standing Expiration Date:   3/13/2025    POCT Glucose     Orders Placed This Encounter   Medications    testosterone cypionate (DEPOTESTOTERONE CYPIONATE) 200 MG/ML injection     Si.3 cc very 10 days     Dispense:  10 mL     Refill:  3     This request is for a new prescription for a controlled substance as required by Federal/State law.REFILLS NEEDED.    Syringe/Needle, Disp, (SYRINGE 3CC/20GX1\") 20G X 1\" 3 ML MISC     Sig: Use as directed     Dispense:  50 each     Refill:  3    metFORMIN (GLUCOPHAGE) 1000 MG tablet     Sig: Take 1 tablet by mouth 2 times daily (with meals)     Dispense:  60 tablet     Refill:  6    SITagliptin (JANUVIA) 100 MG tablet     Sig: Take 1 tablet by mouth daily     Dispense:  30 tablet     Refill:  3   Continue current dose of testosterone metformin Januvia A1c goal of less than 7      Orders Placed This Encounter   Procedures    POCT Glucose     No orders of the defined types were placed in this encounter.    No follow-ups on file.  Subjective:     Chief Complaint   Patient presents with    Diabetes    Hypogonadism    Obesity     Vitals:    24 1318 24 1325   BP: (!) 144/81 (!) 144/81   Pulse: 77    SpO2: 94%    Weight: 131.1 kg (289 lb)    Height: 1.803 m (5' 10.98\")      Wt Readings from Last 3 Encounters:   24 131.1 kg (289 lb)   11/15/23 130.2 kg (287 lb)   23 131.5 kg (290 lb)     BP Readings from Last 3 Encounters:   24 (!) 144/81

## 2024-03-20 ENCOUNTER — OFFICE VISIT (OUTPATIENT)
Dept: PULMONOLOGY | Age: 59
End: 2024-03-20
Payer: COMMERCIAL

## 2024-03-20 VITALS
HEIGHT: 70 IN | SYSTOLIC BLOOD PRESSURE: 140 MMHG | HEART RATE: 87 BPM | BODY MASS INDEX: 41.69 KG/M2 | WEIGHT: 291.2 LBS | OXYGEN SATURATION: 95 % | DIASTOLIC BLOOD PRESSURE: 80 MMHG | RESPIRATION RATE: 16 BRPM

## 2024-03-20 DIAGNOSIS — E66.9 OBESITY (BMI 30-39.9): ICD-10-CM

## 2024-03-20 DIAGNOSIS — G47.33 OSA ON CPAP: Primary | ICD-10-CM

## 2024-03-20 DIAGNOSIS — I26.02 ACUTE SADDLE PULMONARY EMBOLISM WITH ACUTE COR PULMONALE (HCC): ICD-10-CM

## 2024-03-20 DIAGNOSIS — I82.401 DEEP VEIN THROMBOSIS (DVT) OF RIGHT LOWER EXTREMITY, UNSPECIFIED CHRONICITY, UNSPECIFIED VEIN (HCC): ICD-10-CM

## 2024-03-20 PROCEDURE — G8484 FLU IMMUNIZE NO ADMIN: HCPCS | Performed by: INTERNAL MEDICINE

## 2024-03-20 PROCEDURE — 1036F TOBACCO NON-USER: CPT | Performed by: INTERNAL MEDICINE

## 2024-03-20 PROCEDURE — 99214 OFFICE O/P EST MOD 30 MIN: CPT | Performed by: INTERNAL MEDICINE

## 2024-03-20 PROCEDURE — 3077F SYST BP >= 140 MM HG: CPT | Performed by: INTERNAL MEDICINE

## 2024-03-20 PROCEDURE — G8417 CALC BMI ABV UP PARAM F/U: HCPCS | Performed by: INTERNAL MEDICINE

## 2024-03-20 PROCEDURE — 3017F COLORECTAL CA SCREEN DOC REV: CPT | Performed by: INTERNAL MEDICINE

## 2024-03-20 PROCEDURE — G8427 DOCREV CUR MEDS BY ELIG CLIN: HCPCS | Performed by: INTERNAL MEDICINE

## 2024-03-20 PROCEDURE — 3079F DIAST BP 80-89 MM HG: CPT | Performed by: INTERNAL MEDICINE

## 2024-03-20 NOTE — PROGRESS NOTES
need CPAP supply He said  sleep is restful with the CPAP use.He is compliant with CPAP therapy and benefiting with CPAP use.No snoring with CPAP use.     I reviewed compliance report with patient regarding CPAP therapy. He is using  CPAP for 30 days out of 30 days. Average usage of days used is 10 hours and 10 min , average AHI 2.3 with CPAP use.         2. Obesity (BMI 30-39.9)  He is advised try to lose weight. obesity related risk explained to the patient ,  Current weight:  132.1 kg (291 lb 3.2 oz) Lbs. BMI:  Body mass index is 41.78 kg/m².  Suggested weight control approaches, including dietary changes , exercise, behavioral modification.    3. Acute saddle pulmonary embolism with acute cor pulmonale (HCC) s/p thrombectomy  He had a PE thrombectomy done and significant amount of blood clot was pulled out. He also had DVT rt. Leg . he is on Eliquis.      Return in about 6 months (around 9/20/2024) for jose.      Bulmaro Gracia MD

## 2024-03-22 NOTE — CARE COORDINATION
Pt still see only CBC results without differential on MYChart , pt is concern if this was really added properly . Please call pt back .    [id number] and was informed they gave patient starter pack of Xarelto already and he has it in his room.

## 2024-06-13 ENCOUNTER — OFFICE VISIT (OUTPATIENT)
Dept: CARDIOLOGY CLINIC | Age: 59
End: 2024-06-13
Payer: COMMERCIAL

## 2024-06-13 VITALS
BODY MASS INDEX: 42.9 KG/M2 | DIASTOLIC BLOOD PRESSURE: 88 MMHG | HEART RATE: 89 BPM | SYSTOLIC BLOOD PRESSURE: 138 MMHG | WEIGHT: 299 LBS

## 2024-06-13 DIAGNOSIS — Z86.711 HISTORY OF PULMONARY EMBOLISM: ICD-10-CM

## 2024-06-13 DIAGNOSIS — R94.31 ABNORMAL EKG: ICD-10-CM

## 2024-06-13 DIAGNOSIS — Z86.718 HISTORY OF DVT (DEEP VEIN THROMBOSIS): ICD-10-CM

## 2024-06-13 DIAGNOSIS — I10 ESSENTIAL HYPERTENSION, BENIGN: Primary | ICD-10-CM

## 2024-06-13 DIAGNOSIS — G47.33 OSA ON CPAP: ICD-10-CM

## 2024-06-13 DIAGNOSIS — E66.9 OBESITY (BMI 30-39.9): ICD-10-CM

## 2024-06-13 PROCEDURE — 93000 ELECTROCARDIOGRAM COMPLETE: CPT | Performed by: INTERNAL MEDICINE

## 2024-06-13 PROCEDURE — 3079F DIAST BP 80-89 MM HG: CPT | Performed by: INTERNAL MEDICINE

## 2024-06-13 PROCEDURE — G8427 DOCREV CUR MEDS BY ELIG CLIN: HCPCS | Performed by: INTERNAL MEDICINE

## 2024-06-13 PROCEDURE — 3017F COLORECTAL CA SCREEN DOC REV: CPT | Performed by: INTERNAL MEDICINE

## 2024-06-13 PROCEDURE — 99214 OFFICE O/P EST MOD 30 MIN: CPT | Performed by: INTERNAL MEDICINE

## 2024-06-13 PROCEDURE — G8417 CALC BMI ABV UP PARAM F/U: HCPCS | Performed by: INTERNAL MEDICINE

## 2024-06-13 PROCEDURE — 1036F TOBACCO NON-USER: CPT | Performed by: INTERNAL MEDICINE

## 2024-06-13 PROCEDURE — 3075F SYST BP GE 130 - 139MM HG: CPT | Performed by: INTERNAL MEDICINE

## 2024-06-13 NOTE — PROGRESS NOTES
noted        Orders Placed This Encounter   Procedures    CT CARDIAC CALCIUM SCORING    EKG 12 Lead       ASSESSMENT:     Diagnosis Orders   1. Essential hypertension, benign  EKG 12 Lead    CT CARDIAC CALCIUM SCORING      2. ROSA on CPAP        3. History of pulmonary embolism        4. History of DVT (deep vein thrombosis)        5. Obesity (BMI 30-39.9)        6. Abnormal EKG  CT CARDIAC CALCIUM SCORING              PLAN:         As always, aggressive risk factor modification is strongly recommended. We should adhere to the JNC VIII guidelines for HTN management and the NCEP ATP III guidelines for LDL-C management.    Cardiac diet is always recommended with low fat, cholesterol, calories and sodium.    Continue medications at current doses.      Stay on Eliuiqs 5mg BID, for life    Ok to stop ASA.     Weight loss discussed.     Has seen dietician previously.     Follow up with Dr Yuan/medical weight loss.  ? Bariatric surgery evaluation in future.     Patient was advised and encouraged to check blood pressure at home or at a pharmacy, maintain a logbook, and also call us back if blood pressure are above the target ranges or if it is low. Patient clearly understands and agrees to the instructions.     We will need to continue to monitor muscle and liver enzymes, BUN, CR, and electrolytes.    Cardiac calcium score given risk factors.

## 2024-06-29 DIAGNOSIS — E11.69 TYPE 2 DIABETES MELLITUS WITH OTHER SPECIFIED COMPLICATION, UNSPECIFIED WHETHER LONG TERM INSULIN USE (HCC): ICD-10-CM

## 2024-07-01 RX ORDER — LANCETS 33 GAUGE
EACH MISCELLANEOUS
Qty: 100 EACH | Refills: 3 | Status: SHIPPED | OUTPATIENT
Start: 2024-07-01

## 2024-07-08 DIAGNOSIS — E11.69 TYPE 2 DIABETES MELLITUS WITH OTHER SPECIFIED COMPLICATION, UNSPECIFIED WHETHER LONG TERM INSULIN USE (HCC): ICD-10-CM

## 2024-07-08 DIAGNOSIS — E29.1 HYPOGONADISM IN MALE: ICD-10-CM

## 2024-07-08 LAB
ANION GAP SERPL CALCULATED.3IONS-SCNC: 16 MEQ/L (ref 9–15)
BUN SERPL-MCNC: 19 MG/DL (ref 8–23)
CALCIUM SERPL-MCNC: 9 MG/DL (ref 8.5–9.9)
CHLORIDE SERPL-SCNC: 102 MEQ/L (ref 95–107)
CO2 SERPL-SCNC: 21 MEQ/L (ref 20–31)
CREAT SERPL-MCNC: 1.05 MG/DL (ref 0.7–1.2)
ESTIMATED AVERAGE GLUCOSE: 177 MG/DL
GLUCOSE SERPL-MCNC: 130 MG/DL (ref 70–99)
HBA1C MFR BLD: 7.8 % (ref 4–6)
POTASSIUM SERPL-SCNC: 4.1 MEQ/L (ref 3.4–4.9)
SHBG SERPL-SCNC: 26 NMOL/L (ref 19–76)
SODIUM SERPL-SCNC: 139 MEQ/L (ref 135–144)
TESTOST FREE SERPL-MCNC: 84.5 PG/ML (ref 47–244)
TESTOST SERPL-MCNC: 368 NG/DL (ref 193–740)

## 2024-07-17 ENCOUNTER — OFFICE VISIT (OUTPATIENT)
Dept: ENDOCRINOLOGY | Age: 59
End: 2024-07-17
Payer: COMMERCIAL

## 2024-07-17 VITALS
BODY MASS INDEX: 42.09 KG/M2 | HEIGHT: 70 IN | DIASTOLIC BLOOD PRESSURE: 80 MMHG | HEART RATE: 69 BPM | OXYGEN SATURATION: 94 % | SYSTOLIC BLOOD PRESSURE: 146 MMHG | WEIGHT: 294 LBS

## 2024-07-17 DIAGNOSIS — E11.69 TYPE 2 DIABETES MELLITUS WITH OTHER SPECIFIED COMPLICATION, UNSPECIFIED WHETHER LONG TERM INSULIN USE (HCC): Primary | ICD-10-CM

## 2024-07-17 DIAGNOSIS — E66.01 MORBID OBESITY (HCC): ICD-10-CM

## 2024-07-17 DIAGNOSIS — E29.1 HYPOGONADISM IN MALE: ICD-10-CM

## 2024-07-17 LAB
CHP ED QC CHECK: NORMAL
GLUCOSE BLD-MCNC: 98 MG/DL

## 2024-07-17 PROCEDURE — 82962 GLUCOSE BLOOD TEST: CPT | Performed by: INTERNAL MEDICINE

## 2024-07-17 PROCEDURE — 99214 OFFICE O/P EST MOD 30 MIN: CPT | Performed by: INTERNAL MEDICINE

## 2024-07-17 PROCEDURE — G8417 CALC BMI ABV UP PARAM F/U: HCPCS | Performed by: INTERNAL MEDICINE

## 2024-07-17 PROCEDURE — 2022F DILAT RTA XM EVC RTNOPTHY: CPT | Performed by: INTERNAL MEDICINE

## 2024-07-17 PROCEDURE — 1036F TOBACCO NON-USER: CPT | Performed by: INTERNAL MEDICINE

## 2024-07-17 PROCEDURE — 3051F HG A1C>EQUAL 7.0%<8.0%: CPT | Performed by: INTERNAL MEDICINE

## 2024-07-17 PROCEDURE — 3079F DIAST BP 80-89 MM HG: CPT | Performed by: INTERNAL MEDICINE

## 2024-07-17 PROCEDURE — 3077F SYST BP >= 140 MM HG: CPT | Performed by: INTERNAL MEDICINE

## 2024-07-17 PROCEDURE — G8427 DOCREV CUR MEDS BY ELIG CLIN: HCPCS | Performed by: INTERNAL MEDICINE

## 2024-07-17 PROCEDURE — 3017F COLORECTAL CA SCREEN DOC REV: CPT | Performed by: INTERNAL MEDICINE

## 2024-07-17 RX ORDER — TESTOSTERONE CYPIONATE 200 MG/ML
INJECTION, SOLUTION INTRAMUSCULAR
Qty: 10 ML | Refills: 3 | Status: SHIPPED | OUTPATIENT
Start: 2024-07-17 | End: 2024-08-23

## 2024-07-17 RX ORDER — BLOOD SUGAR DIAGNOSTIC
STRIP MISCELLANEOUS
Qty: 100 STRIP | Refills: 3 | Status: SHIPPED | OUTPATIENT
Start: 2024-07-17

## 2024-07-17 RX ORDER — SYRINGE W-NEEDLE,DISPOSAB,3 ML 25GX5/8"
SYRINGE, EMPTY DISPOSABLE MISCELLANEOUS
Qty: 50 EACH | Refills: 3 | Status: SHIPPED | OUTPATIENT
Start: 2024-07-17

## 2024-07-17 RX ORDER — LISINOPRIL 5 MG/1
2.5 TABLET ORAL DAILY
Qty: 30 TABLET | Refills: 3 | Status: SHIPPED | OUTPATIENT
Start: 2024-07-17

## 2024-07-17 RX ORDER — LANCETS 33 GAUGE
EACH MISCELLANEOUS
Qty: 100 EACH | Refills: 3 | Status: SHIPPED | OUTPATIENT
Start: 2024-07-17

## 2024-07-17 ASSESSMENT — ENCOUNTER SYMPTOMS: EYES NEGATIVE: 1

## 2024-07-17 NOTE — PROGRESS NOTES
relief.        Latest Reference Range & Units 03/13/24 13:23 07/08/24 07:35 07/08/24 07:38   Glucose 70 - 99 mg/dL  130 (H)    Hemoglobin A1C 4.0 - 6.0 %   7.8 (H)   eAG (mg/dL) mg/dL   177   POC Glucose  93     Sex Hormone Binding 19 - 76 nmol/L  26    Testosterone 193 - 740 ng/dL  368    Testosterone, Free 47.0 - 244.0 pg/mL  84.5    (H): Data is abnormally high      Past Medical History:   Diagnosis Date    Abnormal EKG 12/03/2021    Chronic back pain     Diabetes (HCC)     GERD (gastroesophageal reflux disease)     High cholesterol     History of DVT (deep vein thrombosis) 06/03/2021    History of pulmonary embolism 06/03/2021    Hx of blood clots     Hypertension     IBS (irritable bowel syndrome)     Kidney, malrotation     Sleep apnea      Past Surgical History:   Procedure Laterality Date    COLONOSCOPY      ENDOSCOPY, COLON, DIAGNOSTIC      PROSTATE SURGERY N/A 2/8/2023    TRANSRECTAL ULTRASOUND GUIDED PROSTATE BIOPSY performed by Daljit Eid MD at [facility] [place] OR    PULMONARY EMBOLISM SURGERY  2021     Social History     Socioeconomic History    Marital status: Single     Spouse name: Not on file    Number of children: Not on file    Years of education: Not on file    Highest education level: Not on file   Occupational History    Not on file   Tobacco Use    Smoking status: Never     Passive exposure: Never    Smokeless tobacco: Never   Vaping Use    Vaping Use: Never used   Substance and Sexual Activity    Alcohol use: No    Drug use: No    Sexual activity: Yes   Other Topics Concern    Not on file   Social History Narrative    Not on file     Social Determinants of Health     Financial Resource Strain: Low Risk  (4/17/2023)    Overall Financial Resource Strain (CARDIA)     Difficulty of Paying Living Expenses: Not hard at all   Food Insecurity: Not on file (4/17/2023)   Transportation Needs: Unknown (4/17/2023)    PRAPARE - Transportation     Lack of Transportation (Medical): Not on file     Lack of

## 2024-09-14 DIAGNOSIS — E11.69 TYPE 2 DIABETES MELLITUS WITH OTHER SPECIFIED COMPLICATION, UNSPECIFIED WHETHER LONG TERM INSULIN USE (HCC): ICD-10-CM

## 2024-09-16 RX ORDER — SITAGLIPTIN 100 MG/1
100 TABLET, FILM COATED ORAL DAILY
Qty: 30 TABLET | Refills: 3 | Status: SHIPPED | OUTPATIENT
Start: 2024-09-16

## 2024-09-21 DIAGNOSIS — E11.69 TYPE 2 DIABETES MELLITUS WITH OTHER SPECIFIED COMPLICATION, UNSPECIFIED WHETHER LONG TERM INSULIN USE (HCC): ICD-10-CM

## 2024-09-23 RX ORDER — BLOOD SUGAR DIAGNOSTIC
STRIP MISCELLANEOUS
Qty: 100 STRIP | Refills: 3 | Status: SHIPPED | OUTPATIENT
Start: 2024-09-23

## 2024-09-25 ENCOUNTER — OFFICE VISIT (OUTPATIENT)
Dept: PULMONOLOGY | Age: 59
End: 2024-09-25
Payer: COMMERCIAL

## 2024-09-25 VITALS
WEIGHT: 299 LBS | SYSTOLIC BLOOD PRESSURE: 146 MMHG | HEART RATE: 82 BPM | DIASTOLIC BLOOD PRESSURE: 102 MMHG | OXYGEN SATURATION: 96 % | BODY MASS INDEX: 42.9 KG/M2

## 2024-09-25 DIAGNOSIS — Z86.711 HISTORY OF PULMONARY EMBOLISM: ICD-10-CM

## 2024-09-25 DIAGNOSIS — E66.9 OBESITY (BMI 30-39.9): ICD-10-CM

## 2024-09-25 DIAGNOSIS — G47.33 OSA ON CPAP: Primary | ICD-10-CM

## 2024-09-25 PROCEDURE — G8417 CALC BMI ABV UP PARAM F/U: HCPCS | Performed by: INTERNAL MEDICINE

## 2024-09-25 PROCEDURE — 99214 OFFICE O/P EST MOD 30 MIN: CPT | Performed by: INTERNAL MEDICINE

## 2024-09-25 PROCEDURE — 3077F SYST BP >= 140 MM HG: CPT | Performed by: INTERNAL MEDICINE

## 2024-09-25 PROCEDURE — G8427 DOCREV CUR MEDS BY ELIG CLIN: HCPCS | Performed by: INTERNAL MEDICINE

## 2024-09-25 PROCEDURE — 1036F TOBACCO NON-USER: CPT | Performed by: INTERNAL MEDICINE

## 2024-09-25 PROCEDURE — 3080F DIAST BP >= 90 MM HG: CPT | Performed by: INTERNAL MEDICINE

## 2024-09-25 PROCEDURE — 3017F COLORECTAL CA SCREEN DOC REV: CPT | Performed by: INTERNAL MEDICINE

## 2024-09-25 ASSESSMENT — ENCOUNTER SYMPTOMS
VOMITING: 0
ABDOMINAL PAIN: 0
NAUSEA: 0
COUGH: 0
VOICE CHANGE: 0
RHINORRHEA: 0
CHEST TIGHTNESS: 0
SORE THROAT: 0
SHORTNESS OF BREATH: 0
DIARRHEA: 0
WHEEZING: 0
EYE ITCHING: 0

## 2024-10-02 DIAGNOSIS — E11.69 TYPE 2 DIABETES MELLITUS WITH OTHER SPECIFIED COMPLICATION, UNSPECIFIED WHETHER LONG TERM INSULIN USE (HCC): ICD-10-CM

## 2024-10-03 RX ORDER — BLOOD SUGAR DIAGNOSTIC
STRIP MISCELLANEOUS
Qty: 100 STRIP | Refills: 3 | OUTPATIENT
Start: 2024-10-03

## 2024-10-12 DIAGNOSIS — E11.69 TYPE 2 DIABETES MELLITUS WITH OTHER SPECIFIED COMPLICATION, UNSPECIFIED WHETHER LONG TERM INSULIN USE (HCC): ICD-10-CM

## 2024-10-14 RX ORDER — SITAGLIPTIN 100 MG/1
100 TABLET, FILM COATED ORAL DAILY
Qty: 30 TABLET | Refills: 3 | Status: SHIPPED | OUTPATIENT
Start: 2024-10-14

## 2024-11-07 DIAGNOSIS — E11.69 TYPE 2 DIABETES MELLITUS WITH OTHER SPECIFIED COMPLICATION, UNSPECIFIED WHETHER LONG TERM INSULIN USE (HCC): ICD-10-CM

## 2024-11-07 DIAGNOSIS — E29.1 HYPOGONADISM IN MALE: ICD-10-CM

## 2024-11-07 LAB
ANION GAP SERPL CALCULATED.3IONS-SCNC: 13 MEQ/L (ref 9–15)
BUN SERPL-MCNC: 17 MG/DL (ref 8–23)
CALCIUM SERPL-MCNC: 8.9 MG/DL (ref 8.5–9.9)
CHLORIDE SERPL-SCNC: 103 MEQ/L (ref 95–107)
CO2 SERPL-SCNC: 25 MEQ/L (ref 20–31)
CREAT SERPL-MCNC: 1.09 MG/DL (ref 0.7–1.2)
ERYTHROCYTE [DISTWIDTH] IN BLOOD BY AUTOMATED COUNT: 13.8 % (ref 11.5–14.5)
ESTIMATED AVERAGE GLUCOSE: 166 MG/DL
GLUCOSE SERPL-MCNC: 113 MG/DL (ref 70–99)
HBA1C MFR BLD: 7.4 % (ref 4–6)
HCT VFR BLD AUTO: 52.7 % (ref 42–52)
HGB BLD-MCNC: 17.5 G/DL (ref 14–18)
MCH RBC QN AUTO: 30.9 PG (ref 27–31.3)
MCHC RBC AUTO-ENTMCNC: 33.2 % (ref 33–37)
MCV RBC AUTO: 92.9 FL (ref 79–92.2)
PLATELET # BLD AUTO: 229 K/UL (ref 130–400)
POTASSIUM SERPL-SCNC: 4.3 MEQ/L (ref 3.4–4.9)
RBC # BLD AUTO: 5.67 M/UL (ref 4.7–6.1)
SHBG SERPL-SCNC: 34 NMOL/L (ref 19–76)
SODIUM SERPL-SCNC: 141 MEQ/L (ref 135–144)
TESTOST FREE SERPL-MCNC: 203.1 PG/ML (ref 47–244)
TESTOST SERPL-MCNC: 881 NG/DL (ref 193–740)
WBC # BLD AUTO: 6.8 K/UL (ref 4.8–10.8)

## 2024-11-11 RX ORDER — LISINOPRIL 5 MG/1
2.5 TABLET ORAL DAILY
Qty: 30 TABLET | Refills: 1 | OUTPATIENT
Start: 2024-11-11

## 2024-11-18 ENCOUNTER — OFFICE VISIT (OUTPATIENT)
Dept: ENDOCRINOLOGY | Age: 59
End: 2024-11-18
Payer: COMMERCIAL

## 2024-11-18 VITALS
WEIGHT: 292 LBS | DIASTOLIC BLOOD PRESSURE: 77 MMHG | SYSTOLIC BLOOD PRESSURE: 163 MMHG | HEIGHT: 70 IN | OXYGEN SATURATION: 94 % | BODY MASS INDEX: 41.8 KG/M2

## 2024-11-18 DIAGNOSIS — E66.01 MORBID OBESITY: ICD-10-CM

## 2024-11-18 DIAGNOSIS — E11.69 TYPE 2 DIABETES MELLITUS WITH OTHER SPECIFIED COMPLICATION, UNSPECIFIED WHETHER LONG TERM INSULIN USE (HCC): Primary | ICD-10-CM

## 2024-11-18 DIAGNOSIS — E29.1 HYPOGONADISM IN MALE: ICD-10-CM

## 2024-11-18 LAB
CHP ED QC CHECK: NORMAL
GLUCOSE BLD-MCNC: 152 MG/DL

## 2024-11-18 PROCEDURE — 2022F DILAT RTA XM EVC RTNOPTHY: CPT | Performed by: INTERNAL MEDICINE

## 2024-11-18 PROCEDURE — 82962 GLUCOSE BLOOD TEST: CPT | Performed by: INTERNAL MEDICINE

## 2024-11-18 PROCEDURE — G8417 CALC BMI ABV UP PARAM F/U: HCPCS | Performed by: INTERNAL MEDICINE

## 2024-11-18 PROCEDURE — G8484 FLU IMMUNIZE NO ADMIN: HCPCS | Performed by: INTERNAL MEDICINE

## 2024-11-18 PROCEDURE — 99214 OFFICE O/P EST MOD 30 MIN: CPT | Performed by: INTERNAL MEDICINE

## 2024-11-18 PROCEDURE — 3078F DIAST BP <80 MM HG: CPT | Performed by: INTERNAL MEDICINE

## 2024-11-18 PROCEDURE — G8427 DOCREV CUR MEDS BY ELIG CLIN: HCPCS | Performed by: INTERNAL MEDICINE

## 2024-11-18 PROCEDURE — 3077F SYST BP >= 140 MM HG: CPT | Performed by: INTERNAL MEDICINE

## 2024-11-18 PROCEDURE — 3051F HG A1C>EQUAL 7.0%<8.0%: CPT | Performed by: INTERNAL MEDICINE

## 2024-11-18 PROCEDURE — 3017F COLORECTAL CA SCREEN DOC REV: CPT | Performed by: INTERNAL MEDICINE

## 2024-11-18 PROCEDURE — 1036F TOBACCO NON-USER: CPT | Performed by: INTERNAL MEDICINE

## 2024-11-18 RX ORDER — LISINOPRIL 5 MG/1
2.5 TABLET ORAL DAILY
Qty: 30 TABLET | Refills: 3 | Status: SHIPPED | OUTPATIENT
Start: 2024-11-18

## 2024-11-18 RX ORDER — TESTOSTERONE CYPIONATE 200 MG/ML
INJECTION, SOLUTION INTRAMUSCULAR
Qty: 10 ML | Refills: 3 | Status: SHIPPED | OUTPATIENT
Start: 2024-11-18 | End: 2024-12-25

## 2024-11-18 RX ORDER — TIRZEPATIDE 2.5 MG/.5ML
INJECTION, SOLUTION SUBCUTANEOUS
Qty: 3 ML | Refills: 5 | Status: SHIPPED | OUTPATIENT
Start: 2024-11-18

## 2024-11-18 RX ORDER — LANCETS 33 GAUGE
EACH MISCELLANEOUS
Qty: 100 EACH | Refills: 3 | Status: SHIPPED | OUTPATIENT
Start: 2024-11-18

## 2024-11-18 RX ORDER — BLOOD SUGAR DIAGNOSTIC
STRIP MISCELLANEOUS
Qty: 100 STRIP | Refills: 3 | Status: SHIPPED | OUTPATIENT
Start: 2024-11-18

## 2024-11-18 RX ORDER — SYRINGE W-NEEDLE,DISPOSAB,3 ML 25GX5/8"
SYRINGE, EMPTY DISPOSABLE MISCELLANEOUS
Qty: 50 EACH | Refills: 3 | Status: SHIPPED | OUTPATIENT
Start: 2024-11-18

## 2024-11-18 NOTE — PROGRESS NOTES
CHOL 206 (H) 11/10/2023    CHOL 169 04/22/2021    CHOL 264 (H) 05/21/2013    TRIG 163 (H) 11/10/2023    TRIG 173 (H) 04/22/2021    TRIG 516 (H) 05/21/2013     No results found for: \"TESTM\"  Lab Results   Component Value Date    TSH 0.863 05/21/2013     No results found for: \"TPOABS\"    Review of Systems   Constitutional:  Negative for weight loss.   Cardiovascular: Negative.    Endocrine: Negative for polydipsia and polyuria.   Psychiatric/Behavioral:  Positive for sleep disturbance.    All other systems reviewed and are negative.      Objective:   Physical Exam  Vitals reviewed.   Constitutional:       General: He is not in acute distress.     Appearance: Normal appearance. He is obese.   HENT:      Head: Normocephalic and atraumatic.      Right Ear: External ear normal.      Left Ear: External ear normal.      Nose: Nose normal.   Eyes:      General: No scleral icterus.        Right eye: No discharge.         Left eye: No discharge.      Extraocular Movements: Extraocular movements intact.      Conjunctiva/sclera: Conjunctivae normal.   Cardiovascular:      Rate and Rhythm: Normal rate.   Pulmonary:      Effort: Pulmonary effort is normal.   Musculoskeletal:         General: Normal range of motion.      Cervical back: Normal range of motion and neck supple.      Right lower leg: No edema.      Left lower leg: No edema.   Skin:     Findings: No lesion or rash.   Neurological:      General: No focal deficit present.      Mental Status: He is alert and oriented to person, place, and time.   Psychiatric:         Mood and Affect: Mood normal.         Behavior: Behavior normal.

## 2024-11-20 ENCOUNTER — TELEPHONE (OUTPATIENT)
Dept: ENDOCRINOLOGY | Age: 59
End: 2024-11-20

## 2024-11-20 NOTE — TELEPHONE ENCOUNTER
Patient needs PA for Mounjaro 205MG/0.5ML auto injectors.    [place]:[id number]  Patient Name:Anoop Singleton  : 1965

## 2024-11-20 NOTE — TELEPHONE ENCOUNTER
11- office note not signed/completed.  Cannot submit prior authorization request until this has been done.

## 2024-11-22 ASSESSMENT — ENCOUNTER SYMPTOMS: VISUAL CHANGE: 0

## 2024-11-22 NOTE — TELEPHONE ENCOUNTER
Prior authorization has been started on Cover My Meds for  Mounjaro 2.5 mg clinical uploaded, authorization pending Key: [id number]  PA Case ID #: [id number]

## 2024-11-25 NOTE — TELEPHONE ENCOUNTER
Patient's insurance has denied coverage of Mounjaro 2.5 mg for the following reason(s):    Coverage is provided when the member meets all the followin.) Member has a history of at least 120 days of therapy with THREE preferred medications [ONE of the 120 day trials must be Byetta (5 mcg and 10 mcg), Victoza (18 MG/3 ML PEN)(Brand name is preferred by your plan) or Trulicity (0.75 mg, 1.5 mg, 3 mg and 4.5 mg)], which include but are not limited to: Farxiga 5 and 10 mg (Brand name is preferred by your plan), Glimepiride, Glipizide, Invokana 100 mg and 300 mg, Januvia, Jardiance 10 and 25 mg and Metformin IR and ER (generic of Glucophage XR) and,   2.) Member has had an inadequate clinical response (the inability to reach A1C goal (less than 7%) after at least 120 days of current regimen, with use of two or more drugs concomitantly per ADA (American Diabetes Association) guidelines and,   3.) Member has documented adherence and appropriate dose escalation (must achieve maximum recommended dose or document that maximum recommended dose is not tolerated or is clinically inappropriate) and,   4.) Documentation includes a patient specific A1C goal if less than 7%.

## 2024-11-25 NOTE — TELEPHONE ENCOUNTER
Left patient a message , PA for mounjaro was denied. Will patient try trulicity? That is covered on there insurance

## 2025-01-05 RX ORDER — APIXABAN 5 MG/1
5 TABLET, FILM COATED ORAL 2 TIMES DAILY
Qty: 180 TABLET | Refills: 3 | Status: SHIPPED | OUTPATIENT
Start: 2025-01-05

## 2025-03-10 DIAGNOSIS — E11.69 TYPE 2 DIABETES MELLITUS WITH OTHER SPECIFIED COMPLICATION, UNSPECIFIED WHETHER LONG TERM INSULIN USE (HCC): ICD-10-CM

## 2025-03-11 RX ORDER — SITAGLIPTIN 100 MG/1
100 TABLET, FILM COATED ORAL DAILY
Qty: 30 TABLET | Refills: 4 | Status: SHIPPED | OUTPATIENT
Start: 2025-03-11

## 2025-03-26 ENCOUNTER — OFFICE VISIT (OUTPATIENT)
Dept: PULMONOLOGY | Age: 60
End: 2025-03-26
Payer: COMMERCIAL

## 2025-03-26 VITALS
HEART RATE: 82 BPM | DIASTOLIC BLOOD PRESSURE: 98 MMHG | SYSTOLIC BLOOD PRESSURE: 160 MMHG | OXYGEN SATURATION: 96 % | BODY MASS INDEX: 41.32 KG/M2 | WEIGHT: 288 LBS

## 2025-03-26 DIAGNOSIS — I26.02 ACUTE SADDLE PULMONARY EMBOLISM WITH ACUTE COR PULMONALE (HCC): ICD-10-CM

## 2025-03-26 DIAGNOSIS — E66.9 OBESITY (BMI 30-39.9): ICD-10-CM

## 2025-03-26 DIAGNOSIS — G47.33 OSA ON CPAP: Primary | ICD-10-CM

## 2025-03-26 DIAGNOSIS — I82.401 DEEP VEIN THROMBOSIS (DVT) OF RIGHT LOWER EXTREMITY, UNSPECIFIED CHRONICITY, UNSPECIFIED VEIN: ICD-10-CM

## 2025-03-26 PROCEDURE — 3017F COLORECTAL CA SCREEN DOC REV: CPT | Performed by: INTERNAL MEDICINE

## 2025-03-26 PROCEDURE — G8427 DOCREV CUR MEDS BY ELIG CLIN: HCPCS | Performed by: INTERNAL MEDICINE

## 2025-03-26 PROCEDURE — G8417 CALC BMI ABV UP PARAM F/U: HCPCS | Performed by: INTERNAL MEDICINE

## 2025-03-26 PROCEDURE — 3080F DIAST BP >= 90 MM HG: CPT | Performed by: INTERNAL MEDICINE

## 2025-03-26 PROCEDURE — 1036F TOBACCO NON-USER: CPT | Performed by: INTERNAL MEDICINE

## 2025-03-26 PROCEDURE — 3077F SYST BP >= 140 MM HG: CPT | Performed by: INTERNAL MEDICINE

## 2025-03-26 PROCEDURE — 99214 OFFICE O/P EST MOD 30 MIN: CPT | Performed by: INTERNAL MEDICINE

## 2025-03-26 NOTE — PROGRESS NOTES
Subjective:             Anoop Singleton is a 59 y.o. male who complains today of:     Chief Complaint   Patient presents with    Follow-up     6m f/u on ROSA       HPI  He is using CPAP with  11 centimeters of H2O with heated humidity.  He is using CPAP for about 8-9  hours every night.  He is using CPAP with Nasal  Mask.  he need CPAP supply   He said  sleep is restful with the CPAP use.  He is compliant with CPAP therapy and benefiting with CPAP use.  No snoring with CPAP use.  No complaint of daytime sleepiness or tiredness with CPAP use.  He denies taking naps.  No sleepiness with driving.   He denies difficulty falling asleep or staying asleep.     I reviewed compliance report with patient regarding CPAP therapy. He is using  CPAP for 30 days out of 30 days. Average usage of days used is 8 hours and 37 min , average AHI 1.2  with CPAP use.          He had a PE and DVT and PE  thrombectomy done and significant amount of blood clot was pulled out.  he is on Eliquis.    Allergies:  Quinolones  Past Medical History:   Diagnosis Date    Abnormal EKG 12/03/2021    Chronic back pain     Diabetes (HCC)     GERD (gastroesophageal reflux disease)     High cholesterol     History of DVT (deep vein thrombosis) 06/03/2021    History of pulmonary embolism 06/03/2021    Hx of blood clots     Hypertension     IBS (irritable bowel syndrome)     Kidney, malrotation     Sleep apnea      Past Surgical History:   Procedure Laterality Date    COLONOSCOPY      ENDOSCOPY, COLON, DIAGNOSTIC      PROSTATE SURGERY N/A 2/8/2023    TRANSRECTAL ULTRASOUND GUIDED PROSTATE BIOPSY performed by Daljit Eid MD at [facility] [place] OR    PULMONARY EMBOLISM SURGERY  2021     Family History   Problem Relation Age of Onset    Asthma Mother     Heart Disease Father     Other Sister         Sarcoidosis    Cirrhosis Sister      Social History     Socioeconomic History    Marital status: Single     Spouse name: Not on file    Number of children: Not on file

## 2025-04-07 DIAGNOSIS — E11.69 TYPE 2 DIABETES MELLITUS WITH OTHER SPECIFIED COMPLICATION, UNSPECIFIED WHETHER LONG TERM INSULIN USE (HCC): ICD-10-CM

## 2025-04-07 RX ORDER — LISINOPRIL 5 MG/1
2.5 TABLET ORAL DAILY
Qty: 30 TABLET | Refills: 4 | Status: SHIPPED | OUTPATIENT
Start: 2025-04-07

## 2025-05-13 DIAGNOSIS — E29.1 HYPOGONADISM IN MALE: ICD-10-CM

## 2025-05-13 DIAGNOSIS — E11.69 TYPE 2 DIABETES MELLITUS WITH OTHER SPECIFIED COMPLICATION, UNSPECIFIED WHETHER LONG TERM INSULIN USE (HCC): ICD-10-CM

## 2025-05-13 LAB
ANION GAP SERPL CALCULATED.3IONS-SCNC: 14 MEQ/L (ref 9–15)
BUN SERPL-MCNC: 20 MG/DL (ref 8–23)
CALCIUM SERPL-MCNC: 9 MG/DL (ref 8.5–9.9)
CHLORIDE SERPL-SCNC: 100 MEQ/L (ref 95–107)
CHOLEST SERPL-MCNC: 242 MG/DL (ref 0–199)
CO2 SERPL-SCNC: 23 MEQ/L (ref 20–31)
CREAT SERPL-MCNC: 1.24 MG/DL (ref 0.7–1.2)
CREAT UR-MCNC: 85 MG/DL
ESTIMATED AVERAGE GLUCOSE: 194 MG/DL
GLUCOSE SERPL-MCNC: 176 MG/DL (ref 70–99)
HBA1C MFR BLD: 8.4 % (ref 4–6)
HDLC SERPL-MCNC: 40 MG/DL (ref 40–59)
LDLC SERPL CALC-MCNC: 157 MG/DL (ref 0–129)
MICROALBUMIN UR-MCNC: 2.1 MG/DL
MICROALBUMIN/CREAT UR-RTO: 24.7 MG/G (ref 0–30)
POTASSIUM SERPL-SCNC: 4.6 MEQ/L (ref 3.4–4.9)
SHBG SERPL-SCNC: 30 NMOL/L (ref 19–76)
SODIUM SERPL-SCNC: 137 MEQ/L (ref 135–144)
TESTOST FREE SERPL-MCNC: 70.7 PG/ML (ref 47–244)
TESTOST SERPL-MCNC: 336 NG/DL (ref 193–740)
TRIGL SERPL-MCNC: 225 MG/DL (ref 0–150)

## 2025-05-21 ENCOUNTER — OFFICE VISIT (OUTPATIENT)
Age: 60
End: 2025-05-21
Payer: COMMERCIAL

## 2025-05-21 VITALS
SYSTOLIC BLOOD PRESSURE: 157 MMHG | DIASTOLIC BLOOD PRESSURE: 91 MMHG | WEIGHT: 288 LBS | HEART RATE: 81 BPM | BODY MASS INDEX: 41.23 KG/M2 | HEIGHT: 70 IN

## 2025-05-21 DIAGNOSIS — E11.69 TYPE 2 DIABETES MELLITUS WITH OTHER SPECIFIED COMPLICATION, UNSPECIFIED WHETHER LONG TERM INSULIN USE (HCC): Primary | ICD-10-CM

## 2025-05-21 DIAGNOSIS — E29.1 HYPOGONADISM IN MALE: ICD-10-CM

## 2025-05-21 DIAGNOSIS — E66.01 MORBID OBESITY (HCC): ICD-10-CM

## 2025-05-21 DIAGNOSIS — I10 PRIMARY HYPERTENSION: ICD-10-CM

## 2025-05-21 LAB
CHP ED QC CHECK: NORMAL
GLUCOSE BLD-MCNC: 120 MG/DL

## 2025-05-21 PROCEDURE — G8417 CALC BMI ABV UP PARAM F/U: HCPCS | Performed by: INTERNAL MEDICINE

## 2025-05-21 PROCEDURE — 3079F DIAST BP 80-89 MM HG: CPT | Performed by: INTERNAL MEDICINE

## 2025-05-21 PROCEDURE — 3052F HG A1C>EQUAL 8.0%<EQUAL 9.0%: CPT | Performed by: INTERNAL MEDICINE

## 2025-05-21 PROCEDURE — 99213 OFFICE O/P EST LOW 20 MIN: CPT | Performed by: INTERNAL MEDICINE

## 2025-05-21 PROCEDURE — 82962 GLUCOSE BLOOD TEST: CPT | Performed by: INTERNAL MEDICINE

## 2025-05-21 PROCEDURE — G8427 DOCREV CUR MEDS BY ELIG CLIN: HCPCS | Performed by: INTERNAL MEDICINE

## 2025-05-21 PROCEDURE — PBSHW POCT GLUCOSE: Performed by: INTERNAL MEDICINE

## 2025-05-21 PROCEDURE — 1036F TOBACCO NON-USER: CPT | Performed by: INTERNAL MEDICINE

## 2025-05-21 PROCEDURE — 2022F DILAT RTA XM EVC RTNOPTHY: CPT | Performed by: INTERNAL MEDICINE

## 2025-05-21 PROCEDURE — 3077F SYST BP >= 140 MM HG: CPT | Performed by: INTERNAL MEDICINE

## 2025-05-21 PROCEDURE — 3017F COLORECTAL CA SCREEN DOC REV: CPT | Performed by: INTERNAL MEDICINE

## 2025-05-21 PROCEDURE — 99214 OFFICE O/P EST MOD 30 MIN: CPT | Performed by: INTERNAL MEDICINE

## 2025-05-21 RX ORDER — TESTOSTERONE CYPIONATE 200 MG/ML
INJECTION, SOLUTION INTRAMUSCULAR
Qty: 10 ML | Refills: 3 | Status: SHIPPED | OUTPATIENT
Start: 2025-05-21 | End: 2025-06-27

## 2025-05-21 RX ORDER — LOSARTAN POTASSIUM AND HYDROCHLOROTHIAZIDE 25; 100 MG/1; MG/1
1 TABLET ORAL DAILY
Qty: 30 TABLET | Refills: 3 | Status: SHIPPED | OUTPATIENT
Start: 2025-05-21

## 2025-05-21 RX ORDER — GLIMEPIRIDE 2 MG/1
TABLET ORAL
Qty: 30 TABLET | Refills: 3 | Status: SHIPPED | OUTPATIENT
Start: 2025-05-21

## 2025-05-21 RX ORDER — SYRINGE W-NEEDLE,DISPOSAB,3 ML 25GX5/8"
SYRINGE, EMPTY DISPOSABLE MISCELLANEOUS
Qty: 50 EACH | Refills: 3 | Status: SHIPPED | OUTPATIENT
Start: 2025-05-21

## 2025-05-21 RX ORDER — LISINOPRIL 5 MG/1
2.5 TABLET ORAL DAILY
Qty: 30 TABLET | Refills: 4 | Status: CANCELLED | OUTPATIENT
Start: 2025-05-21

## 2025-05-21 NOTE — PROGRESS NOTES
2025    Assessment:       Diagnosis Orders   1. Type 2 diabetes mellitus with other specified complication, unspecified whether long term insulin use (HCC)  POCT Glucose    Basic Metabolic Panel    Hemoglobin A1C     DIABETES FOOT EXAM    SITagliptin (JANUVIA) 100 MG tablet    metFORMIN (GLUCOPHAGE) 1000 MG tablet      2. Hypogonadism in male  CBC    Testosterone, free, total    testosterone cypionate (DEPOTESTOTERONE CYPIONATE) 200 MG/ML injection    Syringe/Needle, Disp, (SYRINGE 3CC/20GX1\") 20G X 1\" 3 ML MISC      3. Morbid obesity (HCC)        4. Primary hypertension              PLAN:     Orders Placed This Encounter   Procedures    Basic Metabolic Panel     Standing Status:   Future     Expected Date:   2025     Expiration Date:   2026    Hemoglobin A1C     Standing Status:   Future     Expected Date:   2025     Expiration Date:   2026    CBC     Standing Status:   Future     Expected Date:   2025     Expiration Date:   2026    Testosterone, free, total     Standing Status:   Future     Expected Date:   2025     Expiration Date:   2026    POCT Glucose     DIABETES FOOT EXAM     Orders Placed This Encounter   Medications    testosterone cypionate (DEPOTESTOTERONE CYPIONATE) 200 MG/ML injection     Si.3 cc very 10 days     Dispense:  10 mL     Refill:  3     This request is for a new prescription for a controlled substance as required by Federal/State law.REFILLS NEEDED.    Syringe/Needle, Disp, (SYRINGE 3CC/20GX1\") 20G X 1\" 3 ML MISC     Sig: Use as directed     Dispense:  50 each     Refill:  3    SITagliptin (JANUVIA) 100 MG tablet     Sig: Take 1 tablet by mouth daily     Dispense:  30 tablet     Refill:  4    metFORMIN (GLUCOPHAGE) 1000 MG tablet     Sig: Take 1 tablet by mouth 2 times daily (with meals)     Dispense:  60 tablet     Refill:  6    glimepiride (AMARYL) 2 MG tablet     Sig: Take 1 po at dinner     Dispense:  30 tablet     Refill:  3

## 2025-05-31 DIAGNOSIS — E11.69 TYPE 2 DIABETES MELLITUS WITH OTHER SPECIFIED COMPLICATION, UNSPECIFIED WHETHER LONG TERM INSULIN USE (HCC): ICD-10-CM

## 2025-06-02 RX ORDER — BLOOD SUGAR DIAGNOSTIC
STRIP MISCELLANEOUS
Qty: 100 STRIP | Refills: 3 | Status: SHIPPED | OUTPATIENT
Start: 2025-06-02

## 2025-06-12 ENCOUNTER — OFFICE VISIT (OUTPATIENT)
Age: 60
End: 2025-06-12
Payer: COMMERCIAL

## 2025-06-12 VITALS
SYSTOLIC BLOOD PRESSURE: 130 MMHG | WEIGHT: 289 LBS | BODY MASS INDEX: 41.47 KG/M2 | DIASTOLIC BLOOD PRESSURE: 80 MMHG | HEART RATE: 108 BPM

## 2025-06-12 DIAGNOSIS — I10 ESSENTIAL HYPERTENSION, BENIGN: Primary | ICD-10-CM

## 2025-06-12 PROCEDURE — 93005 ELECTROCARDIOGRAM TRACING: CPT | Performed by: INTERNAL MEDICINE

## 2025-06-12 PROCEDURE — 99213 OFFICE O/P EST LOW 20 MIN: CPT | Performed by: INTERNAL MEDICINE

## 2025-06-12 RX ORDER — ATORVASTATIN CALCIUM 20 MG/1
20 TABLET, FILM COATED ORAL DAILY
Qty: 90 TABLET | Refills: 3 | Status: SHIPPED | OUTPATIENT
Start: 2025-06-12

## 2025-06-12 NOTE — PROGRESS NOTES
weight loss.  ? Bariatric surgery evaluation in future.     Patient was advised and encouraged to check blood pressure at home or at a pharmacy, maintain a logbook, and also call us back if blood pressure are above the target ranges or if it is low. Patient clearly understands and agrees to the instructions.     We will need to continue to monitor muscle and liver enzymes, BUN, CR, and electrolytes.    Cardiac calcium score given risk factors.

## 2025-06-20 DIAGNOSIS — E11.69 TYPE 2 DIABETES MELLITUS WITH OTHER SPECIFIED COMPLICATION, UNSPECIFIED WHETHER LONG TERM INSULIN USE (HCC): ICD-10-CM

## 2025-06-20 RX ORDER — GLUCOSAM/CHON-MSM1/C/MANG/BOSW 500-416.6
TABLET ORAL
Qty: 100 EACH | Refills: 3 | Status: SHIPPED | OUTPATIENT
Start: 2025-06-20

## 2025-06-28 DIAGNOSIS — E11.69 TYPE 2 DIABETES MELLITUS WITH OTHER SPECIFIED COMPLICATION, UNSPECIFIED WHETHER LONG TERM INSULIN USE (HCC): ICD-10-CM

## 2025-06-30 RX ORDER — SITAGLIPTIN 100 MG/1
100 TABLET, FILM COATED ORAL DAILY
Qty: 30 TABLET | Refills: 3 | Status: SHIPPED | OUTPATIENT
Start: 2025-06-30

## 2025-07-07 DIAGNOSIS — E11.69 TYPE 2 DIABETES MELLITUS WITH OTHER SPECIFIED COMPLICATION, UNSPECIFIED WHETHER LONG TERM INSULIN USE (HCC): ICD-10-CM

## 2025-07-19 DIAGNOSIS — E11.69 TYPE 2 DIABETES MELLITUS WITH OTHER SPECIFIED COMPLICATION, UNSPECIFIED WHETHER LONG TERM INSULIN USE (HCC): ICD-10-CM

## 2025-07-21 DIAGNOSIS — E11.69 TYPE 2 DIABETES MELLITUS WITH OTHER SPECIFIED COMPLICATION, UNSPECIFIED WHETHER LONG TERM INSULIN USE (HCC): ICD-10-CM

## 2025-07-21 RX ORDER — BLOOD SUGAR DIAGNOSTIC
STRIP MISCELLANEOUS
Qty: 100 STRIP | Refills: 3 | Status: SHIPPED | OUTPATIENT
Start: 2025-07-21

## 2025-07-21 RX ORDER — SITAGLIPTIN 100 MG/1
100 TABLET, FILM COATED ORAL DAILY
Qty: 30 TABLET | Refills: 3 | Status: SHIPPED | OUTPATIENT
Start: 2025-07-21

## (undated) DEVICE — DEVICE BX 18 GAX21 CM EZ COR

## (undated) DEVICE — COVER PRB ULTRASOUND 20X3.5 CM BULK ENDOCAVITY NS LTX

## (undated) DEVICE — GLOVE ORANGE PI 8   MSG9080

## (undated) DEVICE — JELLY,LUBE,STERILE,FLIP TOP,TUBE,2-OZ: Brand: MEDLINE

## (undated) DEVICE — MAX-CORE® DISPOSABLE CORE BIOPSY INSTRUMENT, 18G X 20CM: Brand: MAX-CORE

## (undated) DEVICE — PAD N ADH W3XL4IN POLY COT SFT PERF FLM EASILY CUT ABSRB